# Patient Record
Sex: MALE | Race: WHITE | NOT HISPANIC OR LATINO | URBAN - METROPOLITAN AREA
[De-identification: names, ages, dates, MRNs, and addresses within clinical notes are randomized per-mention and may not be internally consistent; named-entity substitution may affect disease eponyms.]

---

## 2024-04-02 ENCOUNTER — INPATIENT (INPATIENT)
Facility: HOSPITAL | Age: 59
LOS: 13 days | Discharge: HOME HEALTH | DRG: 253 | End: 2024-04-16
Attending: STUDENT IN AN ORGANIZED HEALTH CARE EDUCATION/TRAINING PROGRAM | Admitting: STUDENT IN AN ORGANIZED HEALTH CARE EDUCATION/TRAINING PROGRAM
Payer: COMMERCIAL

## 2024-04-02 VITALS
OXYGEN SATURATION: 99 % | HEART RATE: 105 BPM | TEMPERATURE: 98 F | WEIGHT: 182.98 LBS | HEIGHT: 73 IN | DIASTOLIC BLOOD PRESSURE: 64 MMHG | RESPIRATION RATE: 18 BRPM | SYSTOLIC BLOOD PRESSURE: 120 MMHG

## 2024-04-02 DIAGNOSIS — Z02.9 ENCOUNTER FOR ADMINISTRATIVE EXAMINATIONS, UNSPECIFIED: ICD-10-CM

## 2024-04-02 DIAGNOSIS — E11.621 TYPE 2 DIABETES MELLITUS WITH FOOT ULCER: ICD-10-CM

## 2024-04-02 DIAGNOSIS — Z90.49 ACQUIRED ABSENCE OF OTHER SPECIFIED PARTS OF DIGESTIVE TRACT: Chronic | ICD-10-CM

## 2024-04-02 DIAGNOSIS — R94.31 ABNORMAL ELECTROCARDIOGRAM [ECG] [EKG]: ICD-10-CM

## 2024-04-02 DIAGNOSIS — F17.200 NICOTINE DEPENDENCE, UNSPECIFIED, UNCOMPLICATED: ICD-10-CM

## 2024-04-02 DIAGNOSIS — C18.9 MALIGNANT NEOPLASM OF COLON, UNSPECIFIED: ICD-10-CM

## 2024-04-02 DIAGNOSIS — Z29.9 ENCOUNTER FOR PROPHYLACTIC MEASURES, UNSPECIFIED: ICD-10-CM

## 2024-04-02 DIAGNOSIS — L08.9 LOCAL INFECTION OF THE SKIN AND SUBCUTANEOUS TISSUE, UNSPECIFIED: ICD-10-CM

## 2024-04-02 DIAGNOSIS — E11.9 TYPE 2 DIABETES MELLITUS WITHOUT COMPLICATIONS: ICD-10-CM

## 2024-04-02 DIAGNOSIS — Z93.9 ARTIFICIAL OPENING STATUS, UNSPECIFIED: Chronic | ICD-10-CM

## 2024-04-02 LAB
ALBUMIN SERPL ELPH-MCNC: 3.6 G/DL — SIGNIFICANT CHANGE UP (ref 3.3–5)
ALP SERPL-CCNC: 90 U/L — SIGNIFICANT CHANGE UP (ref 40–120)
ALT FLD-CCNC: 9 U/L — LOW (ref 10–45)
ANION GAP SERPL CALC-SCNC: 17 MMOL/L — SIGNIFICANT CHANGE UP (ref 5–17)
AST SERPL-CCNC: 9 U/L — LOW (ref 10–40)
BASE EXCESS BLDV CALC-SCNC: 4.5 MMOL/L — HIGH (ref -2–3)
BASOPHILS # BLD AUTO: 0.05 K/UL — SIGNIFICANT CHANGE UP (ref 0–0.2)
BASOPHILS NFR BLD AUTO: 0.4 % — SIGNIFICANT CHANGE UP (ref 0–2)
BILIRUB SERPL-MCNC: 0.7 MG/DL — SIGNIFICANT CHANGE UP (ref 0.2–1.2)
BUN SERPL-MCNC: 32 MG/DL — HIGH (ref 7–23)
CA-I SERPL-SCNC: 1.23 MMOL/L — SIGNIFICANT CHANGE UP (ref 1.15–1.33)
CALCIUM SERPL-MCNC: 9.7 MG/DL — SIGNIFICANT CHANGE UP (ref 8.4–10.5)
CHLORIDE BLDV-SCNC: 100 MMOL/L — SIGNIFICANT CHANGE UP (ref 96–108)
CHLORIDE SERPL-SCNC: 99 MMOL/L — SIGNIFICANT CHANGE UP (ref 96–108)
CO2 BLDV-SCNC: 32 MMOL/L — HIGH (ref 22–26)
CO2 SERPL-SCNC: 22 MMOL/L — SIGNIFICANT CHANGE UP (ref 22–31)
CREAT SERPL-MCNC: 1.18 MG/DL — SIGNIFICANT CHANGE UP (ref 0.5–1.3)
CRP SERPL-MCNC: 140 MG/L — HIGH (ref 0–4)
EGFR: 72 ML/MIN/1.73M2 — SIGNIFICANT CHANGE UP
EOSINOPHIL # BLD AUTO: 0.05 K/UL — SIGNIFICANT CHANGE UP (ref 0–0.5)
EOSINOPHIL NFR BLD AUTO: 0.4 % — SIGNIFICANT CHANGE UP (ref 0–6)
GAS PNL BLDV: 134 MMOL/L — LOW (ref 136–145)
GAS PNL BLDV: SIGNIFICANT CHANGE UP
GAS PNL BLDV: SIGNIFICANT CHANGE UP
GLUCOSE BLDC GLUCOMTR-MCNC: 161 MG/DL — HIGH (ref 70–99)
GLUCOSE BLDV-MCNC: 208 MG/DL — HIGH (ref 70–99)
GLUCOSE SERPL-MCNC: 199 MG/DL — HIGH (ref 70–99)
HCO3 BLDV-SCNC: 31 MMOL/L — HIGH (ref 22–29)
HCT VFR BLD CALC: 39 % — SIGNIFICANT CHANGE UP (ref 39–50)
HCT VFR BLDA CALC: 39 % — SIGNIFICANT CHANGE UP (ref 39–51)
HGB BLD CALC-MCNC: 12.9 G/DL — SIGNIFICANT CHANGE UP (ref 12.6–17.4)
HGB BLD-MCNC: 13 G/DL — SIGNIFICANT CHANGE UP (ref 13–17)
IMM GRANULOCYTES NFR BLD AUTO: 0.4 % — SIGNIFICANT CHANGE UP (ref 0–0.9)
LACTATE BLDV-MCNC: 1.5 MMOL/L — SIGNIFICANT CHANGE UP (ref 0.5–2)
LYMPHOCYTES # BLD AUTO: 1.47 K/UL — SIGNIFICANT CHANGE UP (ref 1–3.3)
LYMPHOCYTES # BLD AUTO: 10.4 % — LOW (ref 13–44)
MCHC RBC-ENTMCNC: 28.1 PG — SIGNIFICANT CHANGE UP (ref 27–34)
MCHC RBC-ENTMCNC: 33.3 GM/DL — SIGNIFICANT CHANGE UP (ref 32–36)
MCV RBC AUTO: 84.2 FL — SIGNIFICANT CHANGE UP (ref 80–100)
MONOCYTES # BLD AUTO: 0.74 K/UL — SIGNIFICANT CHANGE UP (ref 0–0.9)
MONOCYTES NFR BLD AUTO: 5.2 % — SIGNIFICANT CHANGE UP (ref 2–14)
NEUTROPHILS # BLD AUTO: 11.75 K/UL — HIGH (ref 1.8–7.4)
NEUTROPHILS NFR BLD AUTO: 83.2 % — HIGH (ref 43–77)
NRBC # BLD: 0 /100 WBCS — SIGNIFICANT CHANGE UP (ref 0–0)
PCO2 BLDV: 52 MMHG — SIGNIFICANT CHANGE UP (ref 42–55)
PH BLDV: 7.38 — SIGNIFICANT CHANGE UP (ref 7.32–7.43)
PLATELET # BLD AUTO: 394 K/UL — SIGNIFICANT CHANGE UP (ref 150–400)
PO2 BLDV: 17 MMHG — LOW (ref 25–45)
POTASSIUM BLDV-SCNC: 4.3 MMOL/L — SIGNIFICANT CHANGE UP (ref 3.5–5.1)
POTASSIUM SERPL-MCNC: 4.5 MMOL/L — SIGNIFICANT CHANGE UP (ref 3.5–5.3)
POTASSIUM SERPL-SCNC: 4.5 MMOL/L — SIGNIFICANT CHANGE UP (ref 3.5–5.3)
PROT SERPL-MCNC: 7.5 G/DL — SIGNIFICANT CHANGE UP (ref 6–8.3)
RBC # BLD: 4.63 M/UL — SIGNIFICANT CHANGE UP (ref 4.2–5.8)
RBC # FLD: 12.5 % — SIGNIFICANT CHANGE UP (ref 10.3–14.5)
SAO2 % BLDV: 23.1 % — LOW (ref 67–88)
SODIUM SERPL-SCNC: 138 MMOL/L — SIGNIFICANT CHANGE UP (ref 135–145)
WBC # BLD: 14.12 K/UL — HIGH (ref 3.8–10.5)
WBC # FLD AUTO: 14.12 K/UL — HIGH (ref 3.8–10.5)

## 2024-04-02 PROCEDURE — 71045 X-RAY EXAM CHEST 1 VIEW: CPT | Mod: 26

## 2024-04-02 PROCEDURE — 73720 MRI LWR EXTREMITY W/O&W/DYE: CPT | Mod: 26,LT

## 2024-04-02 PROCEDURE — 99285 EMERGENCY DEPT VISIT HI MDM: CPT

## 2024-04-02 PROCEDURE — 73630 X-RAY EXAM OF FOOT: CPT | Mod: 26,LT

## 2024-04-02 PROCEDURE — 99223 1ST HOSP IP/OBS HIGH 75: CPT

## 2024-04-02 RX ORDER — GLUCAGON INJECTION, SOLUTION 0.5 MG/.1ML
1 INJECTION, SOLUTION SUBCUTANEOUS ONCE
Refills: 0 | Status: DISCONTINUED | OUTPATIENT
Start: 2024-04-02 | End: 2024-04-10

## 2024-04-02 RX ORDER — ACETAMINOPHEN 500 MG
975 TABLET ORAL ONCE
Refills: 0 | Status: COMPLETED | OUTPATIENT
Start: 2024-04-02 | End: 2024-04-02

## 2024-04-02 RX ORDER — ALBUTEROL 90 UG/1
2 AEROSOL, METERED ORAL EVERY 6 HOURS
Refills: 0 | Status: DISCONTINUED | OUTPATIENT
Start: 2024-04-02 | End: 2024-04-10

## 2024-04-02 RX ORDER — DEXTROSE 50 % IN WATER 50 %
25 SYRINGE (ML) INTRAVENOUS ONCE
Refills: 0 | Status: DISCONTINUED | OUTPATIENT
Start: 2024-04-02 | End: 2024-04-10

## 2024-04-02 RX ORDER — VANCOMYCIN HCL 1 G
1000 VIAL (EA) INTRAVENOUS ONCE
Refills: 0 | Status: COMPLETED | OUTPATIENT
Start: 2024-04-02 | End: 2024-04-02

## 2024-04-02 RX ORDER — ACETAMINOPHEN 500 MG
650 TABLET ORAL EVERY 6 HOURS
Refills: 0 | Status: DISCONTINUED | OUTPATIENT
Start: 2024-04-02 | End: 2024-04-10

## 2024-04-02 RX ORDER — CEFEPIME 1 G/1
2000 INJECTION, POWDER, FOR SOLUTION INTRAMUSCULAR; INTRAVENOUS ONCE
Refills: 0 | Status: COMPLETED | OUTPATIENT
Start: 2024-04-02 | End: 2024-04-02

## 2024-04-02 RX ORDER — CEFEPIME 1 G/1
2000 INJECTION, POWDER, FOR SOLUTION INTRAMUSCULAR; INTRAVENOUS EVERY 8 HOURS
Refills: 0 | Status: DISCONTINUED | OUTPATIENT
Start: 2024-04-02 | End: 2024-04-05

## 2024-04-02 RX ORDER — VANCOMYCIN HCL 1 G
1000 VIAL (EA) INTRAVENOUS EVERY 12 HOURS
Refills: 0 | Status: DISCONTINUED | OUTPATIENT
Start: 2024-04-03 | End: 2024-04-04

## 2024-04-02 RX ORDER — INSULIN LISPRO 100/ML
VIAL (ML) SUBCUTANEOUS AT BEDTIME
Refills: 0 | Status: DISCONTINUED | OUTPATIENT
Start: 2024-04-02 | End: 2024-04-10

## 2024-04-02 RX ORDER — SODIUM CHLORIDE 9 MG/ML
1000 INJECTION, SOLUTION INTRAVENOUS
Refills: 0 | Status: DISCONTINUED | OUTPATIENT
Start: 2024-04-02 | End: 2024-04-10

## 2024-04-02 RX ORDER — DEXTROSE 50 % IN WATER 50 %
15 SYRINGE (ML) INTRAVENOUS ONCE
Refills: 0 | Status: DISCONTINUED | OUTPATIENT
Start: 2024-04-02 | End: 2024-04-10

## 2024-04-02 RX ORDER — ENOXAPARIN SODIUM 100 MG/ML
40 INJECTION SUBCUTANEOUS EVERY 24 HOURS
Refills: 0 | Status: DISCONTINUED | OUTPATIENT
Start: 2024-04-02 | End: 2024-04-10

## 2024-04-02 RX ORDER — DEXTROSE 50 % IN WATER 50 %
12.5 SYRINGE (ML) INTRAVENOUS ONCE
Refills: 0 | Status: DISCONTINUED | OUTPATIENT
Start: 2024-04-02 | End: 2024-04-10

## 2024-04-02 RX ORDER — INSULIN LISPRO 100/ML
VIAL (ML) SUBCUTANEOUS
Refills: 0 | Status: DISCONTINUED | OUTPATIENT
Start: 2024-04-02 | End: 2024-04-10

## 2024-04-02 RX ADMIN — Medication 250 MILLIGRAM(S): at 18:18

## 2024-04-02 RX ADMIN — CEFEPIME 100 MILLIGRAM(S): 1 INJECTION, POWDER, FOR SOLUTION INTRAMUSCULAR; INTRAVENOUS at 17:24

## 2024-04-02 RX ADMIN — Medication 975 MILLIGRAM(S): at 17:34

## 2024-04-02 NOTE — CONSULT NOTE ADULT - SUBJECTIVE AND OBJECTIVE BOX
Patient is a 59y old  Male who presents with a chief complaint of left foot submet 5 wound     HPI: 59-year-old male history of stage IV colon cancer peripheral neuropathy sent in from his podiatrist office with concern for osteomyelitis of the left foot.  Patient was unaware of any infection but noticed some drainage between his second and third toes this morning prompting him to go to the podiatrist.  There he was found to have what is described as a "bad" foot infection with concern for osteo and was sent in for admission and IV antibiotics.  Patient was rapidly assessed via a telemedicine and/or role of Quick Triage Doctor; a limited history, physical exam and assessment was performed. The patient will be seen and further evaluated in the main emergency department. The remainder of care and evaluation will be conducted by the primary emergency medicine team. Receiving team will follow up on labs, imaging and serially reassess patient as indicated. All further decisions regarding patient care, evaluation and disposition are at the discretion of the receiving primary emergency department team.      PAST MEDICAL & SURGICAL HISTORY:      MEDICATIONS  (STANDING):  acetaminophen     Tablet .. 975 milliGRAM(s) Oral once  vancomycin  IVPB. 1000 milliGRAM(s) IV Intermittent once    MEDICATIONS  (PRN):      Allergies    No Known Allergies    Intolerances        VITALS:    Vital Signs Last 24 Hrs  T(C): 36.9 (02 Apr 2024 14:09), Max: 36.9 (02 Apr 2024 14:09)  T(F): 98.4 (02 Apr 2024 14:09), Max: 98.4 (02 Apr 2024 14:09)  HR: 105 (02 Apr 2024 14:09) (105 - 105)  BP: 120/64 (02 Apr 2024 14:09) (120/64 - 120/64)  BP(mean): --  RR: 18 (02 Apr 2024 14:09) (18 - 18)  SpO2: 99% (02 Apr 2024 14:09) (99% - 99%)    Parameters below as of 02 Apr 2024 14:09  Patient On (Oxygen Delivery Method): room air        LABS:                CAPILLARY BLOOD GLUCOSE              LOWER EXTREMITY PHYSICAL EXAM:    Vascular: DP/PT 2/4, B/L, CFT <3  seconds B/L, Temperature gradient warm to cool B/L.   Neuro: Epicritic sensation diminished to the level of digits, B/L.  Musculoskeletal/Ortho: unremarkable   Skin: Left foot submet 5 wound to bone, fibronecrotic wound bed, tracking 1cm circumferentially malodorous, serous drainage. erythema to midfoot. Right foot with no open wounds or signs of infection.     RADIOLOGY & ADDITIONAL STUDIES:

## 2024-04-02 NOTE — H&P ADULT - NSHPSOCIALHISTORY_GEN_ALL_CORE
NO ethanol or recreational substance use.    Still smokes pack/day cigarettes since age 11 refuses to quit.   .   [employer].

## 2024-04-02 NOTE — H&P ADULT - NSHPSOURCEINFOTX_GEN_ALL_CORE
Patient reports no regular medications.    Family aware of admission and the patient did not wish for me to contact family at this hour.

## 2024-04-02 NOTE — ED PROVIDER NOTE - RAPID ASSESSMENT
59-year-old male history of stage IV colon cancer peripheral neuropathy sent in from his podiatrist office with concern for osteomyelitis of the left foot.  Patient was unaware of any infection but noticed some drainage between his second and third toes this morning prompting him to go to the podiatrist.  There he was found to have what is described as a "bad" foot infection with concern for osteo and was sent in for admission and IV antibiotics.  Patient was rapidly assessed via a telemedicine and/or role of Quick Triage Doctor; a limited history, physical exam and assessment was performed. The patient will be seen and further evaluated in the main emergency department. The remainder of care and evaluation will be conducted by the primary emergency medicine team. Receiving team will follow up on labs, imaging and serially reassess patient as indicated. All further decisions regarding patient care, evaluation and disposition are at the discretion of the receiving primary emergency department team.

## 2024-04-02 NOTE — H&P ADULT - PROBLEM SELECTOR PLAN 2
Patient S/P colectomy with ostomy.   Ostomy care per RN protocol.    Would consider formal Oncology evaluation in the AM.

## 2024-04-02 NOTE — H&P ADULT - EXTREMITIES COMMENTS
LEFT foot dressing c/d/i--changed by Podiatry and patient declined to have replaced by me.   RIGHT foot with shallow, healed plantar ulcer 5th MTP.

## 2024-04-02 NOTE — CONSULT NOTE ADULT - ASSESSMENT
59 y.o M w/ left foot submet 5 wound to bone   - Pt was seen and evaluated.   - Afebrile, labs pending   - Left foot submet 5 wound to bone, fibronecrotic wound bed, tracking 1cm circumferentially malodorous, serous drainage. erythema to midfoot. Right foot with no open wounds or signs of infection.   - Left foot xray: no gas, no OM  - Attention was drawn to the left foot, Using Sterile suture removal kit and #15 blade, the wound was explored and a 1cm incision was proximal to the wound down to the level of subQ and not beyond, mild serous draiange expressed  - The wound was flushed and packed, and dressed with dry sterile dressing   - Pt tolerated the procedure well  - Recommend admission through medicine.  - Recommend IV antibiotics IV vanco/cefepime  - Ordered ABIs/PVR - consult vascular if abnormal findings   - Left  foot wound cultured  - Ordered MRI of the left foot   - Pod Plan: Local wound care versus possible left foot 5th met head resection pending MR  - Please document medical clearance for podiatric surgery   - Discussed with attending.

## 2024-04-02 NOTE — ED ADULT NURSE NOTE - NSFALLRISKINTERV_ED_ALL_ED

## 2024-04-02 NOTE — H&P ADULT - PROBLEM SELECTOR PLAN 1
Patient agrees with IV antibiotics as above.   F/U MRI LEFT foot.   Would consider formal ID evaluation in the AM.

## 2024-04-02 NOTE — H&P ADULT - ASSESSMENT
NIGHT HOSPITALIST:     Referral to the ER following LEFT foot ulcer in the setting of apparent type 2 DM not currently on Rx with undifferentiated neuropathy (presumably from DM2 but unclear if from reported prior chemo, assuming myeloma previously excluded), with active tobacco use (refuses to quit or consider nicotine patches) with mild wheezing.   Patient agrees with IV antibiotics as above.   F/U MRI LEFT foot.    Patient S/P colectomy with ostomy.   Ostomy care per RN protocol.    Would consider formal Oncology evaluation in the AM.    Would consider formal ID evaluation in the AM.    Will follow FS S/S, UA, urine protein/Cr.    Would consider formal Endocrinology evaluation  in the AM.    Patient with septal Q waves on EKG.   Will obtain echo to assess LV function.    Patient agrees to pharmacologic DVT prophylaxis. NIGHT HOSPITALIST:     Referral to the ER following LEFT foot ulcer in the setting of apparent type 2 DM not currently on Rx with undifferentiated neuropathy (presumably from DM2 but unclear if from reported prior chemo, assuming myeloma previously excluded), with active tobacco use (refuses to quit or consider nicotine patches) with mild wheezing.   Patient agrees with IV antibiotics as above.   F/U MRI LEFT foot.   Would consider formal ID evaluation in the AM.    Patient S/P colectomy with ostomy.   Ostomy care per RN protocol.    Would consider formal Oncology evaluation in the AM.    Will follow FS S/S, UA, urine protein/Cr.    Would consider formal Endocrinology evaluation  in the AM.    Patient with septal Q waves on EKG.   Will obtain echo to assess LV function.    Patient agrees to pharmacologic DVT prophylaxis.

## 2024-04-02 NOTE — H&P ADULT - PROBLEM SELECTOR PROBLEM 2
General: elderly woman reclining in bed with flat affect, staring straight ahead, interacts with interviewer but takes significant amount of time to answer questions  -no bruising or broken bones noted  HEENT: skin tightening in circular pattern around mouth, speaks with minimal movement of mouth, does not open eyes completely wide  Cardiac: S1S2, RRR, no carotid bruits appreciated  Lungs: CTAB  Abd: NTND, old surgical scar on abd, +soft, +BS  LE: no edema  Neuro: AAOx2, no focal deficits, CN 2-12 grossly intact
Colon cancer

## 2024-04-02 NOTE — ED ADULT NURSE NOTE - OBJECTIVE STATEMENT
patient is a 60 y/o M with hx of stage IV colon CA s/p chemo c/b peripheral neuropathy who presents to the ED via triage for wound check. patient states that after his chemo round he developed severe neuropathy and hasn't been able to feel his feet. patient states that this morning he looked down at his left foot and noticed the wound on the bottom of the foot. patient sent in by podiatrist to r/o osteomyelitis. patient endorsing difficulty with ambulation. patient resting in NAD. respirations even and unlabored. abdomen soft, nondistended. denies fevers/chills, weakness, headache, dizziness, vision changes, cp, sob, cough, abd pain, n/v/d, dysuria, hematuria, bloody stools, back pain. 20 G IV placed in LAC. podiatry at bedside patient is a 58 y/o M with hx of stage IV colon CA s/p chemo c/b peripheral neuropathy who presents to the ED via triage for wound check. patient states that after his chemo round he developed severe neuropathy and hasn't been able to feel his feet. patient states that this morning he looked down at his left foot and noticed the wound on the bottom of the foot. patient sent in by podiatrist to r/o osteomyelitis. patient endorsing difficulty with ambulation. patient resting in NAD. respirations even and unlabored. abdomen soft, nondistended. denies fevers/chills, weakness, headache, dizziness, vision changes, cp, sob, cough, abd pain, n/v/d, dysuria, hematuria, bloody stools, back pain. 20 G IV placed in LAC. podiatry at bedside. patient with unaccessed right anterior chest wall port and ostomy bag

## 2024-04-02 NOTE — H&P ADULT - HISTORY OF PRESENT ILLNESS
NIGHT HOSPITALIST:     Patient UNKNOWN to me previously, assigned to me at this point via the ER and by Dr. 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   Patient admits that he does not normally inspect his feet (patient with a healed RIGHT plantar 5th MTP ulcer) until today.  Seen by Podiatry in the ER with LEFT foot submet 5 wound with dressing changed by Podiatry.  NO fever, no chills, no rigors.  NO N/V.    NO chest pain/pressure.  NO palpitations.  NO cough, no dyspnoea.  NO abdominal pain, no ostomy pain.  NO red blood per rectum or ostomy.  No back pain, no tearing back pain.  NO dysuria.

## 2024-04-02 NOTE — H&P ADULT - PROBLEM SELECTOR PLAN 3
Will follow FS S/S, UA, urine protein/Cr.    Would consider formal Endocrinology evaluation  in the AM.

## 2024-04-02 NOTE — ED PROVIDER NOTE - PHYSICAL EXAMINATION
CONSTITUTIONAL: Well appearing and in no apparent distress.  ENT: Airway patent, moist mucous membranes.   EYES: Pupils equal, round and reactive to light. EOMI. Conjunctiva normal appearing.   CARDIAC: Normal rate, regular rhythm.  Heart sounds S1, S2.    RESPIRATORY: Breath sounds clear and equal bilaterally.   GASTROINTESTINAL: Abdomen soft, non-tender, not distended.  MUSCULOSKELETAL: Spine appears normal.  NEUROLOGICAL: Alert and oriented x3, grossly normal. CONSTITUTIONAL: Well appearing and in no apparent distress.  ENT: Airway patent, moist mucous membranes.   EYES: Pupils equal, round and reactive to light. EOMI. Conjunctiva normal appearing.   CARDIAC: Normal rate, regular rhythm.  Heart sounds S1, S2.    RESPIRATORY: Breath sounds clear and equal bilaterally.   GASTROINTESTINAL: Abdomen soft, non-tender, not distended.  MUSCULOSKELETAL: Spine appears normal. Left foot wound to plantar aspect of foot on lateral aspect. +Malodorous w/ serous drainage.   NEUROLOGICAL: Alert and oriented x3, grossly normal.

## 2024-04-02 NOTE — H&P ADULT - PROBLEM SELECTOR PLAN 6
Transitions of Care Status:  1.  Name of PCP:    Dewayne Purvis MD (Oncology) [phone]  2.  PCP Contacted on Admission: [ ] Y    [x ] N    3.  PCP contacted at Discharge: [ ] Y    [ ] N    [ ] N/A  4.  Post-Discharge Appointment Date and Location:  5.  Summary of Handoff given to PCP:

## 2024-04-02 NOTE — H&P ADULT - PROBLEM SELECTOR PLAN 7
Active tobacco use (refuses to quit or consider nicotine patches) with mild wheezing.   Proventil MDI PRN.

## 2024-04-02 NOTE — H&P ADULT - NSHPLABSRESULTS_GEN_ALL_CORE
WBC 14.1    Hgb 13.0    Platelets of 294K    Random glucose of 199  HCO3 22    K+ 4.5    Cr 1.1    Alb 3.6    Chest radiograph interpreted by me with RIGHT sided Mediport, no infiltrate or effusion.    EKG tracing interpreted by me with NSR at 98 with incomplete RBBB with Q V1V2.    LEFT foot radiograph with no gas, no FB, or gross radiographic evidence of osteo, no fx or dislocations.  No lytic or blastic lesions.

## 2024-04-02 NOTE — ED PROVIDER NOTE - CLINICAL SUMMARY MEDICAL DECISION MAKING FREE TEXT BOX
60 yo M with a PMH of stage IV colon cancer, peripheral neuropathy presents as referral from Pods with concern for osteomyelitis of the left foot. Patient noticed some drainage between his second and third toes this morning prompting him to go to the podiatrist and was then referred to ED. 58 yo M with a PMH of stage IV colon cancer, peripheral neuropathy presents as referral from Pods with concern for osteomyelitis of the left foot. Initially went to see his Oncologist, Dr. Rock today for follow up, showed him his right foot and noticed wound and drainage. Reports he noticed some drainage between his second and third toes this morning. Oncologist prompted him to go to the Podiatrist who then referred to ED. Pt has been able to ambulate and denies any foot pain. Denies nausea, vomiting, fever, chills, extremity weakness/paresthesias. Plan for labs- cbc/cmp/blood cs, xray left foot, pods cs and admit for IV abx. Angela Latif PA-C 60 yo M with a PMH of stage IV colon cancer, peripheral neuropathy presents as referral from Pods with concern for osteomyelitis of the left foot. Initially went to see his Oncologist, Dr. Rock today for follow up, showed him his right foot and noticed wound and drainage. Reports he noticed some drainage between his second and third toes this morning. Oncologist prompted him to go to the Podiatrist who then referred to ED. Pt has been able to ambulate and denies any foot pain. Denies nausea, vomiting, fever, chills, extremity weakness/paresthesias. Plan for labs- cbc/cmp/blood cs, xray left foot, pods cs and admit for IV abx. GIANNI Lerner: 59 yea rold male with stage IV colon cancer referred to ER for eval for osteo of left foot.  PE: alert, nad, nonlabored presisrionts, + s1s2: msk: left foot wound to plantar aspect of foot on lateral aspect, + malodrous with serous drainage, warm, + + 2 dp b/l le. alert and oriented x 3, no focal deficits. will get imging, labs, podiatry consult, start kerri bx. admit for iv abx and eval for osteo.

## 2024-04-02 NOTE — ED ADULT TRIAGE NOTE - CHIEF COMPLAINT QUOTE
L side foot wound, sent by podiatrist to be admitted for IV antibiotics for necrotic L foot infection.

## 2024-04-02 NOTE — H&P ADULT - NSHPADDITIONALINFOADULT_GEN_ALL_CORE
NIGHT HOSPITALIST:     Patient aware of course and agrees with plan/care as above.   Given patient's comorbidities, patient's long term prognosis is guarded.   Emotional support provided to patient.    The patient is not yet ready to discuss advance directives.  Discussed with Dr. Berry of Podiatry.   Care reviewed with covering NP/PA for endorsement to the [facility].    Paxton Obando MD  Available On [employer] Teams.

## 2024-04-02 NOTE — H&P ADULT - RESPIRATORY COMMENTS
Good air exchange, occasional wheeze. Good air exchange, occasional wheeze.   RIGHT [facility] c/d/i.

## 2024-04-02 NOTE — H&P ADULT - NSHPREVIEWOFSYSTEMS_GEN_ALL_CORE
NO HA< no focal weakness.  No chest pain/pressure.  NO palpitations.  NO cough, no dyspnoea.  No fever, no chills, no rigors.  NO back pain, no tearing back pain.    NO ostomy pain, no red blood per rectum or melena per ostomy.  NO rash.  NO SI/HI>  NO thyroid symptoms.  NO dysuria, no hematuria.    Patient has refused the COVID-19 vaccine to date.

## 2024-04-02 NOTE — H&P ADULT - PROBLEM SELECTOR PLAN 4
Patient with septal Q waves on EKG.   Will obtain echo to assess LV function. Patient with septal Q waves on EKG.   No present symptoms of ACS.  Will obtain echo to assess LV function.

## 2024-04-03 LAB
A1C WITH ESTIMATED AVERAGE GLUCOSE RESULT: 6.8 % — HIGH (ref 4–5.6)
ANION GAP SERPL CALC-SCNC: 15 MMOL/L — SIGNIFICANT CHANGE UP (ref 5–17)
APPEARANCE UR: CLEAR — SIGNIFICANT CHANGE UP
BACTERIA # UR AUTO: NEGATIVE /HPF — SIGNIFICANT CHANGE UP
BASOPHILS # BLD AUTO: 0.05 K/UL — SIGNIFICANT CHANGE UP (ref 0–0.2)
BASOPHILS NFR BLD AUTO: 0.4 % — SIGNIFICANT CHANGE UP (ref 0–2)
BILIRUB UR-MCNC: NEGATIVE — SIGNIFICANT CHANGE UP
BUN SERPL-MCNC: 29 MG/DL — HIGH (ref 7–23)
CALCIUM SERPL-MCNC: 9 MG/DL — SIGNIFICANT CHANGE UP (ref 8.4–10.5)
CAST: 8 /LPF — HIGH (ref 0–4)
CHLORIDE SERPL-SCNC: 99 MMOL/L — SIGNIFICANT CHANGE UP (ref 96–108)
CO2 SERPL-SCNC: 21 MMOL/L — LOW (ref 22–31)
COLOR SPEC: SIGNIFICANT CHANGE UP
CREAT ?TM UR-MCNC: 200 MG/DL — SIGNIFICANT CHANGE UP
CREAT SERPL-MCNC: 1.12 MG/DL — SIGNIFICANT CHANGE UP (ref 0.5–1.3)
DIFF PNL FLD: ABNORMAL
EGFR: 76 ML/MIN/1.73M2 — SIGNIFICANT CHANGE UP
EOSINOPHIL # BLD AUTO: 0.15 K/UL — SIGNIFICANT CHANGE UP (ref 0–0.5)
EOSINOPHIL NFR BLD AUTO: 1.2 % — SIGNIFICANT CHANGE UP (ref 0–6)
ERYTHROCYTE [SEDIMENTATION RATE] IN BLOOD: 99 MM/HR — HIGH (ref 0–20)
ESTIMATED AVERAGE GLUCOSE: 148 MG/DL — HIGH (ref 68–114)
GLUCOSE BLDC GLUCOMTR-MCNC: 120 MG/DL — HIGH (ref 70–99)
GLUCOSE BLDC GLUCOMTR-MCNC: 190 MG/DL — HIGH (ref 70–99)
GLUCOSE BLDC GLUCOMTR-MCNC: 199 MG/DL — HIGH (ref 70–99)
GLUCOSE BLDC GLUCOMTR-MCNC: 243 MG/DL — HIGH (ref 70–99)
GLUCOSE SERPL-MCNC: 242 MG/DL — HIGH (ref 70–99)
GLUCOSE UR QL: NEGATIVE MG/DL — SIGNIFICANT CHANGE UP
GRAM STN FLD: ABNORMAL
HCT VFR BLD CALC: 34.7 % — LOW (ref 39–50)
HGB BLD-MCNC: 11.6 G/DL — LOW (ref 13–17)
IMM GRANULOCYTES NFR BLD AUTO: 0.3 % — SIGNIFICANT CHANGE UP (ref 0–0.9)
KETONES UR-MCNC: NEGATIVE MG/DL — SIGNIFICANT CHANGE UP
LEUKOCYTE ESTERASE UR-ACNC: NEGATIVE — SIGNIFICANT CHANGE UP
LYMPHOCYTES # BLD AUTO: 1.6 K/UL — SIGNIFICANT CHANGE UP (ref 1–3.3)
LYMPHOCYTES # BLD AUTO: 13.2 % — SIGNIFICANT CHANGE UP (ref 13–44)
MCHC RBC-ENTMCNC: 28 PG — SIGNIFICANT CHANGE UP (ref 27–34)
MCHC RBC-ENTMCNC: 33.4 GM/DL — SIGNIFICANT CHANGE UP (ref 32–36)
MCV RBC AUTO: 83.6 FL — SIGNIFICANT CHANGE UP (ref 80–100)
MONOCYTES # BLD AUTO: 0.88 K/UL — SIGNIFICANT CHANGE UP (ref 0–0.9)
MONOCYTES NFR BLD AUTO: 7.2 % — SIGNIFICANT CHANGE UP (ref 2–14)
MRSA PCR RESULT.: SIGNIFICANT CHANGE UP
NEUTROPHILS # BLD AUTO: 9.42 K/UL — HIGH (ref 1.8–7.4)
NEUTROPHILS NFR BLD AUTO: 77.7 % — HIGH (ref 43–77)
NITRITE UR-MCNC: NEGATIVE — SIGNIFICANT CHANGE UP
NRBC # BLD: 0 /100 WBCS — SIGNIFICANT CHANGE UP (ref 0–0)
PH UR: 5.5 — SIGNIFICANT CHANGE UP (ref 5–8)
PLATELET # BLD AUTO: 339 K/UL — SIGNIFICANT CHANGE UP (ref 150–400)
POTASSIUM SERPL-MCNC: 4.1 MMOL/L — SIGNIFICANT CHANGE UP (ref 3.5–5.3)
POTASSIUM SERPL-SCNC: 4.1 MMOL/L — SIGNIFICANT CHANGE UP (ref 3.5–5.3)
PROT ?TM UR-MCNC: 299 MG/DL — HIGH (ref 0–12)
PROT UR-MCNC: 300 MG/DL
PROT/CREAT UR-RTO: 1.5 RATIO — HIGH (ref 0–0.2)
RBC # BLD: 4.15 M/UL — LOW (ref 4.2–5.8)
RBC # FLD: 12.3 % — SIGNIFICANT CHANGE UP (ref 10.3–14.5)
RBC CASTS # UR COMP ASSIST: 3 /HPF — SIGNIFICANT CHANGE UP (ref 0–4)
REVIEW: SIGNIFICANT CHANGE UP
S AUREUS DNA NOSE QL NAA+PROBE: SIGNIFICANT CHANGE UP
SODIUM SERPL-SCNC: 135 MMOL/L — SIGNIFICANT CHANGE UP (ref 135–145)
SP GR SPEC: 1.03 — SIGNIFICANT CHANGE UP (ref 1–1.03)
SPECIMEN SOURCE: SIGNIFICANT CHANGE UP
SQUAMOUS # UR AUTO: 2 /HPF — SIGNIFICANT CHANGE UP (ref 0–5)
UROBILINOGEN FLD QL: 0.2 MG/DL — SIGNIFICANT CHANGE UP (ref 0.2–1)
WBC # BLD: 12.14 K/UL — HIGH (ref 3.8–10.5)
WBC # FLD AUTO: 12.14 K/UL — HIGH (ref 3.8–10.5)
WBC UR QL: 1 /HPF — SIGNIFICANT CHANGE UP (ref 0–5)

## 2024-04-03 PROCEDURE — 71045 X-RAY EXAM CHEST 1 VIEW: CPT | Mod: 26

## 2024-04-03 PROCEDURE — 93010 ELECTROCARDIOGRAM REPORT: CPT | Mod: 76

## 2024-04-03 PROCEDURE — 93356 MYOCRD STRAIN IMG SPCKL TRCK: CPT

## 2024-04-03 PROCEDURE — 93306 TTE W/DOPPLER COMPLETE: CPT | Mod: 26

## 2024-04-03 PROCEDURE — 93923 UPR/LXTR ART STDY 3+ LVLS: CPT | Mod: 26

## 2024-04-03 RX ORDER — INFLUENZA VIRUS VACCINE 15; 15; 15; 15 UG/.5ML; UG/.5ML; UG/.5ML; UG/.5ML
0.5 SUSPENSION INTRAMUSCULAR ONCE
Refills: 0 | Status: DISCONTINUED | OUTPATIENT
Start: 2024-04-03 | End: 2024-04-16

## 2024-04-03 RX ORDER — CHLORHEXIDINE GLUCONATE 213 G/1000ML
1 SOLUTION TOPICAL
Refills: 0 | Status: DISCONTINUED | OUTPATIENT
Start: 2024-04-03 | End: 2024-04-10

## 2024-04-03 RX ADMIN — CEFEPIME 100 MILLIGRAM(S): 1 INJECTION, POWDER, FOR SOLUTION INTRAMUSCULAR; INTRAVENOUS at 05:11

## 2024-04-03 RX ADMIN — CHLORHEXIDINE GLUCONATE 1 APPLICATION(S): 213 SOLUTION TOPICAL at 13:52

## 2024-04-03 RX ADMIN — CEFEPIME 100 MILLIGRAM(S): 1 INJECTION, POWDER, FOR SOLUTION INTRAMUSCULAR; INTRAVENOUS at 13:52

## 2024-04-03 RX ADMIN — ENOXAPARIN SODIUM 40 MILLIGRAM(S): 100 INJECTION SUBCUTANEOUS at 22:08

## 2024-04-03 RX ADMIN — Medication 1: at 18:09

## 2024-04-03 RX ADMIN — CEFEPIME 100 MILLIGRAM(S): 1 INJECTION, POWDER, FOR SOLUTION INTRAMUSCULAR; INTRAVENOUS at 22:09

## 2024-04-03 RX ADMIN — Medication 2: at 08:16

## 2024-04-03 RX ADMIN — Medication 250 MILLIGRAM(S): at 18:09

## 2024-04-03 RX ADMIN — Medication 1: at 13:52

## 2024-04-03 RX ADMIN — Medication 250 MILLIGRAM(S): at 07:12

## 2024-04-03 NOTE — PROGRESS NOTE ADULT - SUBJECTIVE AND OBJECTIVE BOX
SUBJECTIVE / OVERNIGHT EVENTS:    Patient seen and examined at bedside. Upset about his foot      --------------------------------------------------------------------------------------------  LABS:                        11.6   12.14 )-----------( 339      ( 2024 07:17 )             34.7     04    135  |  99  |  29<H>  ----------------------------<  242<H>  4.1   |  21<L>  |  1.12    Ca    9.0      2024 07:17    TPro  7.5  /  Alb  3.6  /  TBili  0.7  /  DBili  x   /  AST  9<L>  /  ALT  9<L>  /  AlkPhos  90  04-02      CAPILLARY BLOOD GLUCOSE      POCT Blood Glucose.: 243 mg/dL (2024 07:28)  POCT Blood Glucose.: 161 mg/dL (2024 23:24)        Urinalysis Basic - ( 2024 07:27 )    Color: Dark Yellow / Appearance: Clear / S.027 / pH: x  Gluc: x / Ketone: Negative mg/dL  / Bili: Negative / Urobili: 0.2 mg/dL   Blood: x / Protein: 300 mg/dL / Nitrite: Negative   Leuk Esterase: Negative / RBC: 3 /HPF / WBC 1 /HPF   Sq Epi: x / Non Sq Epi: 2 /HPF / Bacteria: Negative /HPF        RADIOLOGY & ADDITIONAL TESTS: < from: MR Foot w/wo IV Cont, Left (24 @ 23:00) >    IMPRESSION:  Findings suggestive of early osteomyelitis at the fifth metatarsal head   and base of the proximal phalanx, deep to the soft tissue wound. Focal   osteitis is also a differential consideration for the osseous findings..    --- End of Report ---    < end of copied text >  < from: Xray Chest 1 View AP/PA (24 @ 14:52) >      IMPRESSION:    No radiographic evidence of acute cardiopulmonary disease. Right-sided   chest wall port with tip in proper position.    --- End of Report ---      < end of copied text >  < from: Xray Foot AP + Lateral + Oblique, Left (24 @ 14:51) >  IMPRESSION:  No tracking soft tissue gas collections, radiopaque foreign bodies, or   gross radiographic evidence for osteomyelitis.    No fractures or dislocations.    Tarsometatarsal alignment maintained without evidence for a Lisfranc   injury.    Congenitally fused 5th DIP joint. Preserved remaining visualized joint   spaces and no joint margin erosions.    No lytic or blastic lesions.    --- End of Report ---    < end of copied text >      Imaging Personally Reviewed:  [x] YES  [ ] NO    Consultant(s) Notes Reviewed:  [x] YES  [ ] NO    MEDICATIONS  (STANDING):  cefepime   IVPB 2000 milliGRAM(s) IV Intermittent every 8 hours  dextrose 5%. 1000 milliLiter(s) (50 mL/Hr) IV Continuous <Continuous>  dextrose 5%. 1000 milliLiter(s) (100 mL/Hr) IV Continuous <Continuous>  dextrose 50% Injectable 25 Gram(s) IV Push once  dextrose 50% Injectable 12.5 Gram(s) IV Push once  dextrose 50% Injectable 25 Gram(s) IV Push once  enoxaparin Injectable 40 milliGRAM(s) SubCutaneous every 24 hours  glucagon  Injectable 1 milliGRAM(s) IntraMuscular once  insulin lispro (ADMELOG) corrective regimen sliding scale   SubCutaneous three times a day before meals  insulin lispro (ADMELOG) corrective regimen sliding scale   SubCutaneous at bedtime  vancomycin  IVPB 1000 milliGRAM(s) IV Intermittent every 12 hours    MEDICATIONS  (PRN):  acetaminophen     Tablet .. 650 milliGRAM(s) Oral every 6 hours PRN Temp greater or equal to 38C (100.4F), Mild Pain (1 - 3)  albuterol    90 MICROgram(s) HFA Inhaler 2 Puff(s) Inhalation every 6 hours PRN Bronchospasm  dextrose Oral Gel 15 Gram(s) Oral once PRN Blood Glucose LESS THAN 70 milliGRAM(s)/deciliter      Care Discussed with Consultants/Other Providers [x] YES  [ ] NO    Vital Signs Last 24 Hrs  T(C): 37.2 (2024 09:07), Max: 37.6 (2024 18:23)  T(F): 99 (2024 09:07), Max: 99.6 (2024 18:23)  HR: 92 (2024 09:07) (77 - 105)  BP: 147/75 (2024 09:07) (113/72 - 147/75)  BP(mean): --  RR: 18 (2024 09:07) (15 - 18)  SpO2: 98% (2024 09:07) (96% - 99%)    Parameters below as of 2024 09:07  Patient On (Oxygen Delivery Method): room air      I&O's Summary        PHYSICAL EXAM:  GENERAL: NAD, well-developed, comfortable  HEAD:  Atraumatic, Normocephalic  EYES: EOMI, PERRLA, conjunctiva and sclera clear  NECK: Supple, No JVD  CHEST/LUNG: Clear to auscultation bilaterally; No wheeze  HEART: Regular rate and rhythm; No murmurs, rubs, or gallops  ABDOMEN: Soft, Nontender, Nondistended; Bowel sounds present  NEURO: AAOx3, no focal weakness, 5/5 b/l extremity strength, b/l knee no arthritis, no effusion   EXTREMITIES:  2+ Peripheral Pulses, No clubbing, cyanosis, or edema  SKIN: Left foot dressing c/d/i

## 2024-04-03 NOTE — CONSULT NOTE ADULT - ASSESSMENT
Mr. Menon is a 59 year old male with PMHx of  peripheral neuropathy, Type II DM,  anemia,  status post colectomy with colostomy 01/2024  due to rectal cancer under the care of Dr. Renny Purvis now admitted with osteomyelitis of the left foot.  MRI foot shows Findings suggestive of early osteomyelitis at the fifth metatarsal head and base of the proximal phalanx, deep to the soft tissue wound. Focal osteitis is also a differential consideration for the osseous findings. Podiatry pending resection with graft. Infectious disease consulted.    Labs show hemoglobin 11.6, hematocrit  34.7, MCV 83.6 platelet count 339, WBC 12.14 with neutrophilic predominance. Creatinine 1.12 with normal liver function.  HbA1c 6.8.  TTE with EF 69%.     Patient last seen in Hematology Oncology Clinic 04/02/2024  and oxaliplatin was discontinued due to peripheral neuropathy.      # Stage IV Rectal Adenocarcinoma   - Diagnosed 08/2023  - Invasive, moderately differentiated colonic adenocarcinoma  - Low probability of MSI-high  - HER-2-Mary Negative  - KRAS is Wild Type  - Metastatic to liver  - FOLFOX and weekly Cetuximab started 9/18/23  - PET-CT from 03/20/2024 shows that the patient is status post surgery with no abnormal FDG uptake in the pelvis postsurgical area.  More prominent uptake in the gallbladder fossa (early recurrence versus normal fluctuation of uptake ) and no other FDG avid lesion in the field-of-view.  Previously noted liver Mets are not presently seen.  - Was scheduled to receive 5FU, Leucovorin and Cetuximab 04/02/2024, therapy held due to concern for L foot infection and pt sent to Podiatry     # Left Foot Osteomyelitis  - Pt pending section with Podiatry  - Antibiotics per ID and primary team     # Neutrophilia   - Due to infection  - Outpatient labs 04/02/2024 WBC 16.19 ANC 14,170  - Continue to monitor     # Normocytic anemia  - Likely multifactorial, active infection, cancer, chemotherapy, dilutional   - Outpatient labs 04/02/2024 Hb 13.7, MCV 83.5   - Continue to monitor  - Transfuse to maintain Hb > 7     Thank you for allowing me to participate in the care of Mr. Menon, please do not hesitate to call or text me if you have further questions or concerns.     Gato Horvath MD  [facility]   Division of Hematology/Oncology  [address], [address]  [place] [address]  P: [phone]  F: [phone]    Attestation:   Total time spent on the encounter: >** minutes   ----Including ** min face-to-face interaction in addition to chart review, reviewing treatment plan, and managing the patient’s chronic diagnoses as listed in the assessment----     1.	I have reviewed, analyzed and interpreted the following labs: CBC, CMP, imaging:  MRI Foot and TTE impressions as above.   2.	I have reviewed notes stating pts current admission, consultants and follow ups.   3.	I have reviewed the past medical, family, and surgical history and confirmed with outpatient records    Mr. Menon is a 59 year old male active smoker with PMHx of  peripheral neuropathy, Type II DM,  anemia,  status post colectomy with colostomy 01/2024  due to rectal cancer under the care of Dr. Renny Purvis now admitted with osteomyelitis of the left foot.  MRI foot shows Findings suggestive of early osteomyelitis at the fifth metatarsal head and base of the proximal phalanx, deep to the soft tissue wound. Focal osteitis is also a differential consideration for the osseous findings. Podiatry pending resection with graft. Infectious disease consulted.    Labs show hemoglobin 11.6, hematocrit  34.7, MCV 83.6 platelet count 339, WBC 12.14 with neutrophilic predominance. Creatinine 1.12 with normal liver function.  HbA1c 6.8.  TTE with EF 69%.     Patient last seen in Hematology Oncology Clinic 04/02/2024  and oxaliplatin was discontinued due to peripheral neuropathy.      # Stage IV Rectal Adenocarcinoma   - Diagnosed 08/2023  - Invasive, moderately differentiated colonic adenocarcinoma  - Low probability of MSI-high  - HER-2-Mary Negative  - KRAS is Wild Type  - Metastatic to liver  - FOLFOX and weekly Cetuximab started 9/18/23  - PET-CT from 03/20/2024 shows that the patient is status post surgery with no abnormal FDG uptake in the pelvis postsurgical area.  More prominent uptake in the gallbladder fossa (early recurrence versus normal fluctuation of uptake ) and no other FDG avid lesion in the field-of-view.  Previously noted liver Mets are not presently seen.  - Was scheduled to receive 5FU, Leucovorin and Cetuximab 04/02/2024, therapy held due to concern for L foot infection and pt sent to Podiatry     # Left Foot Osteomyelitis  - Pt pending section with Podiatry  - Antibiotics per ID and primary team     # Neutrophilia   - Due to infection  - Outpatient labs 04/02/2024 WBC 16.19 ANC 14,170  - Continue to monitor     # Normocytic anemia  - Likely multifactorial, active infection, cancer, chemotherapy, dilutional   - Outpatient labs 04/02/2024 Hb 13.7, MCV 83.5   - Continue to monitor  - Transfuse to maintain Hb > 7     # Nicotine dependence, active cigarette smoker  - Counseling provided, pt aware of possible compromised wound healing       Thank you for allowing me to participate in the care of Mr. Menon, please do not hesitate to call or text me if you have further questions or concerns.     Gato Horvath MD  [facility]   Division of Hematology/Oncology  [address], [address]  [place] [address]  P: [phone]  F: [phone]    Attestation:   Total time spent on the encounter: >60 minutes   ----Including face-to-face interaction in addition to chart review, reviewing treatment plan, and managing the patient’s chronic diagnoses as listed in the assessment----     1.	I have reviewed, analyzed and interpreted the following labs: CBC, CMP, imaging:  MRI Foot and TTE impressions as above.   2.	I have reviewed notes stating pts current admission, consultants and follow ups.   3.	I have reviewed the past medical, family, and surgical history and confirmed with outpatient records

## 2024-04-03 NOTE — CONSULT NOTE ADULT - SUBJECTIVE AND OBJECTIVE BOX
Mr. Menon is a 59 year old male with PMHx of  peripheral neuropathy, Type II DM,  anemia,  status post colectomy with colostomy 01/2024  due to rectal cancer under the care of Dr. Renny Purvis now admitted with osteomyelitis of the left foot.  MRI foot shows Findings suggestive of early osteomyelitis at the fifth metatarsal head and base of the proximal phalanx, deep to the soft tissue wound. Focal osteitis is also a differential consideration for the osseous findings. Podiatry pending resection with graft. Infectious disease consulted.    Labs show hemoglobin 11.6, hematocrit  34.7, MCV 83.6 platelet count 339, WBC 12.14 with neutrophilic predominance. Creatinine 1.12 with normal liver function.  HbA1c 6.8.  TTE with EF 69%.     Patient last seen in Hematology Oncology Clinic 04/02/2024  and oxaliplatin was discontinued due to peripheral neuropathy.      # Stage IV Rectal Adenocarcinoma   - Diagnosed 08/2023  - Invasive, moderately differentiated colonic adenocarcinoma  - Low probability of MSI-high  - HER-2-Mary Negative  - KRAS is Wild Type  - Metastatic to liver  - FOLFOX and weekly Cetuximab started 9/18/23  - PET-CT from 03/20/2024 shows that the patient is status post surgery with no abnormal FDG uptake in the pelvis postsurgical area.  More prominent uptake in the gallbladder fossa (early recurrence versus normal fluctuation of uptake ) and no other FDG avid lesion in the field-of-view.  Previously noted liver Mets are not presently seen.  - Was scheduled to receive 5FU, Leucovorin and Cetuximab 04/02/2024, therapy held due to concern for L foot infection and pt sent to Podiatry     # Left Foot Osteomyelitis  - Pt pending section with Podiatry  - Antibiotics per ID and primary team     # Neutrophilia   - Due to infection  - Outpatient labs 04/02/2024 WBC 16.19 ANC 14,170  - Continue to monitor     # Normocytic anemia  - Likely multifactorial, active infection, cancer, chemotherapy, dilutional   - Outpatient labs 04/02/2024 Hb 13.7, MCV 83.5   - Continue to monitor  - Transfuse to maintain Hb > 7     Thank you for allowing me to participate in the care of Mr. Menon, please do not hesitate to call or text me if you have further questions or concerns.     Gato Horvath MD  [facility]   Division of Hematology/Oncology  [address], [address]  [place] [address]  P: [phone]  F: [phone]    Attestation:   Total time spent on the encounter: >** minutes   ----Including ** min face-to-face interaction in addition to chart review, reviewing treatment plan, and managing the patient’s chronic diagnoses as listed in the assessment----     1.	I have reviewed, analyzed and interpreted the following labs: CBC, CMP, imaging:  MRI Foot and TTE impressions as above.   2.	I have reviewed notes stating pts current admission, consultants and follow ups.   3.	I have reviewed the past medical, family, and surgical history and confirmed with outpatient records  [facility] H[facility]ATOLOG[facility] CONSULT NOTE     HPI:  Mr. Menon is a 59y Male with PMHx of     ROS: pertinent positives and negatives as per HPI    Allergies: No Known Allergies    PMHx:  Colon cancer  COVID-19 vaccination refused  Tobacco dependence    SurgHx:   S/P colectomy  History of creation of ostomy    FHx:   No pertinent family history in first degree relatives    SocHx:     Meds:   MEDICATIONS  (STANDING):  cefepime   IVPB 2000 milliGRAM(s) IV Intermittent every 8 hours  chlorhexidine 2% Cloths 1 Application(s) Topical <User Schedule>  dextrose 5%. 1000 milliLiter(s) (50 mL/Hr) IV Continuous <Continuous>  dextrose 5%. 1000 milliLiter(s) (100 mL/Hr) IV Continuous <Continuous>  dextrose 50% Injectable 25 Gram(s) IV Push once  dextrose 50% Injectable 12.5 Gram(s) IV Push once  dextrose 50% Injectable 25 Gram(s) IV Push once  enoxaparin Injectable 40 milliGRAM(s) SubCutaneous every 24 hours  glucagon  Injectable 1 milliGRAM(s) IntraMuscular once  influenza   Vaccine 0.5 milliLiter(s) IntraMuscular once  insulin lispro (ADMELOG) corrective regimen sliding scale   SubCutaneous three times a day before meals  insulin lispro (ADMELOG) corrective regimen sliding scale   SubCutaneous at bedtime  vancomycin  IVPB 1000 milliGRAM(s) IV Intermittent every 12 hours    MEDICATIONS  (PRN):  acetaminophen     Tablet .. 650 milliGRAM(s) Oral every 6 hours PRN Temp greater or equal to 38C (100.4F), Mild Pain (1 - 3)  albuterol    90 MICROgram(s) HFA Inhaler 2 Puff(s) Inhalation every 6 hours PRN Bronchospasm  dextrose Oral Gel 15 Gram(s) Oral once PRN Blood Glucose LESS THAN 70 milliGRAM(s)/deciliter    Vital Signs  T(C): 36.8 (04-03-24 @ 14:17), Max: 37.6 (04-02-24 @ 18:23)  T(F): 98.2 (04-03-24 @ 14:17), Max: 99.6 (04-02-24 @ 18:23)  HR: 82 (04-03-24 @ 14:17) (77 - 92)  BP: 133/72 (04-03-24 @ 14:17) (113/72 - 147/75)  RR: 18 (04-03-24 @ 14:17) (15 - 18)  SpO2: 100% (04-03-24 @ 14:17) (96% - 100%)    Physical Exam:  Gen:   HEENT:   Chest:   Cardiac:  Abd:   Ext:   Neuro:   Integument:     Labs:  CBC Full  -  ( 03 Apr 2024 07:17 )  WBC Count : 12.14 K/uL  RBC Count : 4.15 M/uL  Hemoglobin : 11.6 g/dL  Hematocrit : 34.7 %  Platelet Count - Automated : 339 K/uL  Mean Cell Volume : 83.6 fl  Mean Cell Hemoglobin : 28.0 pg  Mean Cell Hemoglobin Concentration : 33.4 gm/dL  Auto Neutrophil # : 9.42 K/uL  Auto Lymphocyte # : 1.60 K/uL  Auto Monocyte # : 0.88 K/uL  Auto Eosinophil # : 0.15 K/uL  Auto Basophil # : 0.05 K/uL  Auto Neutrophil % : 77.7 %  Auto Lymphocyte % : 13.2 %  Auto Monocyte % : 7.2 %  Auto Eosinophil % : 1.2 %  Auto Basophil % : 0.4 %    04-03    135  |  99  |  29<H>  ----------------------------<  242<H>  4.1   |  21<L>  |  1.12    Ca    9.0      03 Apr 2024 07:17    TPro  7.5  /  Alb  3.6  /  TBili  0.7  /  DBili  x   /  AST  9<L>  /  ALT  9<L>  /  AlkPhos  90  04-02      Bilirubin Total: 0.7 mg/dL (04-02-24 @ 17:43)   [facility] HEMATOLOGY/ON[facility]OGY CONSULT NOTE     HPI:  Mr. Menon is a 59 year old male active smoker with PMHx of  peripheral neuropathy, Type II DM,  anemia,  status post colectomy with colostomy 01/2024  due to rectal cancer under the care of Dr. Renny Purvis now admitted with osteomyelitis of the left foot.  MRI foot shows Findings suggestive of early osteomyelitis at the fifth metatarsal head and base of the proximal phalanx, deep to the soft tissue wound. Focal osteitis is also a differential consideration for the osseous findings. Podiatry pending resection with graft. Infectious disease consulted.    Pt stated he continued to smoke 1PPD, denied ETOH or illicit drug use. Lives at home with his son. Denied family hx of malignancy. He is currently on STD but normally works as a . Denied previous work place exposures.     ROS: pertinent positives and negatives as per HPI    Allergies: No Known Allergies    PMHx:  Colon cancer  COVID-19 vaccination refused  Tobacco dependence    SurgHx:   S/P colectomy  History of creation of ostomy    FHx:   Denied, Heart dx in parents     SocHx:   Active smoker 1PPD  Denied ETOH or illicit drug use  Works as   Lives at home with his son     Meds:   MEDICATIONS  (STANDING):  cefepime   IVPB 2000 milliGRAM(s) IV Intermittent every 8 hours  chlorhexidine 2% Cloths 1 Application(s) Topical <User Schedule>  dextrose 5%. 1000 milliLiter(s) (50 mL/Hr) IV Continuous <Continuous>  dextrose 5%. 1000 milliLiter(s) (100 mL/Hr) IV Continuous <Continuous>  dextrose 50% Injectable 25 Gram(s) IV Push once  dextrose 50% Injectable 12.5 Gram(s) IV Push once  dextrose 50% Injectable 25 Gram(s) IV Push once  enoxaparin Injectable 40 milliGRAM(s) SubCutaneous every 24 hours  glucagon  Injectable 1 milliGRAM(s) IntraMuscular once  influenza   Vaccine 0.5 milliLiter(s) IntraMuscular once  insulin lispro (ADMELOG) corrective regimen sliding scale   SubCutaneous three times a day before meals  insulin lispro (ADMELOG) corrective regimen sliding scale   SubCutaneous at bedtime  vancomycin  IVPB 1000 milliGRAM(s) IV Intermittent every 12 hours    MEDICATIONS  (PRN):  acetaminophen     Tablet .. 650 milliGRAM(s) Oral every 6 hours PRN Temp greater or equal to 38C (100.4F), Mild Pain (1 - 3)  albuterol    90 MICROgram(s) HFA Inhaler 2 Puff(s) Inhalation every 6 hours PRN Bronchospasm  dextrose Oral Gel 15 Gram(s) Oral once PRN Blood Glucose LESS THAN 70 milliGRAM(s)/deciliter    Vital Signs  T(C): 36.8 (04-03-24 @ 14:17), Max: 37.6 (04-02-24 @ 18:23)  T(F): 98.2 (04-03-24 @ 14:17), Max: 99.6 (04-02-24 @ 18:23)  HR: 82 (04-03-24 @ 14:17) (77 - 92)  BP: 133/72 (04-03-24 @ 14:17) (113/72 - 147/75)  RR: 18 (04-03-24 @ 14:17) (15 - 18)  SpO2: 100% (04-03-24 @ 14:17) (96% - 100%)    Physical Exam:  Gen: NAD  HEENT: EOMI, MMM  Chest: equal chest rise, speaking full sentences   Cardiac: RR  Abd: Nondistended  Ext: LLE cellulitis  Neuro: AAOx3, normal mood and affect   Labs:  CBC Full  -  ( 03 Apr 2024 07:17 )  WBC Count : 12.14 K/uL  RBC Count : 4.15 M/uL  Hemoglobin : 11.6 g/dL  Hematocrit : 34.7 %  Platelet Count - Automated : 339 K/uL  Mean Cell Volume : 83.6 fl  Mean Cell Hemoglobin : 28.0 pg  Mean Cell Hemoglobin Concentration : 33.4 gm/dL  Auto Neutrophil # : 9.42 K/uL  Auto Lymphocyte # : 1.60 K/uL  Auto Monocyte # : 0.88 K/uL  Auto Eosinophil # : 0.15 K/uL  Auto Basophil # : 0.05 K/uL  Auto Neutrophil % : 77.7 %  Auto Lymphocyte % : 13.2 %  Auto Monocyte % : 7.2 %  Auto Eosinophil % : 1.2 %  Auto Basophil % : 0.4 %    04-03    135  |  99  |  29<H>  ----------------------------<  242<H>  4.1   |  21<L>  |  1.12    Ca    9.0      03 Apr 2024 07:17    TPro  7.5  /  Alb  3.6  /  TBili  0.7  /  DBili  x   /  AST  9<L>  /  ALT  9<L>  /  AlkPhos  90  04-02      Bilirubin Total: 0.7 mg/dL (04-02-24 @ 17:43)

## 2024-04-03 NOTE — CONSULT NOTE ADULT - SUBJECTIVE AND OBJECTIVE BOX
[facility] DIVISION OF INFECTIOUS DISEASES  REGIS Khan S. Shah, Y. Patel, G. Melvin  [phone]  ([phone] - weekdays after 5pm and weekends)    EDA CHAWLA  59y, Male  [id number]    HPI:  59-year-old male history of stage IV colon cancer peripheral neuropathy sent in from his podiatrist office with concern for osteomyelitis of the left foot.  Patient was unaware of any infection but noticed some drainage between his second and third toes this morning prompting him to go to the podiatrist.  There he was found to have what is described as a "bad" foot infection with concern for osteo and was sent in for admission and IV antibiotics.  ROS: 14 point review of systems completed, pertinent positives and negatives as per HPI.    Allergies: No Known Allergies    PMH -- Colon cancer  COVID-19 vaccination refused  Tobacco dependence    PSH -- S/P colectomy  History of creation of ostomy    FH -- No pertinent family history in first degree relatives  Social History -- smoker, denies alcohol or illicit drug use    Physical Exam--  Vital Signs Last 24 Hrs  T(F): 99 (2024 09:07), Max: 99.6 (2024 18:23)  HR: 92 (2024 09:07) (77 - 105)  BP: 147/75 (2024 09:07) (113/72 - 147/75)  RR: 18 (2024 09:07) (15 - 18)  SpO2: 98% (2024 09:07) (96% - 99%)  General: no acute distress  HEENT: NC/AT, EOMI, anicteric, neck supple  Lungs: Clear bilaterally without rales, wheezing or rhonchi  Heart: S1, S2 present, RRR. No murmur, rub or gallop.  Abdomen: Soft. Nondistended. Nontender. BS present.   Neuro: AAOx3, no obvious focal deficits   Extremities: No cyanosis. No edema.   Skin: Warm. Dry.  No visible rash.   Psychiatric: Appropriate affect and mood for situation.   Lines: PIV    Laboratory & Imaging Data--  CBC:                       11.6   12.14 )-----------( 339      ( 2024 07:17 )             34.7     WBC Count: 12.14 K/uL (24 @ 07:17)  WBC Count: 14.12 K/uL (24 @ 17:43)    CMP:     135  |  99  |  29<H>  ----------------------------<  242<H>  4.1   |  21<L>  |  1.12    Ca    9.0      2024 07:17    TPro  7.5  /  Alb  3.6  /  TBili  0.7  /  DBili  x   /  AST  9<L>  /  ALT  9<L>  /  AlkPhos  90      LIVER FUNCTIONS - ( 2024 17:43 )  Alb: 3.6 g/dL / Pro: 7.5 g/dL / ALK PHOS: 90 U/L / ALT: 9 U/L / AST: 9 U/L / GGT: x           Urinalysis Basic - ( 2024 07:27 )    Color: Dark Yellow / Appearance: Clear / S.027 / pH: x  Gluc: x / Ketone: Negative mg/dL  / Bili: Negative / Urobili: 0.2 mg/dL   Blood: x / Protein: 300 mg/dL / Nitrite: Negative   Leuk Esterase: Negative / RBC: 3 /HPF / WBC 1 /HPF   Sq Epi: x / Non Sq Epi: 2 /HPF / Bacteria: Negative /HPF      Microbiology: reviewed  Culture - Abscess with Gram Stain (collected 24 @ 17:40)  Source: .Abscess left foot  Gram Stain (24 @ 05:56):    No polymorphonuclear leukocytes seen per low power field    Numerous Gram positive cocci in pairs seen per oil power field    Radiology--reviewed  < from: MR Foot w/wo IV Cont, Left (24 @ 23:00) >  IMPRESSION:  Findings suggestive of early osteomyelitis at the fifth metatarsal head   and base of the proximal phalanx, deep to the soft tissue wound. Focal   osteitis is also a differential consideration for the osseous findings..    < end of copied text >  < from: Xray Chest 1 View AP/PA (24 @ 14:52) >  IMPRESSION:    No radiographic evidence of acute cardiopulmonary disease. Right-sided   chest wall port with tip in proper position.    < end of copied text >  < from: Xray Foot AP + Lateral + Oblique, Left (24 @ 14:51) >  IMPRESSION:  No tracking soft tissue gas collections, radiopaque foreign bodies, or   gross radiographic evidence for osteomyelitis.    No fractures or dislocations.    Tarsometatarsal alignment maintained without evidence for a Lisfranc   injury.    Congenitally fused 5th DIP joint. Preserved remaining visualized joint   spaces and no joint margin erosions.    No lytic or blastic lesions.    < end of copied text >    Active Medications--  acetaminophen     Tablet .. 650 milliGRAM(s) Oral every 6 hours PRN  albuterol    90 MICROgram(s) HFA Inhaler 2 Puff(s) Inhalation every 6 hours PRN  cefepime   IVPB 2000 milliGRAM(s) IV Intermittent every 8 hours  dextrose 5%. 1000 milliLiter(s) IV Continuous <Continuous>  dextrose 5%. 1000 milliLiter(s) IV Continuous <Continuous>  dextrose 50% Injectable 12.5 Gram(s) IV Push once  dextrose 50% Injectable 25 Gram(s) IV Push once  dextrose 50% Injectable 25 Gram(s) IV Push once  dextrose Oral Gel 15 Gram(s) Oral once PRN  enoxaparin Injectable 40 milliGRAM(s) SubCutaneous every 24 hours  glucagon  Injectable 1 milliGRAM(s) IntraMuscular once  insulin lispro (ADMELOG) corrective regimen sliding scale   SubCutaneous three times a day before meals  insulin lispro (ADMELOG) corrective regimen sliding scale   SubCutaneous at bedtime  vancomycin  IVPB 1000 milliGRAM(s) IV Intermittent every 12 hours    Current Antimicrobials:   cefepime   IVPB 2000 milliGRAM(s) IV Intermittent every 8 hours  vancomycin  IVPB 1000 milliGRAM(s) IV Intermittent every 12 hours    Prior/Completed Antimicrobials:  cefepime   IVPB  vancomycin  IVPB.    Immunologic:      [facility] INFECTIOUS DISEASES  REGIS Khan, RG Downey G. [facility]  [phone]  ([phone] - weekdays after 5pm and weekends)    EDA CHAWLA  59y, Male  [id number]    HPI:  Patient is a 59 year old male with PMH of stage IV colon cancer, s/p colectomy with ostomy 2024, peripheral neuropathy sent in from his podiatrist office with concern for osteomyelitis of the left foot.  Patient was unaware of any infection but noticed some drainage between his second and third toes this morning prompting him to go to the podiatrist.  There he was found to have what is described as a "bad" foot infection with concern for osteo and was sent in for admission and IV antibiotics. Patient seen and examined at bedside this morning in the ER. Patient confirms history, denies any fever, chills, nausea, vomiting, or foot pain.   ROS: 14 point review of systems completed, pertinent positives and negatives as per HPI.    Allergies: No Known Allergies    PMH -- Colon cancer  COVID-19 vaccination refused  Tobacco dependence    PSH -- S/P colectomy  History of creation of ostomy    FH -- No pertinent family history in first degree relatives  Social History -- smoker, denies alcohol or illicit drug use    Physical Exam--  Vital Signs Last 24 Hrs  T(F): 99 (2024 09:07), Max: 99.6 (2024 18:23)  HR: 92 (2024 09:07) (77 - 105)  BP: 147/75 (2024 09:07) (113/72 - 147/75)  RR: 18 (2024 09:07) (15 - 18)  SpO2: 98% (2024 09:07) (96% - 99%)  General: no acute distress  HEENT: NC/AT, EOMI, anicteric, neck supple  Lungs: Clear bilaterally without rales, wheezing or rhonchi  Heart: S1, S2 present, RRR. No murmur, rub or gallop.  Abdomen: Soft. ND, NT, L sided ostomy   Neuro: AAOx3, no obvious focal deficits   Extremities: No cyanosis. No edema. L foot dressing   Skin: Warm. Dry.  No visible rash.   Psychiatric: Appropriate affect and mood for situation.   Lines: PIV    Laboratory & Imaging Data--  CBC:                       11.6   12.14 )-----------( 339      ( 2024 07:17 )             34.7     WBC Count: 12.14 K/uL (24 @ 07:17)  WBC Count: 14.12 K/uL (24 @ 17:43)    CMP:     135  |  99  |  29<H>  ----------------------------<  242<H>  4.1   |  21<L>  |  1.12    Ca    9.0      2024 07:17    TPro  7.5  /  Alb  3.6  /  TBili  0.7  /  DBili  x   /  AST  9<L>  /  ALT  9<L>  /  AlkPhos  90  04-02    LIVER FUNCTIONS - ( 2024 17:43 )  Alb: 3.6 g/dL / Pro: 7.5 g/dL / ALK PHOS: 90 U/L / ALT: 9 U/L / AST: 9 U/L / GGT: x           Urinalysis Basic - ( 2024 07:27 )  Color: Dark Yellow / Appearance: Clear / S.027 / pH: x  Gluc: x / Ketone: Negative mg/dL  / Bili: Negative / Urobili: 0.2 mg/dL   Blood: x / Protein: 300 mg/dL / Nitrite: Negative   Leuk Esterase: Negative / RBC: 3 /HPF / WBC 1 /HPF   Sq Epi: x / Non Sq Epi: 2 /HPF / Bacteria: Negative /HPF      Microbiology: reviewed  Culture - Abscess with Gram Stain (collected 24 @ 17:40)  Source: .Abscess left foot  Gram Stain (24 @ 05:56):    No polymorphonuclear leukocytes seen per low power field    Numerous Gram positive cocci in pairs seen per oil power field    Radiology--reviewed  < from: MR Foot w/wo IV Cont, Left (24 @ 23:00) >  IMPRESSION:  Findings suggestive of early osteomyelitis at the fifth metatarsal head   and base of the proximal phalanx, deep to the soft tissue wound. Focal   osteitis is also a differential consideration for the osseous findings..    < end of copied text >  < from: Xray Chest 1 View AP/PA (24 @ 14:52) >  IMPRESSION:    No radiographic evidence of acute cardiopulmonary disease. Right-sided   chest wall port with tip in proper position.    < end of copied text >  < from: Xray Foot AP + Lateral + Oblique, Left (24 @ 14:51) >  IMPRESSION:  No tracking soft tissue gas collections, radiopaque foreign bodies, or   gross radiographic evidence for osteomyelitis.    No fractures or dislocations.    Tarsometatarsal alignment maintained without evidence for a Lisfranc   injury.    Congenitally fused 5th DIP joint. Preserved remaining visualized joint   spaces and no joint margin erosions.    No lytic or blastic lesions.    < end of copied text >    Active Medications--  acetaminophen     Tablet .. 650 milliGRAM(s) Oral every 6 hours PRN  albuterol    90 MICROgram(s) HFA Inhaler 2 Puff(s) Inhalation every 6 hours PRN  cefepime   IVPB 2000 milliGRAM(s) IV Intermittent every 8 hours  dextrose 5%. 1000 milliLiter(s) IV Continuous <Continuous>  dextrose 5%. 1000 milliLiter(s) IV Continuous <Continuous>  dextrose 50% Injectable 12.5 Gram(s) IV Push once  dextrose 50% Injectable 25 Gram(s) IV Push once  dextrose 50% Injectable 25 Gram(s) IV Push once  dextrose Oral Gel 15 Gram(s) Oral once PRN  enoxaparin Injectable 40 milliGRAM(s) SubCutaneous every 24 hours  glucagon  Injectable 1 milliGRAM(s) IntraMuscular once  insulin lispro (ADMELOG) corrective regimen sliding scale   SubCutaneous three times a day before meals  insulin lispro (ADMELOG) corrective regimen sliding scale   SubCutaneous at bedtime  vancomycin  IVPB 1000 milliGRAM(s) IV Intermittent every 12 hours    Current Antimicrobials:   cefepime   IVPB 2000 milliGRAM(s) IV Intermittent every 8 hours  vancomycin  IVPB 1000 milliGRAM(s) IV Intermittent every 12 hours    Prior/Completed Antimicrobials:  cefepime   IVPB  vancomycin  IVPB.

## 2024-04-03 NOTE — CONSULT NOTE ADULT - ASSESSMENT
Patient is a 59 year old male with PMH of stage IV colon cancer, s/p colectomy with ostomy 1/2024, peripheral neuropathy sent in from his podiatrist office with concern for osteomyelitis of the left foot.     L foot submet 5th wound with suspected early OM  Leukocytosis likely due to above   - noted L foot submet 5 wound to bone, fibronecrotic wound bed, tracking 1cm circumferentially malodorous, serous drainage. erythema to midfoot. S/p wound explored and 1cm incision proximal to wound down to level of subq, mild serous drainage expressed   - L foot xray with no evidence of OM  - MRI L foot with findings suggestive of early OM at 5th metatarsal head and base of proximal phalanx, deep to soft tissue wound   - started on empiric vancomycin and cefepime   - afebrile, WBC noted, elevated ESR/CRP (99/140)    Recommendations:   Podiatry following   Follow L foot wound culture -- gram stain noted   Follow blood cultures - in process x2   Continue vancomycin and cefepime for now   - monitor vancomycin trough, goal -600  Continue local wound care    Monitor temps/WBC      Cherelle Horvath M.D.  [facility], Division of Infectious Diseases  [phone]  After 5pm on weekdays and all day on weekends - please call [phone]

## 2024-04-03 NOTE — PROGRESS NOTE ADULT - ASSESSMENT
59 y.o M w/ left foot submet 5 wound to bone   - Pt was seen and evaluated.   - Afebrile, WBC 12, ESR 99, CRP 14  - Left foot submet 5 wound to bone, fibronecrotic wound bed, tracking 1cm circumferentially malodorous, serous drainage. erythema to midfoot. Right foot with no open wounds or signs of infection.   - Left foot xray: no gas, no OM  - Ordered ABIs/PVR - consult vascular if abnormal findings   - Left  foot wound culture pending   - Left foot MRI: 5th met head OM, base of proximal phalanx OM  - Pod Plan: Left foot 5th met head resection w/ graft application  - Please document medical and cardiac clearance for podiatric surgery   - Discussed with attending.   59 y.o M w/ left foot submet 5 wound to bone   - Pt was seen and evaluated.   - Afebrile, WBC 12, ESR 99, CRP 14  - Left foot submet 5 wound to bone, fibronecrotic wound bed, tracking 1cm circumferentially malodorous, serous drainage. erythema to midfoot. Right foot with no open wounds or signs of infection.   - Left foot xray: no gas, no OM  - Ordered ABIs/PVR - consult vascular if abnormal findings   - Left  foot wound culture pending   - Left foot MRI: 5th met head OM, base of proximal phalanx OM  - Pod Plan: Left foot 5th met head resection w/ graft application  - Please document medical and cardiac clearance for podiatric surgery   - Seen with attending.

## 2024-04-03 NOTE — PROGRESS NOTE ADULT - SUBJECTIVE AND OBJECTIVE BOX
Patient is a 59y old  Male who presents with a chief complaint of Sent to the ER by PCP following concern for LEFT foot infection (2024 09:45)       INTERVAL HPI/OVERNIGHT EVENTS:  Patient seen and evaluated at bedside.  Pt is resting comfortable in NAD. Denies N/V/F/C.    Allergies    No Known Allergies    Intolerances        Vital Signs Last 24 Hrs  T(C): 36.8 (2024 10:30), Max: 37.6 (2024 18:23)  T(F): 98.2 (2024 10:30), Max: 99.6 (2024 18:23)  HR: 82 (2024 10:30) (77 - 105)  BP: 144/71 (2024 10:30) (113/72 - 147/75)  BP(mean): --  RR: 18 (2024 10:30) (15 - 18)  SpO2: 99% (2024 10:30) (96% - 99%)    Parameters below as of 2024 10:30  Patient On (Oxygen Delivery Method): room air        LABS:                        11.6   12.14 )-----------( 339      ( 2024 07:17 )             34.7     04-03    135  |  99  |  29<H>  ----------------------------<  242<H>  4.1   |  21<L>  |  1.12    Ca    9.0      2024 07:17    TPro  7.5  /  Alb  3.6  /  TBili  0.7  /  DBili  x   /  AST  9<L>  /  ALT  9<L>  /  AlkPhos  90  04-02      Urinalysis Basic - ( 2024 07:27 )    Color: Dark Yellow / Appearance: Clear / S.027 / pH: x  Gluc: x / Ketone: Negative mg/dL  / Bili: Negative / Urobili: 0.2 mg/dL   Blood: x / Protein: 300 mg/dL / Nitrite: Negative   Leuk Esterase: Negative / RBC: 3 /HPF / WBC 1 /HPF   Sq Epi: x / Non Sq Epi: 2 /HPF / Bacteria: Negative /HPF      CAPILLARY BLOOD GLUCOSE      POCT Blood Glucose.: 243 mg/dL (2024 07:28)  POCT Blood Glucose.: 161 mg/dL (2024 23:24)      Lower Extremity Physical Exam:  Vascular: DP/PT 2/4, B/L, CFT <3  seconds B/L, Temperature gradient warm to cool B/L.   Neuro: Epicritic sensation diminished to the level of digits, B/L.  Musculoskeletal/Ortho: unremarkable   Skin: Left foot submet 5 wound to bone, fibronecrotic wound bed, tracking 1cm circumferentially malodorous, serous drainage. erythema to midfoot. Right foot with no open wounds or signs of infection.       RADIOLOGY & ADDITIONAL TESTS:

## 2024-04-03 NOTE — CONSULT NOTE ADULT - SUBJECTIVE AND OBJECTIVE BOX
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   Patient admits that he does not normally inspect his feet (patient with a healed RIGHT plantar 5th MTP ulcer) until today.  Seen by Podiatry in the ER with LEFT foot submet 5 wound with dressing changed by Podiatry.  NO fever, no chills, no rigors.  NO N/V.    NO chest pain/pressure.  NO palpitations.  NO cough, no dyspnoea.  NO abdominal pain, no ostomy pain.  NO red blood per rectum or ostomy.  No back pain, no tearing back pain.  NO dysuria. (02 Apr 2024 22:00)      PAST MEDICAL & SURGICAL HISTORY:  Colon cancer      COVID-19 vaccination refused      Tobacco dependence      S/P colectomy      History of creation of ostomy              MEDICATIONS:  enoxaparin Injectable 40 milliGRAM(s) SubCutaneous every 24 hours    cefepime   IVPB 2000 milliGRAM(s) IV Intermittent every 8 hours  vancomycin  IVPB 1000 milliGRAM(s) IV Intermittent every 12 hours    albuterol    90 MICROgram(s) HFA Inhaler 2 Puff(s) Inhalation every 6 hours PRN    acetaminophen     Tablet .. 650 milliGRAM(s) Oral every 6 hours PRN      dextrose 50% Injectable 25 Gram(s) IV Push once  dextrose 50% Injectable 12.5 Gram(s) IV Push once  dextrose 50% Injectable 25 Gram(s) IV Push once  dextrose Oral Gel 15 Gram(s) Oral once PRN  glucagon  Injectable 1 milliGRAM(s) IntraMuscular once  insulin lispro (ADMELOG) corrective regimen sliding scale   SubCutaneous three times a day before meals  insulin lispro (ADMELOG) corrective regimen sliding scale   SubCutaneous at bedtime    chlorhexidine 2% Cloths 1 Application(s) Topical <User Schedule>  dextrose 5%. 1000 milliLiter(s) IV Continuous <Continuous>  dextrose 5%. 1000 milliLiter(s) IV Continuous <Continuous>  influenza   Vaccine 0.5 milliLiter(s) IntraMuscular once      FAMILY HISTORY:  No pertinent family history in first degree relatives        Non-contributory    SOCIAL HISTORY:    [ ] not a smoker    Allergies    No Known Allergies    Intolerances    	    REVIEW OF SYSTEMS:  CONSTITUTIONAL: No fever  EYES: No eye pain, visual disturbances, or discharge  ENMT:  No difficulty hearing, tinnitus  NECK: No pain or stiffness  RESPIRATORY: No cough, wheezing,  CARDIOVASCULAR: No chest pain, palpitations, passing out, dizziness, or leg swelling  GASTROINTESTINAL:  No nausea, vomiting, diarrhea or constipation. No melena.  GENITOURINARY: No dysuria, hematuria  NEUROLOGICAL: No stroke like symptoms  SKIN: No burning or lesions   ENDOCRINE: No heat or cold intolerance  MUSCULOSKELETAL: No joint pain or swelling  PSYCHIATRIC: No  anxiety, mood swings  HEME/LYMPH: No bleeding gums  ALLERGY AND IMMUNOLOGIC: No hives or eczema	    All other ROS negative    PHYSICAL EXAM:  T(C): 36.7 (04-03-24 @ 20:18), Max: 37.3 (04-03-24 @ 04:30)  HR: 87 (04-03-24 @ 20:18) (82 - 92)  BP: 149/69 (04-03-24 @ 20:18) (123/72 - 149/69)  RR: 17 (04-03-24 @ 20:18) (17 - 18)  SpO2: 95% (04-03-24 @ 20:18) (95% - 100%)  Wt(kg): --  I&O's Summary    03 Apr 2024 07:01  -  03 Apr 2024 23:22  --------------------------------------------------------  IN: 240 mL / OUT: 800 mL / NET: -560 mL        Appearance: Normal	  HEENT:   Normal oral mucosa, EOMI	  Cardiovascular:  S1 S2, No JVD,    Respiratory: Lungs clear to auscultation	  Psychiatry: Alert  Gastrointestinal:  Soft, Non-tender, + BS	  Skin: No rashes   Neurologic: Non-focal  Extremities:  No edema  Vascular: Peripheral pulses palpable    	    	  	  CARDIAC MARKERS:  Labs personally reviewed by me                                  11.6   12.14 )-----------( 339      ( 03 Apr 2024 07:17 )             34.7     04-03    135  |  99  |  29<H>  ----------------------------<  242<H>  4.1   |  21<L>  |  1.12    Ca    9.0      03 Apr 2024 07:17    TPro  7.5  /  Alb  3.6  /  TBili  0.7  /  DBili  x   /  AST  9<L>  /  ALT  9<L>  /  AlkPhos  90  04-02          EKG: Personally reviewed by me - NSR inferior wall q wavea  Radiology: Personally reviewed by me - CXR No radiographic evidence of acute cardiopulmonary disease. Right-sided   chest wall port with tip in proper position.        Assessment and Plan: 	  This is a 59 year old male PMH: Colon ca, Tobacco dependance.  Admitted for left foot pain. + Ulcer      1. Preop Risk Stratification - TTE unremarkable with preserved EF  - EKG non ischemic   - Reports good functional capacity prior to diabetic foot ulcer  - Elevated risk for mod risk pods surgery, no contraindication to proceed    2.   Diabetic foot ulcer  - MRI foot  suggestive of early osteomyelitis   - C/w local wound care per Pod  - Abx as per primary team  - POD following-> Local wound care versus possible left foot 5th met head resection    3.  Dm2:  - check HgbA1c    4 DVT ppx:  - Lovenox sq            Differential diagnosis and plan of care discussed with patient after the evaluation. Counseling on diet, nutritional counseling, weight management, exercise and medication compliance was done.   Advanced care planning/advanced directives discussed with patient/family. DNR status including forceful chest compressions to attempt to restart the heart, ventilator support/artificial breathing, electric shock, artificial nutrition, health care proxy, Molst form all discussed with pt. Pt wishes to consider. Sixteen minutes spent on discussing advanced directives.           Nuno Yeager DO [facility]  Cardiovascular Medicine  [address], [address]  Office [phone]  Available via call/text on [employer] Teams

## 2024-04-03 NOTE — PROGRESS NOTE ADULT - ASSESSMENT
This is a 59 year old male PMH: Colon ca, Tobacco dependance.  Admitted for left foot pain. + Ulcer    Plan:    # Left foot pain r/o Diabetic foot ulcer/ Osteomyelitis:  -  CXR w/ clear lungs  - MRI foot  suggestive of early osteomyelitis at the fifth metatarsal head   and base of the proximal phalanx, deep to the soft tissue wound. Focal   osteitis is also a differential consideration for the osseous findings.  - Pain control  - C/w local wound care per Pod  - C/w IV abx  - VA duplex pending  - Left foot Cx w/ + GPC's  - ID eval pending (called)  - POD following-> Local wound care versus possible left foot 5th met head resection    # Colon ca:  - Hem/ onc eval pending (called)    # Abnormal EKG:  - Echo pending  - Cards eval pending (called)    # Dm2:  - HgbA1c  - Continue to monitor blood glucose levels  - Sliding Scale    # GI ppx:  - Bowel regimen prn    # DVT ppx:  - IPC's  - Lovenox sq    [employer]

## 2024-04-04 DIAGNOSIS — C20 MALIGNANT NEOPLASM OF RECTUM: ICD-10-CM

## 2024-04-04 DIAGNOSIS — M86.9 OSTEOMYELITIS, UNSPECIFIED: ICD-10-CM

## 2024-04-04 DIAGNOSIS — Z01.818 ENCOUNTER FOR OTHER PREPROCEDURAL EXAMINATION: ICD-10-CM

## 2024-04-04 LAB
-  AMOXICILLIN/CLAVULANIC ACID: SIGNIFICANT CHANGE UP
-  AMPICILLIN/SULBACTAM: SIGNIFICANT CHANGE UP
-  AMPICILLIN: SIGNIFICANT CHANGE UP
-  AZTREONAM: SIGNIFICANT CHANGE UP
-  CEFAZOLIN: SIGNIFICANT CHANGE UP
-  CEFEPIME: SIGNIFICANT CHANGE UP
-  CEFOXITIN: SIGNIFICANT CHANGE UP
-  CEFTRIAXONE: SIGNIFICANT CHANGE UP
-  CIPROFLOXACIN: SIGNIFICANT CHANGE UP
-  ERTAPENEM: SIGNIFICANT CHANGE UP
-  GENTAMICIN: SIGNIFICANT CHANGE UP
-  IMIPENEM: SIGNIFICANT CHANGE UP
-  LEVOFLOXACIN: SIGNIFICANT CHANGE UP
-  MEROPENEM: SIGNIFICANT CHANGE UP
-  PIPERACILLIN/TAZOBACTAM: SIGNIFICANT CHANGE UP
-  TOBRAMYCIN: SIGNIFICANT CHANGE UP
-  TRIMETHOPRIM/SULFAMETHOXAZOLE: SIGNIFICANT CHANGE UP
ANION GAP SERPL CALC-SCNC: 14 MMOL/L — SIGNIFICANT CHANGE UP (ref 5–17)
BLD GP AB SCN SERPL QL: NEGATIVE — SIGNIFICANT CHANGE UP
BUN SERPL-MCNC: 21 MG/DL — SIGNIFICANT CHANGE UP (ref 7–23)
CALCIUM SERPL-MCNC: 9.1 MG/DL — SIGNIFICANT CHANGE UP (ref 8.4–10.5)
CHLORIDE SERPL-SCNC: 101 MMOL/L — SIGNIFICANT CHANGE UP (ref 96–108)
CO2 SERPL-SCNC: 21 MMOL/L — LOW (ref 22–31)
CREAT SERPL-MCNC: 0.95 MG/DL — SIGNIFICANT CHANGE UP (ref 0.5–1.3)
CULTURE RESULTS: ABNORMAL
EGFR: 93 ML/MIN/1.73M2 — SIGNIFICANT CHANGE UP
GLUCOSE BLDC GLUCOMTR-MCNC: 111 MG/DL — HIGH (ref 70–99)
GLUCOSE BLDC GLUCOMTR-MCNC: 124 MG/DL — HIGH (ref 70–99)
GLUCOSE BLDC GLUCOMTR-MCNC: 132 MG/DL — HIGH (ref 70–99)
GLUCOSE BLDC GLUCOMTR-MCNC: 142 MG/DL — HIGH (ref 70–99)
GLUCOSE SERPL-MCNC: 126 MG/DL — HIGH (ref 70–99)
HCT VFR BLD CALC: 33.1 % — LOW (ref 39–50)
HGB BLD-MCNC: 11.4 G/DL — LOW (ref 13–17)
MCHC RBC-ENTMCNC: 28.6 PG — SIGNIFICANT CHANGE UP (ref 27–34)
MCHC RBC-ENTMCNC: 34.4 GM/DL — SIGNIFICANT CHANGE UP (ref 32–36)
MCV RBC AUTO: 83.2 FL — SIGNIFICANT CHANGE UP (ref 80–100)
METHOD TYPE: SIGNIFICANT CHANGE UP
NRBC # BLD: 0 /100 WBCS — SIGNIFICANT CHANGE UP (ref 0–0)
ORGANISM # SPEC MICROSCOPIC CNT: ABNORMAL
ORGANISM # SPEC MICROSCOPIC CNT: ABNORMAL
PLATELET # BLD AUTO: 337 K/UL — SIGNIFICANT CHANGE UP (ref 150–400)
POTASSIUM SERPL-MCNC: 4.2 MMOL/L — SIGNIFICANT CHANGE UP (ref 3.5–5.3)
POTASSIUM SERPL-SCNC: 4.2 MMOL/L — SIGNIFICANT CHANGE UP (ref 3.5–5.3)
RBC # BLD: 3.98 M/UL — LOW (ref 4.2–5.8)
RBC # FLD: 12 % — SIGNIFICANT CHANGE UP (ref 10.3–14.5)
RH IG SCN BLD-IMP: POSITIVE — SIGNIFICANT CHANGE UP
SODIUM SERPL-SCNC: 136 MMOL/L — SIGNIFICANT CHANGE UP (ref 135–145)
VANCOMYCIN TROUGH SERPL-MCNC: 8.5 UG/ML — LOW (ref 10–20)
WBC # BLD: 11.47 K/UL — HIGH (ref 3.8–10.5)
WBC # FLD AUTO: 11.47 K/UL — HIGH (ref 3.8–10.5)

## 2024-04-04 RX ADMIN — CEFEPIME 100 MILLIGRAM(S): 1 INJECTION, POWDER, FOR SOLUTION INTRAMUSCULAR; INTRAVENOUS at 06:16

## 2024-04-04 RX ADMIN — Medication 250 MILLIGRAM(S): at 06:08

## 2024-04-04 RX ADMIN — CEFEPIME 100 MILLIGRAM(S): 1 INJECTION, POWDER, FOR SOLUTION INTRAMUSCULAR; INTRAVENOUS at 15:09

## 2024-04-04 RX ADMIN — CEFEPIME 100 MILLIGRAM(S): 1 INJECTION, POWDER, FOR SOLUTION INTRAMUSCULAR; INTRAVENOUS at 22:23

## 2024-04-04 NOTE — PROGRESS NOTE ADULT - SUBJECTIVE AND OBJECTIVE BOX
EDA CHAWLA is a 59y Male active smoker with a hx of Stage IV rectal adenocarcinoma s/p colectomy with colostomy (1/2024) and T2DM c/b peripheral neuropathy who presented from PCP concerning for L foot infection, found to have L fifth metatarsal osteomyelitis.     INTERVAL HX:  - OR for L foot fifth metatarsal head resection with wound debridement and graft application today    Review of Systems: Focused ROS negative other that those stated above.    Allergies:  No Known Allergies    Medications:  acetaminophen     Tablet .. 650 milliGRAM(s) Oral every 6 hours PRN  albuterol    90 MICROgram(s) HFA Inhaler 2 Puff(s) Inhalation every 6 hours PRN  cefepime   IVPB 2000 milliGRAM(s) IV Intermittent every 8 hours  chlorhexidine 2% Cloths 1 Application(s) Topical <User Schedule>  dextrose 5%. 1000 milliLiter(s) IV Continuous <Continuous>  dextrose 5%. 1000 milliLiter(s) IV Continuous <Continuous>  dextrose 50% Injectable 25 Gram(s) IV Push once  dextrose 50% Injectable 25 Gram(s) IV Push once  dextrose 50% Injectable 12.5 Gram(s) IV Push once  dextrose Oral Gel 15 Gram(s) Oral once PRN  enoxaparin Injectable 40 milliGRAM(s) SubCutaneous every 24 hours  glucagon  Injectable 1 milliGRAM(s) IntraMuscular once  influenza   Vaccine 0.5 milliLiter(s) IntraMuscular once  insulin lispro (ADMELOG) corrective regimen sliding scale   SubCutaneous three times a day before meals  insulin lispro (ADMELOG) corrective regimen sliding scale   SubCutaneous at bedtime  vancomycin  IVPB 1000 milliGRAM(s) IV Intermittent every 12 hours    Vitals:  T(C): 37.1 (04-04-24 @ 04:00), Max: 37.6 (04-03-24 @ 23:59)  HR: 88 (04-04-24 @ 04:00) (82 - 92)  BP: 120/72 (04-04-24 @ 04:00) (120/72 - 149/69)  RR: 18 (04-04-24 @ 04:00) (17 - 18)  SpO2: 99% (04-04-24 @ 04:00) (95% - 100%)  I/O's:    04-03-24 @ 07:01  -  04-04-24 @ 07:00  --------------------------------------------------------  IN: 540 mL / OUT: 1525 mL / NET: -985 mL      Physical Exam:  CONSTITUTIONAL: no apparent distress.  SKIN: No rashes or ecchymosis.  EYES: PERRLA. EOMI. No scleral icterus.  ENT: No JVD. Supple, no thyromegaly. No discharge or glossitis. Oral mucosa with moist membranes. Hearing intact bilaterally.  LYMPH: No lymphadenopathy.  CV: RRR. Normal S1 and S2. No murmurs, rubs, or gallops. No edema. Pulses equal bilaterally.  PULM: Clear to auscultation throughout. Respirations unlabored. No wheezing, rales, or rhonchi.  GI: Soft, non-tender, non-distended. No palpable masses. No hematosplenomegaly. Normal bowel sounds.  MSK: Normal gait. No cyanosis or clubbing. Normal ROM. No spinal tenderness.  NEURO: CN II-XII intact. Strength 5/5 throughout. Sensation normal throughout. Reflexes +2 throughout.  PSYCH: A+O x3. Appropriate insight/judgment. Mood and affect appropriate. Recent/remote memory intact.    Labs:                        11.4   11.47 )-----------( 337      ( 04 Apr 2024 07:17 )             33.1     04-03    135  |  99  |  29<H>  ----------------------------<  242<H>  4.1   |  21<L>  |  1.12    Ca    9.0      03 Apr 2024 07:17    TPro  7.5  /  Alb  3.6  /  TBili  0.7  /  DBili  x   /  AST  9<L>  /  ALT  9<L>  /  AlkPhos  90  04-02    Radiology/Procedures: Reviewed.   EDA CHAWLA is a 59y Male active smoker with a hx of Stage IV rectal adenocarcinoma s/p colectomy with colostomy (1/2024) and T2DM c/b peripheral neuropathy who presented from PCP concerning for L foot infection, found to have L fifth metatarsal osteomyelitis.     INTERVAL HX:  - no acute events overnight    Review of Systems: Focused ROS negative other that those stated above.    Allergies:  No Known Allergies    Medications:  acetaminophen     Tablet .. 650 milliGRAM(s) Oral every 6 hours PRN  albuterol    90 MICROgram(s) HFA Inhaler 2 Puff(s) Inhalation every 6 hours PRN  cefepime   IVPB 2000 milliGRAM(s) IV Intermittent every 8 hours  chlorhexidine 2% Cloths 1 Application(s) Topical <User Schedule>  dextrose 5%. 1000 milliLiter(s) IV Continuous <Continuous>  dextrose 5%. 1000 milliLiter(s) IV Continuous <Continuous>  dextrose 50% Injectable 25 Gram(s) IV Push once  dextrose 50% Injectable 25 Gram(s) IV Push once  dextrose 50% Injectable 12.5 Gram(s) IV Push once  dextrose Oral Gel 15 Gram(s) Oral once PRN  enoxaparin Injectable 40 milliGRAM(s) SubCutaneous every 24 hours  glucagon  Injectable 1 milliGRAM(s) IntraMuscular once  influenza   Vaccine 0.5 milliLiter(s) IntraMuscular once  insulin lispro (ADMELOG) corrective regimen sliding scale   SubCutaneous three times a day before meals  insulin lispro (ADMELOG) corrective regimen sliding scale   SubCutaneous at bedtime  vancomycin  IVPB 1000 milliGRAM(s) IV Intermittent every 12 hours    Vitals:  T(C): 37.1 (04-04-24 @ 04:00), Max: 37.6 (04-03-24 @ 23:59)  HR: 88 (04-04-24 @ 04:00) (82 - 92)  BP: 120/72 (04-04-24 @ 04:00) (120/72 - 149/69)  RR: 18 (04-04-24 @ 04:00) (17 - 18)  SpO2: 99% (04-04-24 @ 04:00) (95% - 100%)  I/O's:    04-03-24 @ 07:01  -  04-04-24 @ 07:00  --------------------------------------------------------  IN: 540 mL / OUT: 1525 mL / NET: -985 mL      Physical Exam:  CONSTITUTIONAL: no apparent distress.  SKIN: No rashes or ecchymosis.  EYES: PERRLA. EOMI. No scleral icterus.  ENT: Supple. No discharge or glossitis. Oral mucosa with moist membranes. Hearing intact bilaterally.  CV: RRR. Normal S1 and S2. No murmurs, rubs, or gallops. No edema. Pulses equal bilaterally.  PULM: Clear to auscultation throughout. Respirations unlabored. No wheezing, rales, or rhonchi.  GI: Soft, non-tender, non-distended. No palpable masses.  MSK: (+) L foot wrapped. No cyanosis or clubbing.  NEURO: CN grossly normal  PSYCH: A+O, appropriately communicating and responding to questions    Labs:                        11.4   11.47 )-----------( 337      ( 04 Apr 2024 07:17 )             33.1     04-03    135  |  99  |  29<H>  ----------------------------<  242<H>  4.1   |  21<L>  |  1.12    Ca    9.0      03 Apr 2024 07:17    TPro  7.5  /  Alb  3.6  /  TBili  0.7  /  DBili  x   /  AST  9<L>  /  ALT  9<L>  /  AlkPhos  90  04-02    Radiology/Procedures: Reviewed.   EDA CHAWLA is a 59y Male active smoker with a hx of Stage IV rectal adenocarcinoma s/p colectomy with colostomy (1/2024) and T2DM c/b peripheral neuropathy who presented from PCP concerning for L foot infection, found to have L fifth metatarsal osteomyelitis.     INTERVAL HX:  - no acute events overnight    Review of Systems: Focused ROS negative other that those stated above.    Allergies:  No Known Allergies    Medications:  acetaminophen     Tablet .. 650 milliGRAM(s) Oral every 6 hours PRN  albuterol    90 MICROgram(s) HFA Inhaler 2 Puff(s) Inhalation every 6 hours PRN  cefepime   IVPB 2000 milliGRAM(s) IV Intermittent every 8 hours  chlorhexidine 2% Cloths 1 Application(s) Topical <User Schedule>  dextrose 5%. 1000 milliLiter(s) IV Continuous <Continuous>  dextrose 5%. 1000 milliLiter(s) IV Continuous <Continuous>  dextrose 50% Injectable 25 Gram(s) IV Push once  dextrose 50% Injectable 25 Gram(s) IV Push once  dextrose 50% Injectable 12.5 Gram(s) IV Push once  dextrose Oral Gel 15 Gram(s) Oral once PRN  enoxaparin Injectable 40 milliGRAM(s) SubCutaneous every 24 hours  glucagon  Injectable 1 milliGRAM(s) IntraMuscular once  influenza   Vaccine 0.5 milliLiter(s) IntraMuscular once  insulin lispro (ADMELOG) corrective regimen sliding scale   SubCutaneous three times a day before meals  insulin lispro (ADMELOG) corrective regimen sliding scale   SubCutaneous at bedtime  vancomycin  IVPB 1000 milliGRAM(s) IV Intermittent every 12 hours    Vitals:  T(C): 37.1 (04-04-24 @ 04:00), Max: 37.6 (04-03-24 @ 23:59)  HR: 88 (04-04-24 @ 04:00) (82 - 92)  BP: 120/72 (04-04-24 @ 04:00) (120/72 - 149/69)  RR: 18 (04-04-24 @ 04:00) (17 - 18)  SpO2: 99% (04-04-24 @ 04:00) (95% - 100%)  I/O's:    04-03-24 @ 07:01  -  04-04-24 @ 07:00  --------------------------------------------------------  IN: 540 mL / OUT: 1525 mL / NET: -985 mL      Physical Exam:  CONSTITUTIONAL: no apparent distress.  SKIN: No rashes or ecchymosis.  EYES: PERRLA. EOMI. No scleral icterus.  ENT: Supple. No discharge or glossitis. Oral mucosa with moist membranes. Hearing intact bilaterally.  CV: RRR. Normal S1 and S2. No murmurs, rubs, or gallops. No edema. Pulses equal bilaterally.  PULM: Clear to auscultation throughout. Respirations unlabored. No wheezing, rales, or rhonchi.  GI: Soft, non-tender, non-distended. No palpable masses.  MSK: (+) L foot wrapped. No cyanosis or clubbing.  NEURO: CN grossly normal  PSYCH: A+O, appropriately communicating and responding to questions    Labs:                        11.4   11.47 )-----------( 337      ( 04 Apr 2024 07:17 )             33.1     04-03    135  |  99  |  29<H>  ----------------------------<  242<H>  4.1   |  21<L>  |  1.12    Ca    9.0      03 Apr 2024 07:17    TPro  7.5  /  Alb  3.6  /  TBili  0.7  /  DBili  x   /  AST  9<L>  /  ALT  9<L>  /  AlkPhos  90  04-02    Radiology/Procedures: Reviewed.  VA Physiol Extremity Lower 3+ Level, BI 04.03.24  IMPRESSION: Moderate arterial flow limitation in the left lower extremity localizing to the iliofemoral distribution. Consider lower extremity arterial duplex study for further evaluation.    MR Foot w/wo IV Cont, Left 04.02.24  IMPRESSION:  Findings suggestive of early osteomyelitis at the fifth metatarsal head and base of the proximal phalanx, deep to the soft tissue wound. Focal osteitis is also a differential consideration for the osseous findings..    Xray Chest 1 View AP/PA 04.02.24  IMPRESSION:  No radiographic evidence of acute cardiopulmonary disease. Right-sided chest wall port with tip in proper position.    Xray Foot AP + Lateral + Oblique, Left 04.02.24  IMPRESSION:  No tracking soft tissue gas collections, radiopaque foreign bodies, or gross radiographic evidence for osteomyelitis.  No fractures or dislocations.  Tarsometatarsal alignment maintained without evidence for a Lisfranc injury.  Congenitally fused 5th DIP joint. Preserved remaining visualized joint spaces and no joint margin erosions.  No lytic or blastic lesions.    TTE W or WO Ultrasound Enhancing Agent 04.03.24  CONCLUSIONS:   1. Left ventricular cavity is normal in size. Left ventricular wall thickness is normal. Left ventricular systolic function is normal with an ejection fraction of 69 % by Barraza's method of disks. There are no regional wall motion abnormalities seen.   2. Normal left ventricular diastolic function, with normal filling pressure.   3. Normal right ventricular cavity size, with normal wall thickness, and normal systolic function. Tricuspid annular plane systolic excursion (TAPSE) is 1.8 cm (normal >=1.7 cm).   4. The right atrium is normal in size.   5. Normal left and right atrial size.   6. No significant valvular disease.   7. No pericardial effusion seen.   8. Left ventricular global longitudinal strain is -21.3 % which is normal (< -18%). Images were acquired on a [employer] ultrasound system and processed using [employer] strain analysis software with a heart rate of 80 bpm and a blood pressure of 123/72 mmHg.   9. No prior echocardiogram is available for comparison.  10. There is normal LV mass andconcentric remodeling.

## 2024-04-04 NOTE — PHYSICAL THERAPY INITIAL EVALUATION ADULT - PERTINENT HX OF CURRENT PROBLEM, REHAB EVAL
59y Male active smoker with a hx of Stage IV rectal adenocarcinoma s/p colectomy with colostomy (1/2024) and T2DM c/b peripheral neuropathy who presented from PCP concerning for L foot infection, found to have L fifth metatarsal osteomyelitis.   Dx: Osteomyelitis, Rectal adenocarcinoma, Type 2 diabetes mellitus. 59y Male active smoker with a hx of Stage IV rectal adenocarcinoma s/p colectomy with colostomy (1/2024) and T2DM c/b peripheral neuropathy who presented from PCP concerning for L foot infection, found to have L fifth metatarsal osteomyelitis.  Dx: Osteomyelitis, Rectal adenocarcinoma, Type 2 diabetes mellitus.

## 2024-04-04 NOTE — PROGRESS NOTE ADULT - ASSESSMENT
Mr. Menon is a 59 year old male active smoker with PMHx of  peripheral neuropathy, Type II DM,  anemia,  status post colectomy with colostomy 01/2024  due to rectal cancer under the care of Dr. Renny Purvis now admitted with osteomyelitis of the left foot.  MRI foot shows Findings suggestive of early osteomyelitis at the fifth metatarsal head and base of the proximal phalanx, deep to the soft tissue wound. Focal osteitis is also a differential consideration for the osseous findings. Podiatry pending resection with graft. Infectious disease consulted.    Labs show hemoglobin 11.6, hematocrit  34.7, MCV 83.6 platelet count 339, WBC 12.14 with neutrophilic predominance. Creatinine 1.12 with normal liver function.  HbA1c 6.8.  TTE with EF 69%.     Patient last seen in Hematology Oncology Clinic 04/02/2024  and oxaliplatin was discontinued due to peripheral neuropathy.      # Stage IV Rectal Adenocarcinoma   - Diagnosed 08/2023  - Invasive, moderately differentiated colonic adenocarcinoma  - Low probability of MSI-high  - HER-2-Mary Negative  - KRAS is Wild Type  - Metastatic to liver  - FOLFOX and weekly Cetuximab started 9/18/23  - PET-CT from 03/20/2024 shows that the patient is status post surgery with no abnormal FDG uptake in the pelvis postsurgical area.  More prominent uptake in the gallbladder fossa (early recurrence versus normal fluctuation of uptake ) and no other FDG avid lesion in the field-of-view.  Previously noted liver Mets are not presently seen.  - Was scheduled to receive 5FU, Leucovorin and Cetuximab 04/02/2024, therapy held due to concern for L foot infection and pt sent to Podiatry     # Left Foot Osteomyelitis  - Pt pending section with Podiatry  - Antibiotics per ID and primary team     # Neutrophilia   - Due to infection  - Outpatient labs 04/02/2024 WBC 16.19 ANC 14,170  - Continue to monitor     # Normocytic anemia  - Likely multifactorial, active infection, cancer, chemotherapy, dilutional   - Outpatient labs 04/02/2024 Hb 13.7, MCV 83.5   - Continue to monitor  - Transfuse to maintain Hb > 7     # Nicotine dependence, active cigarette smoker  - Counseling provided, pt aware of possible compromised wound healing       Thank you for allowing me to participate in the care of Mr. Menon, please do not hesitate to call or text me if you have further questions or concerns.     Gato Horvath MD  [facility] [place]   Division of Hematology/Oncology  [address], [address]  [place] [address]  P: [phone]  F: [phone]    Attestation:   Total time spent on the encounter: >60 minutes   ----Including face-to-face interaction in addition to chart review, reviewing treatment plan, and managing the patient’s chronic diagnoses as listed in the assessment----     1.	I have reviewed, analyzed and interpreted the following labs: CBC, CMP, imaging:  MRI Foot and TTE impressions as above.   2.	I have reviewed notes stating pts current admission, consultants and follow ups.   3.	I have reviewed the past medical, family, and surgical history and confirmed with outpatient records    Mr. Menon is a 59 year old male active smoker with PMHx of  peripheral neuropathy, Type II DM,  anemia,  status post colectomy with colostomy 01/2024  due to rectal cancer under the care of Dr. Renny Purvis now admitted with osteomyelitis of the left foot.  MRI foot shows Findings suggestive of early osteomyelitis at the fifth metatarsal head and base of the proximal phalanx, deep to the soft tissue wound. Focal osteitis is also a differential consideration for the osseous findings. Podiatry pending resection with graft. Infectious disease consulted.    Labs show hemoglobin 11.6, hematocrit  34.7, MCV 83.6 platelet count 339, WBC 12.14 with neutrophilic predominance. Creatinine 1.12 with normal liver function.  HbA1c 6.8.  TTE with EF 69%.     Patient last seen in Hematology Oncology Clinic 04/02/2024  and oxaliplatin was discontinued due to peripheral neuropathy. Podiatry scheduled pt for left foot fifth metatarsal head resection with wound debridement and graft application 04/04/2024       # Stage IV Rectal Adenocarcinoma   - Diagnosed 08/2023  - Invasive, moderately differentiated colonic adenocarcinoma  - Low probability of MSI-high  - HER-2-Mary Negative  - KRAS is Wild Type  - Metastatic to liver  - FOLFOX and weekly Cetuximab started 9/18/23  - PET-CT from 03/20/2024 shows that the patient is status post surgery with no abnormal FDG uptake in the pelvis postsurgical area.  More prominent uptake in the gallbladder fossa (early recurrence versus normal fluctuation of uptake ) and no other FDG avid lesion in the field-of-view.  Previously noted liver Mets are not presently seen.  - Was scheduled to receive 5FU, Leucovorin and Cetuximab 04/02/2024, therapy held due to concern for L foot infection and pt sent to Podiatry   - Plan to continue therapy once able to from infection standpoint, pt is aware     # Left Foot Osteomyelitis  - Podiatry scheduled pt for left foot fifth metatarsal head resection with wound debridement and graft application 04/04/2024   - Antibiotics per ID and primary team     # Neutrophilia   - Due to infection  - Outpatient labs 04/02/2024 WBC 16.19 ANC 14,170  - Continue to monitor     # Normocytic anemia  - Likely multifactorial, active infection, cancer, chemotherapy, dilutional   - Outpatient labs 04/02/2024 Hb 13.7, MCV 83.5   - Continue to monitor  - Transfuse to maintain Hb > 7     # Nicotine dependence, active cigarette smoker  - Counseling provided, pt aware of possible compromised wound healing       Thank you for allowing me to participate in the care of Mr. Menon, please do not hesitate to call or text me if you have further questions or concerns.     Gato Horvath MD  [facility] [place]   Division of Hematology/Oncology  [facility], [address]  [place] [address]  P: [phone]  F: [phone]    Attestation:    ----Pt evaluated including face-to-face interaction in addition to chart review, reviewing treatment plan, and managing the patient’s chronic diagnoses as listed in the assessment----

## 2024-04-04 NOTE — PROGRESS NOTE ADULT - SUBJECTIVE AND OBJECTIVE BOX
Patient is a 59y old  Male who presents with a chief complaint of Sent to the ER by PCP following concern for LEFT foot infection (04 Apr 2024 07:58)       INTERVAL HPI/OVERNIGHT EVENTS:  Patient seen and evaluated at bedside.  Pt is resting comfortable in NAD. Denies N/V/F/C.    Allergies    No Known Allergies    Intolerances        Vital Signs Last 24 Hrs  T(C): 37.1 (04 Apr 2024 04:00), Max: 37.6 (03 Apr 2024 23:59)  T(F): 98.7 (04 Apr 2024 04:00), Max: 99.6 (03 Apr 2024 23:59)  HR: 88 (04 Apr 2024 04:00) (82 - 90)  BP: 120/72 (04 Apr 2024 04:00) (120/72 - 149/69)  BP(mean): --  RR: 18 (04 Apr 2024 04:00) (17 - 18)  SpO2: 99% (04 Apr 2024 04:00) (95% - 100%)    Parameters below as of 04 Apr 2024 04:00  Patient On (Oxygen Delivery Method): room air        LABS:                        11.4   11.47 )-----------( 337      ( 04 Apr 2024 07:17 )             33.1     04-04    136  |  101  |  21  ----------------------------<  126<H>  4.2   |  21<L>  |  0.95    Ca    9.1      04 Apr 2024 07:16    TPro  7.5  /  Alb  3.6  /  TBili  0.7  /  DBili  x   /  AST  9<L>  /  ALT  9<L>  /  AlkPhos  90  04-02      Urinalysis Basic - ( 04 Apr 2024 07:16 )    Color: x / Appearance: x / SG: x / pH: x  Gluc: 126 mg/dL / Ketone: x  / Bili: x / Urobili: x   Blood: x / Protein: x / Nitrite: x   Leuk Esterase: x / RBC: x / WBC x   Sq Epi: x / Non Sq Epi: x / Bacteria: x      CAPILLARY BLOOD GLUCOSE      POCT Blood Glucose.: 132 mg/dL (04 Apr 2024 08:35)  POCT Blood Glucose.: 120 mg/dL (03 Apr 2024 22:09)  POCT Blood Glucose.: 190 mg/dL (03 Apr 2024 17:25)  POCT Blood Glucose.: 199 mg/dL (03 Apr 2024 13:51)      Lower Extremity Physical Exam:  Vascular: DP/PT 2/4, B/L, CFT <3  seconds B/L, Temperature gradient warm to cool B/L.   Neuro: Epicritic sensation diminished to the level of digits, B/L.  Musculoskeletal/Ortho: unremarkable   Skin: Left foot submet 5 wound to bone, fibronecrotic wound bed, tracking 1cm circumferentially, no malodor, no drainage, erythema to midfoot slowly resolving. Right foot no open wounds, no acute signs of infection.     RADIOLOGY & ADDITIONAL TESTS:

## 2024-04-04 NOTE — PROGRESS NOTE ADULT - SUBJECTIVE AND OBJECTIVE BOX
[facility] DIVISION OF INFECTIOUS DISEASES  REGIS Khan Y. Patel, S. Shah, G. Melvin  [phone]  ([phone] - weekdays after 5pm and weekends)    Name: EDA CHAWLA  Age/Gender: 59y Male  MRN: [id number]    Interval History:  Patient seen and examined this morning.   Has mild L foot discomfort.  No fevers or other new complaints.  Notes reviewed  No concerning overnight events  Afebrile   Allergies: No Known Allergies      Objective:  Vitals:   T(F): 98.7 (04-04-24 @ 04:00), Max: 99.6 (04-03-24 @ 23:59)  HR: 88 (04-04-24 @ 04:00) (82 - 90)  BP: 120/72 (04-04-24 @ 04:00) (120/72 - 149/69)  RR: 18 (04-04-24 @ 04:00) (17 - 18)  SpO2: 99% (04-04-24 @ 04:00) (95% - 100%)  Physical Examination:  General: no acute distress  HEENT: NC/AT, anicteric, neck supple  Respiratory: no acc muscle use, breathing comfortably  Cardiovascular: S1 and S2 present  Gastrointestinal: normal appearing, nondistended  Extremities: L foot dressing   Skin: no visible rash    Laboratory Studies:  CBC:                       11.4   11.47 )-----------( 337      ( 04 Apr 2024 07:17 )             33.1     WBC Trend:  11.47 04-04-24 @ 07:17  12.14 04-03-24 @ 07:17  14.12 04-02-24 @ 17:43    CMP: 04-04    136  |  101  |  21  ----------------------------<  126<H>  4.2   |  21<L>  |  0.95    Ca    9.1      04 Apr 2024 07:16    TPro  7.5  /  Alb  3.6  /  TBili  0.7  /  DBili  x   /  AST  9<L>  /  ALT  9<L>  /  AlkPhos  90  04-02    Creatinine: 0.95 mg/dL (04-04-24 @ 07:16)  Creatinine: 1.12 mg/dL (04-03-24 @ 07:17)  Creatinine: 1.18 mg/dL (04-02-24 @ 17:43)    LIVER FUNCTIONS - ( 02 Apr 2024 17:43 )  Alb: 3.6 g/dL / Pro: 7.5 g/dL / ALK PHOS: 90 U/L / ALT: 9 U/L / AST: 9 U/L / GGT: x           Vancomycin Level, Trough: 8.5 ug/mL (04-04-24 @ 04:44)    Microbiology: reviewed   Culture - Abscess with Gram Stain (collected 04-02-24 @ 17:40)  Source: .Abscess left foot  Gram Stain (04-03-24 @ 05:56):    No polymorphonuclear leukocytes seen per low power field    Numerous Gram positive cocci in pairs seen per oil power field    Culture - Blood (collected 04-02-24 @ 17:24)  Source: .Blood Blood-Peripheral  Preliminary Report (04-03-24 @ 23:02):    No growth at 24 hours    Culture - Blood (collected 04-02-24 @ 17:17)  Source: .Blood Blood-Peripheral  Preliminary Report (04-03-24 @ 23:02):    No growth at 24 hours    Radiology: reviewed   < from: [facility] Physiol Extremity Lower 3+ Level, BI (04.03.24 @ 13:11) >  IMPRESSION: Moderate arterial flow limitation in the left lower extremity   localizing to the iliofemoral distribution. Consider lower extremity   arterial duplex study for further evaluation.    < end of copied text >  < from: MR Foot w/wo IV Cont, Left (04.02.24 @ 23:00) >  IMPRESSION:  Findings suggestive of early osteomyelitis at the fifth metatarsal head   and base of the proximal phalanx, deep to the soft tissue wound. Focal   osteitis is also a differential consideration for the osseous findings..    < end of copied text >    Medications:  acetaminophen     Tablet .. 650 milliGRAM(s) Oral every 6 hours PRN  albuterol    90 MICROgram(s) HFA Inhaler 2 Puff(s) Inhalation every 6 hours PRN  cefepime   IVPB 2000 milliGRAM(s) IV Intermittent every 8 hours  chlorhexidine 2% Cloths 1 Application(s) Topical <User Schedule>  dextrose 5%. 1000 milliLiter(s) IV Continuous <Continuous>  dextrose 5%. 1000 milliLiter(s) IV Continuous <Continuous>  dextrose 50% Injectable 25 Gram(s) IV Push once  dextrose 50% Injectable 12.5 Gram(s) IV Push once  dextrose 50% Injectable 25 Gram(s) IV Push once  dextrose Oral Gel 15 Gram(s) Oral once PRN  enoxaparin Injectable 40 milliGRAM(s) SubCutaneous every 24 hours  glucagon  Injectable 1 milliGRAM(s) IntraMuscular once  influenza   Vaccine 0.5 milliLiter(s) IntraMuscular once  insulin lispro (ADMELOG) corrective regimen sliding scale   SubCutaneous three times a day before meals  insulin lispro (ADMELOG) corrective regimen sliding scale   SubCutaneous at bedtime  vancomycin  IVPB 1000 milliGRAM(s) IV Intermittent every 12 hours    Current Antimicrobials:  cefepime   IVPB 2000 milliGRAM(s) IV Intermittent every 8 hours  vancomycin  IVPB 1000 milliGRAM(s) IV Intermittent every 12 hours    Prior/Completed Antimicrobials:  cefepime   IVPB  vancomycin  IVPB.

## 2024-04-04 NOTE — PROGRESS NOTE ADULT - ASSESSMENT
Patient is a 59 year old male with PMH of stage IV colon cancer, s/p colectomy with ostomy 1/2024, peripheral neuropathy sent in from his podiatrist office with concern for osteomyelitis of the left foot.     L foot submet 5th wound with suspected early OM  Leukocytosis likely due to above   - noted L foot submet 5 wound to bone, fibronecrotic wound bed, tracking 1cm circumferentially malodorous, serous drainage, erythema to midfoot. S/p wound explored and 1cm incision proximal to wound down to level of subq, mild serous drainage expressed   - MRI L foot suggestive of early OM at 5th metatarsal head and base of proximal phalanx, deep to soft tissue wound   - afebrile, WBC noted, elevated ESR/CRP (99/140)    Recommendations:   Podiatry following - plan L foot 5th met head resection with graft application pending vascular Recommendations:   Follow L foot wound culture -- gram stain noted   Follow blood cultures - NGTD x2   MRSA PCR screen negative, discontinued vancomycin   Continue cefepime for now   Continue local wound care    Monitor temps/WBC      Cherelle Horvath M.D.  [facility], Division of Infectious Diseases  [phone]  After 5pm on weekdays and all day on weekends - please call [phone]

## 2024-04-04 NOTE — CONSULT NOTE ADULT - ASSESSMENT
To be discussed with vascular surgery fellow on call.    Vascular Surgery  Please page [phone] for all questions.       Recommendations:  -Pending.    To be discussed with vascular surgery fellow on behalf of Dr. Rodriguez.    Vascular Surgery  Please page [phone] for all questions.   59M active smoker hx stage IV colon cancer, s/p colectomy with ostomy 1/2024, peripheral neuropathy 2/2 neoadjuvant chemo, sent in from his podiatrist's office with concern for osteomyelitis of the left foot. Patient was previously unaware of any infection, but noticed a wound on his left foot before visiting his oncologist on Tuesday, when he had been due to start adjuvant chemotherapy. His oncologist sent him to the ED for evaluation. Patient denies any hx of foot wounds and has never seen a vascular surgeon. He ambulates without difficulty and denies claudication or rest pain. States he did not have any foot wounds one-three weeks ago. Patient with xray negative for left toe OM, however MRI sf left foot 5th met head OM, 5th proximal phalanx base OM. Podiatry planning left foot 5th met head resection with graft application vs LWC, pending vascular surgery recommendations.    Recommendations:  -Pending.    To be discussed with vascular surgery fellow on behalf of Dr. Rodriguez.    Vascular Surgery  Please page [phone] for all questions.   59M active smoker hx stage IV colon cancer, s/p colectomy with ostomy 1/2024, peripheral neuropathy 2/2 neoadjuvant chemo, sent in from his podiatrist's office with concern for osteomyelitis of the left foot. Patient was previously unaware of any infection, but noticed a wound on his left foot before visiting his oncologist on Tuesday, when he had been due to start adjuvant chemotherapy. His oncologist sent him to the ED for evaluation. Patient denies any hx of foot wounds and has never seen a vascular surgeon. He ambulates without difficulty and denies claudication or rest pain. States he did not have any foot wounds one-three weeks ago. Patient with xray negative for left toe OM, however MRI sf left foot 5th met head OM, 5th proximal phalanx base OM. S/p incision and drainage of wound in ED by podiatry, now planning for left foot 5th metatarsal head resection with graft application vs LWC, pending vascular surgery recommendations.    Recommendations:  -Pending.    To be discussed with vascular surgery fellow on behalf of Dr. Rodriguez.    Vascular Surgery  Please page [phone] for all questions.   59M active smoker (48-pack-year history) hx stage IV colon cancer, s/p colectomy with ostomy 1/2024, peripheral neuropathy 2/2 neoadjuvant chemo, sent in from his podiatrist's office with concern for osteomyelitis of the left foot. Patient was previously unaware of any infection, but noticed a wound on his left foot before visiting his oncologist on Tuesday, when he had been due to start adjuvant chemotherapy. His oncologist sent him to the ED for evaluation. Patient denies any hx of foot wounds and has never seen a vascular surgeon. He ambulates without difficulty and denies claudication or rest pain. States he did not have any foot wounds one-three weeks ago. Patient with xray negative for left toe OM, however MRI sf left foot 5th met head OM, 5th proximal phalanx base OM. S/p incision and drainage of wound in ED by podiatry, now planning for left foot 5th metatarsal head resection with graft application vs LWC, pending vascular surgery recommendations. DAMIEN PVR 0.65 on left, left toe 0.30, cf moderate arterial flow limitation in the left lower extremity localizing to the iliofemoral distribution.    Recommendations:  -Pending.    To be discussed with vascular surgery fellow on behalf of Dr. Rodriguez.    Vascular Surgery  Please page [phone] for all questions.   59M active smoker (48-pack-year history) hx stage IV colon cancer, s/p colectomy with ostomy 1/2024, peripheral neuropathy 2/2 neoadjuvant chemo, sent in from his podiatrist's office with concern for osteomyelitis of the left foot. Patient was previously unaware of any infection, but noticed a wound on his left foot before visiting his oncologist on Tuesday, when he had been due to start adjuvant chemotherapy. His oncologist sent him to the ED for evaluation. Patient denies any hx of foot wounds and has never seen a vascular surgeon. He ambulates without difficulty and denies claudication or rest pain. States he did not have any foot wounds one-three weeks ago. Patient with xray negative for left toe OM, however MRI sf left foot 5th met head OM, 5th proximal phalanx base OM. S/p incision and drainage of wound in ED by podiatry, now planning for left foot 5th metatarsal head resection with graft application vs LWC, pending vascular surgery recommendations. DAMIEN PVR 0.65 on left, left toe 0.30, cf moderate arterial flow limitation in the left lower extremity localizing to the iliofemoral distribution.    Recommendations:  -Not cleared for podiatric intervention.   -Will likely require LLE angiogram, possible angioplasty, possible stent.  -Vascular Surgery will continue to follow    Case discussed with Vascular Surgery fellow on behalf of Dr. Rodriguez.    Vascular Surgery  Please page [phone] for all questions.   59M active smoker (48-pack-year history) hx stage IV colon cancer, s/p colectomy with ostomy 1/2024, peripheral neuropathy 2/2 neoadjuvant chemo, sent in from his podiatrist's office with concern for osteomyelitis of the left foot. Patient was previously unaware of any infection, but noticed a wound on his left foot before visiting his oncologist on Tuesday, when he had been due to start adjuvant chemotherapy. His oncologist sent him to the ED for evaluation. Patient denies any hx of foot wounds and has never seen a vascular surgeon. He ambulates without difficulty and denies claudication or rest pain. States he did not have any foot wounds one-three weeks ago. Patient with xray negative for left toe OM, however MRI sf left foot 5th met head OM, 5th proximal phalanx base OM. S/p incision and drainage of wound in ED by podiatry, now planning for left foot 5th metatarsal head resection with graft application vs LWC, pending vascular surgery recommendations. DAMIEN PVR 0.65 on left, left toe 0.30, cf moderate arterial flow limitation in the left lower extremity localizing to the iliofemoral distribution.    Recommendations:  -Not cleared for podiatric intervention.   -Will likely require LLE angiogram, possible angioplasty, possible stent.  -Vascular Surgery will continue to follow  -Please document medical and cardiac clearance prior to procedure/ LLE angiogram    Case discussed with Vascular Surgery fellow on behalf of Dr. Rodriguez.    Vascular Surgery  Please page [phone] for all questions.   59M active smoker (48-pack-year history) hx stage IV colon cancer, s/p colectomy with ostomy 1/2024, peripheral neuropathy 2/2 neoadjuvant chemo, sent in from his podiatrist's office with concern for osteomyelitis of the left foot. Patient was previously unaware of any infection, but noticed a wound on his left foot before visiting his oncologist on Tuesday, when he had been due to start adjuvant chemotherapy. His oncologist sent him to the ED for evaluation. Patient denies any hx of foot wounds and has never seen a vascular surgeon. He ambulates without difficulty and denies claudication or rest pain. States he did not have any foot wounds one-three weeks ago. Patient with xray negative for left toe OM, however MRI sf left foot 5th met head OM, 5th proximal phalanx base OM. S/p incision and drainage of wound in ED by podiatry, now planning for left foot 5th metatarsal head resection with graft application vs LWC, pending vascular surgery recommendations. DAMIEN PVR 0.65 on left, left toe 0.30, cf moderate arterial flow limitation in the left lower extremity localizing to the iliofemoral distribution.    Recommendations:  -Not cleared for podiatric intervention.   -Will likely require LLE angiogram, possible angioplasty, possible stent.  -Please document medical and cardiac clearance for potential LLE angiogram.  -Vascular surgery will follow.    Case discussed with vascular surgery fellow on behalf of Dr. Rodriguez.    Vascular Surgery  Please page [phone] for all questions.

## 2024-04-04 NOTE — PHYSICAL THERAPY INITIAL EVALUATION ADULT - DIAGNOSIS, PT EVAL
UPDATE:  Office visit pain clinic in [facility] with all required elements of H&P dated 01/05/2023  Patient seen preop, chart reviewed,   No changes in medical history and health assessment since last evaluation. PE:  AAO x 3, in NAD, VSS,. Resp: breathing on RA, no respiratory distress  Cardiac: rate normal    Risk / Benefits explained to patient, patient agree to proceed with plan.   ASA 3  MP 3 Pt does not require skilled PT services at this time, however, pt is scheduled for surgery 4/4, PT to follow up after surgery

## 2024-04-04 NOTE — PROGRESS NOTE ADULT - ASSESSMENT
59M with left foot submet 5 wound to bone and cellulitis.   - Pt was seen and evaluated.   - Afebrile, WBC 11.47.  - Left foot submet 5 wound to bone, fibronecrotic wound bed, tracking 1cm circumferentially, no malodor, no drainage, erythema to midfoot slowly resolving.  - Left Foot X-Ray: No gas, no OM.  - Left Foot Wound Culture: Pending.  - DAMIEN/PVR: RABI 1.1, LABI 0.65, RTBI 0.97, LTBI 0.3.  - Recommend vascular consult.  - Left Foot MRI: 5th met head OM, 5th proximal phalanx base OM.  - Pod Plan: Local wound care vs left foot 5th met head resection with graft application pending [facility] recs.  - Please document medical clearance for podiatric procedure under anesthesia.  - Documented cardiac clearance for podiatric surgery under anesthesia, appreciated.   - Discussed with attending.

## 2024-04-04 NOTE — PROGRESS NOTE ADULT - SUBJECTIVE AND OBJECTIVE BOX
\[facility]\"" HEMATOLOGY/ONCOLOGY INPATIENT PROGRESS NOTE     Interval Hx:   04-04-24: Mr. Menon was seen at bedside today.    Meds:   MEDICATIONS  (STANDING):  cefepime   IVPB 2000 milliGRAM(s) IV Intermittent every 8 hours  chlorhexidine 2% Cloths 1 Application(s) Topical <User Schedule>  dextrose 5%. 1000 milliLiter(s) (100 mL/Hr) IV Continuous <Continuous>  dextrose 5%. 1000 milliLiter(s) (50 mL/Hr) IV Continuous <Continuous>  dextrose 50% Injectable 25 Gram(s) IV Push once  dextrose 50% Injectable 25 Gram(s) IV Push once  dextrose 50% Injectable 12.5 Gram(s) IV Push once  enoxaparin Injectable 40 milliGRAM(s) SubCutaneous every 24 hours  glucagon  Injectable 1 milliGRAM(s) IntraMuscular once  influenza   Vaccine 0.5 milliLiter(s) IntraMuscular once  insulin lispro (ADMELOG) corrective regimen sliding scale   SubCutaneous three times a day before meals  insulin lispro (ADMELOG) corrective regimen sliding scale   SubCutaneous at bedtime  vancomycin  IVPB 1000 milliGRAM(s) IV Intermittent every 12 hours    MEDICATIONS  (PRN):  acetaminophen     Tablet .. 650 milliGRAM(s) Oral every 6 hours PRN Temp greater or equal to 38C (100.4F), Mild Pain (1 - 3)  albuterol    90 MICROgram(s) HFA Inhaler 2 Puff(s) Inhalation every 6 hours PRN Bronchospasm  dextrose Oral Gel 15 Gram(s) Oral once PRN Blood Glucose LESS THAN 70 milliGRAM(s)/deciliter    Vital Signs Last 24 Hrs  T(C): 37.1 (04 Apr 2024 04:00), Max: 37.6 (03 Apr 2024 23:59)  T(F): 98.7 (04 Apr 2024 04:00), Max: 99.6 (03 Apr 2024 23:59)  HR: 88 (04 Apr 2024 04:00) (82 - 92)  BP: 120/72 (04 Apr 2024 04:00) (120/72 - 149/69)  BP(mean): --  RR: 18 (04 Apr 2024 04:00) (17 - 18)  SpO2: 99% (04 Apr 2024 04:00) (95% - 100%)    Parameters below as of 04 Apr 2024 04:00  Patient On (Oxygen Delivery Method): room air    Physical Exam:  Gen: NAD  HEENT: EOMI, MMM  Chest: equal chest rise, speaking full sentences   Cardiac: RR  Abd: Nondistended  Ext: LLE cellulitis  Neuro: AAOx3, normal mood and affect     Labs:                        11.6   12.14 )-----------( 339      ( 03 Apr 2024 07:17 )             34.7     CBC Full  -  ( 03 Apr 2024 07:17 )  WBC Count : 12.14 K/uL  RBC Count : 4.15 M/uL  Hemoglobin : 11.6 g/dL  Hematocrit : 34.7 %  Platelet Count - Automated : 339 K/uL  Mean Cell Volume : 83.6 fl  Mean Cell Hemoglobin : 28.0 pg  Mean Cell Hemoglobin Concentration : 33.4 gm/dL  Auto Neutrophil # : 9.42 K/uL  Auto Lymphocyte # : 1.60 K/uL  Auto Monocyte # : 0.88 K/uL  Auto Eosinophil # : 0.15 K/uL  Auto Basophil # : 0.05 K/uL  Auto Neutrophil % : 77.7 %  Auto Lymphocyte % : 13.2 %  Auto Monocyte % : 7.2 %  Auto Eosinophil % : 1.2 %  Auto Basophil % : 0.4 %    04-03    135  |  99  |  29<H>  ----------------------------<  242<H>  4.1   |  21<L>  |  1.12    Ca    9.0      03 Apr 2024 07:17    TPro  7.5  /  Alb  3.6  /  TBili  0.7  /  DBili  x   /  AST  9<L>  /  ALT  9<L>  /  AlkPhos  90  04-02       [facility] HEMATOLOGY/ONCOLOGY INPATIENT PROGRESS NOTE     Interval Hx:   04-04-24: Mr. Menon was seen at bedside today, awake and alert, no overnight events noted, Podiatry scheduled pt for left foot fifth metatarsal head resection with wound debridement and graft application later this afternoon, pt aware     Meds:   MEDICATIONS  (STANDING):  cefepime   IVPB 2000 milliGRAM(s) IV Intermittent every 8 hours  chlorhexidine 2% Cloths 1 Application(s) Topical <User Schedule>  dextrose 5%. 1000 milliLiter(s) (100 mL/Hr) IV Continuous <Continuous>  dextrose 5%. 1000 milliLiter(s) (50 mL/Hr) IV Continuous <Continuous>  dextrose 50% Injectable 25 Gram(s) IV Push once  dextrose 50% Injectable 25 Gram(s) IV Push once  dextrose 50% Injectable 12.5 Gram(s) IV Push once  enoxaparin Injectable 40 milliGRAM(s) SubCutaneous every 24 hours  glucagon  Injectable 1 milliGRAM(s) IntraMuscular once  influenza   Vaccine 0.5 milliLiter(s) IntraMuscular once  insulin lispro (ADMELOG) corrective regimen sliding scale   SubCutaneous three times a day before meals  insulin lispro (ADMELOG) corrective regimen sliding scale   SubCutaneous at bedtime  vancomycin  IVPB 1000 milliGRAM(s) IV Intermittent every 12 hours    MEDICATIONS  (PRN):  acetaminophen     Tablet .. 650 milliGRAM(s) Oral every 6 hours PRN Temp greater or equal to 38C (100.4F), Mild Pain (1 - 3)  albuterol    90 MICROgram(s) HFA Inhaler 2 Puff(s) Inhalation every 6 hours PRN Bronchospasm  dextrose Oral Gel 15 Gram(s) Oral once PRN Blood Glucose LESS THAN 70 milliGRAM(s)/deciliter    Vital Signs Last 24 Hrs  T(C): 37.1 (04 Apr 2024 04:00), Max: 37.6 (03 Apr 2024 23:59)  T(F): 98.7 (04 Apr 2024 04:00), Max: 99.6 (03 Apr 2024 23:59)  HR: 88 (04 Apr 2024 04:00) (82 - 92)  BP: 120/72 (04 Apr 2024 04:00) (120/72 - 149/69)  BP(mean): --  RR: 18 (04 Apr 2024 04:00) (17 - 18)  SpO2: 99% (04 Apr 2024 04:00) (95% - 100%)    Parameters below as of 04 Apr 2024 04:00  Patient On (Oxygen Delivery Method): room air    Physical Exam:  Gen: NAD  HEENT: EOMI, MMM  Chest: equal chest rise, speaking full sentences   Cardiac: RR  Abd: Nondistended  Ext: LLE/foot cellulitis, bandaged with betadine type solution   Neuro: AAOx3, normal mood and affect     Labs:                        11.6   12.14 )-----------( 339      ( 03 Apr 2024 07:17 )             34.7     CBC Full  -  ( 03 Apr 2024 07:17 )  WBC Count : 12.14 K/uL  RBC Count : 4.15 M/uL  Hemoglobin : 11.6 g/dL  Hematocrit : 34.7 %  Platelet Count - Automated : 339 K/uL  Mean Cell Volume : 83.6 fl  Mean Cell Hemoglobin : 28.0 pg  Mean Cell Hemoglobin Concentration : 33.4 gm/dL  Auto Neutrophil # : 9.42 K/uL  Auto Lymphocyte # : 1.60 K/uL  Auto Monocyte # : 0.88 K/uL  Auto Eosinophil # : 0.15 K/uL  Auto Basophil # : 0.05 K/uL  Auto Neutrophil % : 77.7 %  Auto Lymphocyte % : 13.2 %  Auto Monocyte % : 7.2 %  Auto Eosinophil % : 1.2 %  Auto Basophil % : 0.4 %    04-03    135  |  99  |  29<H>  ----------------------------<  242<H>  4.1   |  21<L>  |  1.12    Ca    9.0      03 Apr 2024 07:17    TPro  7.5  /  Alb  3.6  /  TBili  0.7  /  DBili  x   /  AST  9<L>  /  ALT  9<L>  /  AlkPhos  90  04-02

## 2024-04-04 NOTE — PROGRESS NOTE ADULT - SUBJECTIVE AND OBJECTIVE BOX
DATE OF SERVICE: 04-04-24 @ 14:25    Patient is a 59y old  Male who presents with a chief complaint of Sent to the ER by PCP following concern for LEFT foot infection (04 Apr 2024 10:49)      INTERVAL HISTORY: Feels ok.     REVIEW OF SYSTEMS:  CONSTITUTIONAL: No weakness  EYES/ENT: No visual changes;  No throat pain   NECK: No pain or stiffness  RESPIRATORY: No cough, wheezing; No shortness of breath  CARDIOVASCULAR: No chest pain or palpitations  GASTROINTESTINAL: No abdominal  pain. No nausea, vomiting, or hematemesis  GENITOURINARY: No dysuria, frequency or hematuria  NEUROLOGICAL: No stroke like symptoms  SKIN: No rashes    	  MEDICATIONS:        PHYSICAL EXAM:  T(C): 36.7 (04-04-24 @ 11:45), Max: 37.6 (04-03-24 @ 23:59)  HR: 68 (04-04-24 @ 11:45) (68 - 90)  BP: 132/69 (04-04-24 @ 11:45) (120/72 - 149/69)  RR: 18 (04-04-24 @ 11:45) (17 - 18)  SpO2: 97% (04-04-24 @ 11:45) (95% - 99%)  Wt(kg): --  I&O's Summary    03 Apr 2024 07:01  -  04 Apr 2024 07:00  --------------------------------------------------------  IN: 540 mL / OUT: 1525 mL / NET: -985 mL          Appearance: In no distress	  HEENT:    PERRL, EOMI	  Cardiovascular:  S1 S2, No JVD  Respiratory: Lungs clear to auscultation	  Gastrointestinal:  Soft, Non-tender, + BS	  Vascularature:  No edema of LE  Psychiatric: Appropriate affect   Neuro: no acute focal deficits                               11.4   11.47 )-----------( 337      ( 04 Apr 2024 07:17 )             33.1     04-04    136  |  101  |  21  ----------------------------<  126<H>  4.2   |  21<L>  |  0.95    Ca    9.1      04 Apr 2024 07:16    TPro  7.5  /  Alb  3.6  /  TBili  0.7  /  DBili  x   /  AST  9<L>  /  ALT  9<L>  /  AlkPhos  90  04-02        Labs personally reviewed      ASSESSMENT/PLAN: 	    This is a 59 year old male PMH: Colon ca, Tobacco dependance.  Admitted for left foot pain. + Ulcer      1. Preop Risk Stratification - TTE unremarkable with preserved EF  - EKG non ischemic   - Reports good functional capacity prior to diabetic foot ulcer  - Elevated risk for mod risk pods surgery, no contraindication to proceed    2.   Diabetic foot ulcer  - MRI foot  suggestive of early osteomyelitis   - C/w local wound care per Pod  - Abx as per primary team  - POD following-> Plan for left foot 5th met head resection    3.  Dm2:  - HgbA1c 6.8    4 DVT ppx:  - Lovenox sq        Isabel Deleon, AG-NP   Nuno Yeager DO [facility]  Cardiovascular Medicine  [address] [place] [address], [address]  Available through call or text on [employer] TE[employer]  Office: [phone]   DATE OF SERVICE: 04-04-24 @ 14:25    Patient is a 59y old  Male who presents with a chief complaint of Sent to the ER by PCP following concern for LEFT foot infection (04 Apr 2024 10:49)      INTERVAL HISTORY: Feels ok.     REVIEW OF SYSTEMS:  CONSTITUTIONAL: No weakness  EYES/ENT: No visual changes;  No throat pain   NECK: No pain or stiffness  RESPIRATORY: No cough, wheezing; No shortness of breath  CARDIOVASCULAR: No chest pain or palpitations  GASTROINTESTINAL: No abdominal  pain. No nausea, vomiting, or hematemesis  GENITOURINARY: No dysuria, frequency or hematuria  NEUROLOGICAL: No stroke like symptoms  SKIN: No rashes    	  MEDICATIONS:        PHYSICAL EXAM:  T(C): 36.7 (04-04-24 @ 11:45), Max: 37.6 (04-03-24 @ 23:59)  HR: 68 (04-04-24 @ 11:45) (68 - 90)  BP: 132/69 (04-04-24 @ 11:45) (120/72 - 149/69)  RR: 18 (04-04-24 @ 11:45) (17 - 18)  SpO2: 97% (04-04-24 @ 11:45) (95% - 99%)  Wt(kg): --  I&O's Summary    03 Apr 2024 07:01  -  04 Apr 2024 07:00  --------------------------------------------------------  IN: 540 mL / OUT: 1525 mL / NET: -985 mL          Appearance: In no distress	  HEENT:    PERRL, EOMI	  Cardiovascular:  S1 S2, No JVD  Respiratory: Lungs clear to auscultation	  Gastrointestinal:  Soft, Non-tender, + BS	  Vascularature:  No edema of LE  Psychiatric: Appropriate affect   Neuro: no acute focal deficits                               11.4   11.47 )-----------( 337      ( 04 Apr 2024 07:17 )             33.1     04-04    136  |  101  |  21  ----------------------------<  126<H>  4.2   |  21<L>  |  0.95    Ca    9.1      04 Apr 2024 07:16    TPro  7.5  /  Alb  3.6  /  TBili  0.7  /  DBili  x   /  AST  9<L>  /  ALT  9<L>  /  AlkPhos  90  04-02        Labs personally reviewed      ASSESSMENT/PLAN: 	    This is a 59 year old male PMH: Colon ca, Tobacco dependance.  Admitted for left foot pain. + Ulcer      1. Preop Risk Stratification - TTE unremarkable with preserved EF  - EKG non ischemic   - Reports good functional capacity prior to diabetic foot ulcer  - Elevated risk for mod risk LE angio and pods surgery, no contraindication to proceed    2.   Diabetic foot ulcer  - MRI foot  suggestive of early osteomyelitis   - C/w local wound care per Pod  - Abx as per primary team  - POD following-> Plan for left foot 5th met head resection    3.  Dm2:  - HgbA1c 6.8    4 DVT ppx:  - Lovenox sq        Isabel Deleon, AG-NP   Nuno Yeager DO [facility]  Cardiovascular Medicine  [address] [place] [address], [address]  Available through call or text on [employer] TEAM[employer]  Office: [phone]

## 2024-04-04 NOTE — PROGRESS NOTE ADULT - PROBLEM SELECTOR PLAN 1
In setting of diabetic foot wound. MRI demonstrating early osteomyelitis of L foot 5th metatarsal head and base of proximal phalanx.  - DAMIEN demonstrating BLE PAD  - BCx: NGTD  - Wound Cx: gram positive cocci  - Podiatry following: OR for L foot 5th metatarsal head resection with graft application pending vascular  - Consult vascular surgery given LLE PAD  - ID following: continue cefepime

## 2024-04-04 NOTE — CONSULT NOTE ADULT - SUBJECTIVE AND OBJECTIVE BOX
General Surgery Consult  Consulting surgical team: vascular  Consulting attending: Dr. Rodriguez    HPI:  59M hx stage IV colon cancer, s/p colectomy with ostomy 1/2024, peripheral neuropathy sent in from his podiatrist's office with concern for osteomyelitis of the left foot. Patient was unaware of any infection but noticed some drainage between his second and third toes the morning of presentation, prompting him to see his podiatrist, who was reportedly concerned for osteo sent him in for admission and IV antibiotics. Patient with xray negative for left toe OM, however MRI sf left foot 5th met head OM, 5th proximal phalanx base OM. Podiatry planning left foot 5th met head resection with graft application vs LWC, pending vascular surgery recommendations.        PAST MEDICAL HISTORY:  Colon cancer  COVID-19 vaccination refused  Tobacco dependence    PAST SURGICAL HISTORY:  S/P colectomy  History of creation of ostomy      MEDICATIONS:  acetaminophen     Tablet .. 650 milliGRAM(s) Oral every 6 hours PRN  albuterol    90 MICROgram(s) HFA Inhaler 2 Puff(s) Inhalation every 6 hours PRN  cefepime   IVPB 2000 milliGRAM(s) IV Intermittent every 8 hours  chlorhexidine 2% Cloths 1 Application(s) Topical <User Schedule>  dextrose 5%. 1000 milliLiter(s) IV Continuous <Continuous>  dextrose 5%. 1000 milliLiter(s) IV Continuous <Continuous>  dextrose 50% Injectable 25 Gram(s) IV Push once  dextrose 50% Injectable 25 Gram(s) IV Push once  dextrose 50% Injectable 12.5 Gram(s) IV Push once  dextrose Oral Gel 15 Gram(s) Oral once PRN  enoxaparin Injectable 40 milliGRAM(s) SubCutaneous every 24 hours  glucagon  Injectable 1 milliGRAM(s) IntraMuscular once  influenza   Vaccine 0.5 milliLiter(s) IntraMuscular once  insulin lispro (ADMELOG) corrective regimen sliding scale   SubCutaneous three times a day before meals  insulin lispro (ADMELOG) corrective regimen sliding scale   SubCutaneous at bedtime      ALLERGIES:  No Known Allergies      VITALS & I/Os:  Vital Signs Last 24 Hrs  T(C): 36.7 (04 Apr 2024 11:45), Max: 37.6 (03 Apr 2024 23:59)  T(F): 98.1 (04 Apr 2024 11:45), Max: 99.6 (03 Apr 2024 23:59)  HR: 68 (04 Apr 2024 11:45) (68 - 90)  BP: 132/69 (04 Apr 2024 11:45) (120/72 - 149/69)  BP(mean): --  RR: 18 (04 Apr 2024 11:45) (17 - 18)  SpO2: 97% (04 Apr 2024 11:45) (95% - 99%)    Parameters below as of 04 Apr 2024 11:45  Patient On (Oxygen Delivery Method): room air        I&O's Summary    03 Apr 2024 07:01  -  04 Apr 2024 07:00  --------------------------------------------------------  IN: 540 mL / OUT: 1525 mL / NET: -985 mL        PHYSICAL EXAM:  pending     LABS:                        11.4   11.47 )-----------( 337      ( 04 Apr 2024 07:17 )             33.1     04-04    136  |  101  |  21  ----------------------------<  126<H>  4.2   |  21<L>  |  0.95    Ca    9.1      04 Apr 2024 07:16    TPro  7.5  /  Alb  3.6  /  TBili  0.7  /  DBili  x   /  AST  9<L>  /  ALT  9<L>  /  AlkPhos  90  04-02    Lactate:              Urinalysis Basic - ( 04 Apr 2024 07:16 )    Color: x / Appearance: x / SG: x / pH: x  Gluc: 126 mg/dL / Ketone: x  / Bili: x / Urobili: x   Blood: x / Protein: x / Nitrite: x   Leuk Esterase: x / RBC: x / WBC x   Sq Epi: x / Non Sq Epi: x / Bacteria: x        IMAGING:                                                                                               General Surgery Consult  Consulting surgical team: vascular  Consulting attending: Dr. Rodriguez    HPI:  59M hx stage IV colon cancer, s/p colectomy with ostomy 1/2024, peripheral neuropathy sent in from his podiatrist's office with concern for osteomyelitis of the left foot. Patient was unaware of any infection but noticed some drainage between his second and third toes the morning of presentation, prompting him to see his podiatrist, who was reportedly concerned for osteo sent him in for admission and IV antibiotics. Patient with xray negative for left toe OM, however MRI sf left foot 5th met head OM, 5th proximal phalanx base OM. Podiatry planning left foot 5th met head resection with graft application vs LWC, pending vascular surgery recommendations.        PAST MEDICAL HISTORY:  Colon cancer  COVID-19 vaccination refused  Tobacco dependence    PAST SURGICAL HISTORY:  S/P colectomy  History of creation of ostomy      MEDICATIONS:  acetaminophen     Tablet .. 650 milliGRAM(s) Oral every 6 hours PRN  albuterol    90 MICROgram(s) HFA Inhaler 2 Puff(s) Inhalation every 6 hours PRN  cefepime   IVPB 2000 milliGRAM(s) IV Intermittent every 8 hours  chlorhexidine 2% Cloths 1 Application(s) Topical <User Schedule>  dextrose 5%. 1000 milliLiter(s) IV Continuous <Continuous>  dextrose 5%. 1000 milliLiter(s) IV Continuous <Continuous>  dextrose 50% Injectable 25 Gram(s) IV Push once  dextrose 50% Injectable 25 Gram(s) IV Push once  dextrose 50% Injectable 12.5 Gram(s) IV Push once  dextrose Oral Gel 15 Gram(s) Oral once PRN  enoxaparin Injectable 40 milliGRAM(s) SubCutaneous every 24 hours  glucagon  Injectable 1 milliGRAM(s) IntraMuscular once  influenza   Vaccine 0.5 milliLiter(s) IntraMuscular once  insulin lispro (ADMELOG) corrective regimen sliding scale   SubCutaneous three times a day before meals  insulin lispro (ADMELOG) corrective regimen sliding scale   SubCutaneous at bedtime      ALLERGIES:  No Known Allergies      VITALS & I/Os:  Vital Signs Last 24 Hrs  T(C): 36.7 (04 Apr 2024 11:45), Max: 37.6 (03 Apr 2024 23:59)  T(F): 98.1 (04 Apr 2024 11:45), Max: 99.6 (03 Apr 2024 23:59)  HR: 68 (04 Apr 2024 11:45) (68 - 90)  BP: 132/69 (04 Apr 2024 11:45) (120/72 - 149/69)  BP(mean): --  RR: 18 (04 Apr 2024 11:45) (17 - 18)  SpO2: 97% (04 Apr 2024 11:45) (95% - 99%)    Parameters below as of 04 Apr 2024 11:45  Patient On (Oxygen Delivery Method): room air        I&O's Summary    03 Apr 2024 07:01  -  04 Apr 2024 07:00  --------------------------------------------------------  IN: 540 mL / OUT: 1525 mL / NET: -985 mL        PHYSICAL EXAM:  General: not acutely distressed  Neurological: AO X4  Respiratory: nonlabored respirations  Cardiac: pulse present  Abdominal: soft, nontender, nondistended, no rebound, no guarding, no palpable mass. Well-healed colectomy scars. Ostomy with stool and gas  Extremities: warm, motor function and sensation intact. Left fifth toe with dry, necrotic wound at base of toe, nontender, without drainage  Vascular: right DP/PT palpable, left DP/PT dopplerable       LABS:                        11.4   11.47 )-----------( 337      ( 04 Apr 2024 07:17 )             33.1     04-04    136  |  101  |  21  ----------------------------<  126<H>  4.2   |  21<L>  |  0.95    Ca    9.1      04 Apr 2024 07:16    TPro  7.5  /  Alb  3.6  /  TBili  0.7  /  DBili  x   /  AST  9<L>  /  ALT  9<L>  /  AlkPhos  90  04-02    Lactate:              Urinalysis Basic - ( 04 Apr 2024 07:16 )    Color: x / Appearance: x / SG: x / pH: x  Gluc: 126 mg/dL / Ketone: x  / Bili: x / Urobili: x   Blood: x / Protein: x / Nitrite: x   Leuk Esterase: x / RBC: x / WBC x   Sq Epi: x / Non Sq Epi: x / Bacteria: x        IMAGING:  < from: [facility] Physiol Extremity Lower 3+ Level, BI (04.03.24 @ 13:11) >    ACC: [id number] EXAM:  PHYSIOL EXTREM LOW 3+ LEV BI   ORDERED BY:  TAISHA WAN     PROCEDURE DATE:  04/03/2024          INTERPRETATION:  Clinical information: History of smoking, nondiabetic,   nonhealing ulcer left lower extremity. Diminished distal pulses.    COMPARISON: None available.    TECHNIQUE: ABIs/PVR.    FINDINGS: Ankle brachial index measures 1.10 on the right and 0.65 on the   left. Segmental pressure gradient is present above the thigh level on the   left. Toe brachial index measures 0.97 on the right and 0.30 on the left.    PVR waveforms are diminished in pulsatility throughout the left leg   compared to the right. Left metatarsal and digital waveforms are   diminished in amplitude as well.    IMPRESSION: Moderate arterial flow limitation in the left lower extremity   localizing to the iliofemoral distribution. Consider lower extremity   arterial duplex study for further evaluation.    --- End of Report ---            JELLY FINCH MD; Attending Radiologist  This document has been electronically signed. Apr  3 2024  5:09PM    < end of copied text >                                                                                               General Surgery Consult  Consulting surgical team: vascular  Consulting attending: Dr. Rodriguez    HPI:  59M active smoker hx stage IV colon cancer, s/p colectomy with ostomy 1/2024, peripheral neuropathy 2/2 neoadjuvant chemo, sent in from his podiatrist's office with concern for osteomyelitis of the left foot. Patient was unaware of any infection, who was reportedly concerned for osteo sent him in for admission and IV antibiotics. Patient with xray negative for left toe OM, however MRI sf left foot 5th met head OM, 5th proximal phalanx base OM. Podiatry planning left foot 5th met head resection with graft application vs LWC, pending vascular surgery recommendations.        PAST MEDICAL HISTORY:  Colon cancer  COVID-19 vaccination refused  Tobacco dependence    PAST SURGICAL HISTORY:  S/P colectomy  History of creation of ostomy      MEDICATIONS:  acetaminophen     Tablet .. 650 milliGRAM(s) Oral every 6 hours PRN  albuterol    90 MICROgram(s) HFA Inhaler 2 Puff(s) Inhalation every 6 hours PRN  cefepime   IVPB 2000 milliGRAM(s) IV Intermittent every 8 hours  chlorhexidine 2% Cloths 1 Application(s) Topical <User Schedule>  dextrose 5%. 1000 milliLiter(s) IV Continuous <Continuous>  dextrose 5%. 1000 milliLiter(s) IV Continuous <Continuous>  dextrose 50% Injectable 25 Gram(s) IV Push once  dextrose 50% Injectable 25 Gram(s) IV Push once  dextrose 50% Injectable 12.5 Gram(s) IV Push once  dextrose Oral Gel 15 Gram(s) Oral once PRN  enoxaparin Injectable 40 milliGRAM(s) SubCutaneous every 24 hours  glucagon  Injectable 1 milliGRAM(s) IntraMuscular once  influenza   Vaccine 0.5 milliLiter(s) IntraMuscular once  insulin lispro (ADMELOG) corrective regimen sliding scale   SubCutaneous three times a day before meals  insulin lispro (ADMELOG) corrective regimen sliding scale   SubCutaneous at bedtime      ALLERGIES:  No Known Allergies      VITALS & I/Os:  Vital Signs Last 24 Hrs  T(C): 36.7 (04 Apr 2024 11:45), Max: 37.6 (03 Apr 2024 23:59)  T(F): 98.1 (04 Apr 2024 11:45), Max: 99.6 (03 Apr 2024 23:59)  HR: 68 (04 Apr 2024 11:45) (68 - 90)  BP: 132/69 (04 Apr 2024 11:45) (120/72 - 149/69)  BP(mean): --  RR: 18 (04 Apr 2024 11:45) (17 - 18)  SpO2: 97% (04 Apr 2024 11:45) (95% - 99%)    Parameters below as of 04 Apr 2024 11:45  Patient On (Oxygen Delivery Method): room air        I&O's Summary    03 Apr 2024 07:01  -  04 Apr 2024 07:00  --------------------------------------------------------  IN: 540 mL / OUT: 1525 mL / NET: -985 mL        PHYSICAL EXAM:  General: not acutely distressed  Neurological: AO X4  Respiratory: nonlabored respirations  Cardiac: pulse present  Abdominal: soft, nontender, nondistended, no rebound, no guarding, no palpable mass. Well-healed colectomy scars. Ostomy with stool and gas  Extremities: warm, motor function and sensation intact. Left fifth toe with dry, necrotic wound at base of toe, nontender, without drainage  Vascular: right DP/PT palpable, left DP/PT dopplerable       LABS:                        11.4   11.47 )-----------( 337      ( 04 Apr 2024 07:17 )             33.1     04-04    136  |  101  |  21  ----------------------------<  126<H>  4.2   |  21<L>  |  0.95    Ca    9.1      04 Apr 2024 07:16    TPro  7.5  /  Alb  3.6  /  TBili  0.7  /  DBili  x   /  AST  9<L>  /  ALT  9<L>  /  AlkPhos  90  04-02    Lactate:              Urinalysis Basic - ( 04 Apr 2024 07:16 )    Color: x / Appearance: x / SG: x / pH: x  Gluc: 126 mg/dL / Ketone: x  / Bili: x / Urobili: x   Blood: x / Protein: x / Nitrite: x   Leuk Esterase: x / RBC: x / WBC x   Sq Epi: x / Non Sq Epi: x / Bacteria: x        IMAGING:  < from: [facility] Physiol Extremity Lower 3+ Level, BI (04.03.24 @ 13:11) >    ACC: [id number] EXAM:  PHYSIOL EXTREM LOW 3+ LEV BI   ORDERED BY:  TAISHA WAN     PROCEDURE DATE:  04/03/2024          INTERPRETATION:  Clinical information: History of smoking, nondiabetic,   nonhealing ulcer left lower extremity. Diminished distal pulses.    COMPARISON: None available.    TECHNIQUE: ABIs/PVR.    FINDINGS: Ankle brachial index measures 1.10 on the right and 0.65 on the   left. Segmental pressure gradient is present above the thigh level on the   left. Toe brachial index measures 0.97 on the right and 0.30 on the left.    PVR waveforms are diminished in pulsatility throughout the left leg   compared to the right. Left metatarsal and digital waveforms are   diminished in amplitude as well.    IMPRESSION: Moderate arterial flow limitation in the left lower extremity   localizing to the iliofemoral distribution. Consider lower extremity   arterial duplex study for further evaluation.    --- End of Report ---            JELLY FINCH MD; Attending Radiologist  This document has been electronically signed. Apr  3 2024  5:09PM    < end of copied text >                                                                                               General Surgery Consult  Consulting surgical team: vascular  Consulting attending: Dr. Rodriguez    HPI:  59M active smoker hx stage IV colon cancer, s/p colectomy with ostomy 1/2024, peripheral neuropathy 2/2 neoadjuvant chemo, sent in from his podiatrist's office with concern for osteomyelitis of the left foot. Patient was previously unaware of any infection, but noticed a wound on his left foot before visiting his oncologist on Tuesday, when he had been due to start adjuvant chemotherapy. His oncologist sent him to the ED for evaluation. Patient denies any hx of foot wounds and has never seen a vascular surgeon. He ambulates without difficulty and denies claudication or rest pain. States he did not have any foot wounds one-three weeks ago. Patient with xray negative for left toe OM, however MRI sf left foot 5th met head OM, 5th proximal phalanx base OM. Podiatry planning left foot 5th met head resection with graft application vs LWC, pending vascular surgery recommendations.    PAST MEDICAL HISTORY:  Colon cancer  COVID-19 vaccination refused  Tobacco dependence    PAST SURGICAL HISTORY:  S/P colectomy  History of creation of ostomy      MEDICATIONS:  acetaminophen     Tablet .. 650 milliGRAM(s) Oral every 6 hours PRN  albuterol    90 MICROgram(s) HFA Inhaler 2 Puff(s) Inhalation every 6 hours PRN  cefepime   IVPB 2000 milliGRAM(s) IV Intermittent every 8 hours  chlorhexidine 2% Cloths 1 Application(s) Topical <User Schedule>  dextrose 5%. 1000 milliLiter(s) IV Continuous <Continuous>  dextrose 5%. 1000 milliLiter(s) IV Continuous <Continuous>  dextrose 50% Injectable 25 Gram(s) IV Push once  dextrose 50% Injectable 25 Gram(s) IV Push once  dextrose 50% Injectable 12.5 Gram(s) IV Push once  dextrose Oral Gel 15 Gram(s) Oral once PRN  enoxaparin Injectable 40 milliGRAM(s) SubCutaneous every 24 hours  glucagon  Injectable 1 milliGRAM(s) IntraMuscular once  influenza   Vaccine 0.5 milliLiter(s) IntraMuscular once  insulin lispro (ADMELOG) corrective regimen sliding scale   SubCutaneous three times a day before meals  insulin lispro (ADMELOG) corrective regimen sliding scale   SubCutaneous at bedtime      ALLERGIES:  No Known Allergies      VITALS & I/Os:  Vital Signs Last 24 Hrs  T(C): 36.7 (04 Apr 2024 11:45), Max: 37.6 (03 Apr 2024 23:59)  T(F): 98.1 (04 Apr 2024 11:45), Max: 99.6 (03 Apr 2024 23:59)  HR: 68 (04 Apr 2024 11:45) (68 - 90)  BP: 132/69 (04 Apr 2024 11:45) (120/72 - 149/69)  BP(mean): --  RR: 18 (04 Apr 2024 11:45) (17 - 18)  SpO2: 97% (04 Apr 2024 11:45) (95% - 99%)    Parameters below as of 04 Apr 2024 11:45  Patient On (Oxygen Delivery Method): room air        I&O's Summary    03 Apr 2024 07:01  -  04 Apr 2024 07:00  --------------------------------------------------------  IN: 540 mL / OUT: 1525 mL / NET: -985 mL        PHYSICAL EXAM:  General: not acutely distressed  Neurological: AO X4  Respiratory: nonlabored respirations  Cardiac: pulse present  Abdominal: soft, nontender, nondistended, no rebound, no guarding, no palpable mass. Well-healed colectomy scars. Ostomy with stool and gas  Extremities: warm, motor function and sensation intact. Left fifth toe with dry, necrotic wound at base of toe, nontender, without drainage  Vascular: right DP/PT palpable, left DP/PT dopplerable       LABS:                        11.4   11.47 )-----------( 337      ( 04 Apr 2024 07:17 )             33.1     04-04    136  |  101  |  21  ----------------------------<  126<H>  4.2   |  21<L>  |  0.95    Ca    9.1      04 Apr 2024 07:16    TPro  7.5  /  Alb  3.6  /  TBili  0.7  /  DBili  x   /  AST  9<L>  /  ALT  9<L>  /  AlkPhos  90  04-02    Lactate:              Urinalysis Basic - ( 04 Apr 2024 07:16 )    Color: x / Appearance: x / SG: x / pH: x  Gluc: 126 mg/dL / Ketone: x  / Bili: x / Urobili: x   Blood: x / Protein: x / Nitrite: x   Leuk Esterase: x / RBC: x / WBC x   Sq Epi: x / Non Sq Epi: x / Bacteria: x        IMAGING:  < from: [facility] Physiol Extremity Lower 3+ Level, BI (04.03.24 @ 13:11) >    ACC: [id number] EXAM:  PHYSIOL EXTREM LOW 3+ LEV BI   ORDERED BY:  TAISHA WAN     PROCEDURE DATE:  04/03/2024          INTERPRETATION:  Clinical information: History of smoking, nondiabetic,   nonhealing ulcer left lower extremity. Diminished distal pulses.    COMPARISON: None available.    TECHNIQUE: ABIs/PVR.    FINDINGS: Ankle brachial index measures 1.10 on the right and 0.65 on the   left. Segmental pressure gradient is present above the thigh level on the   left. Toe brachial index measures 0.97 on the right and 0.30 on the left.    PVR waveforms are diminished in pulsatility throughout the left leg   compared to the right. Left metatarsal and digital waveforms are   diminished in amplitude as well.    IMPRESSION: Moderate arterial flow limitation in the left lower extremity   localizing to the iliofemoral distribution. Consider lower extremity   arterial duplex study for further evaluation.    --- End of Report ---            JELLY FINCH MD; Attending Radiologist  This document has been electronically signed. Apr  3 2024  5:09PM    < end of copied text >                                                                                               General Surgery Consult  Consulting surgical team: vascular  Consulting attending: Dr. Rodriguez    HPI:  59M active smoker hx stage IV colon cancer, s/p colectomy with ostomy 1/2024, peripheral neuropathy 2/2 neoadjuvant chemo, sent in from his podiatrist's office with concern for osteomyelitis of the left foot. Patient was previously unaware of any infection, but noticed a wound on his left foot before visiting his oncologist on Tuesday, when he had been due to start adjuvant chemotherapy. His oncologist sent him to the ED for evaluation. Patient denies any hx of foot wounds and has never seen a vascular surgeon. He ambulates without difficulty and denies claudication or rest pain. States he did not have any foot wounds one-three weeks ago. Patient with xray negative for left toe OM, however MRI sf left foot 5th met head OM, 5th proximal phalanx base OM. Podiatry planning left foot 5th met head resection with graft application vs LWC, pending vascular surgery recommendations.    PAST MEDICAL HISTORY:  Colon cancer  COVID-19 vaccination refused  Tobacco dependence    PAST SURGICAL HISTORY:  S/P colectomy  History of creation of ostomy      MEDICATIONS:  acetaminophen     Tablet .. 650 milliGRAM(s) Oral every 6 hours PRN  albuterol    90 MICROgram(s) HFA Inhaler 2 Puff(s) Inhalation every 6 hours PRN  cefepime   IVPB 2000 milliGRAM(s) IV Intermittent every 8 hours  chlorhexidine 2% Cloths 1 Application(s) Topical <User Schedule>  dextrose 5%. 1000 milliLiter(s) IV Continuous <Continuous>  dextrose 5%. 1000 milliLiter(s) IV Continuous <Continuous>  dextrose 50% Injectable 25 Gram(s) IV Push once  dextrose 50% Injectable 25 Gram(s) IV Push once  dextrose 50% Injectable 12.5 Gram(s) IV Push once  dextrose Oral Gel 15 Gram(s) Oral once PRN  enoxaparin Injectable 40 milliGRAM(s) SubCutaneous every 24 hours  glucagon  Injectable 1 milliGRAM(s) IntraMuscular once  influenza   Vaccine 0.5 milliLiter(s) IntraMuscular once  insulin lispro (ADMELOG) corrective regimen sliding scale   SubCutaneous three times a day before meals  insulin lispro (ADMELOG) corrective regimen sliding scale   SubCutaneous at bedtime      ALLERGIES:  No Known Allergies      VITALS & I/Os:  Vital Signs Last 24 Hrs  T(C): 36.7 (04 Apr 2024 11:45), Max: 37.6 (03 Apr 2024 23:59)  T(F): 98.1 (04 Apr 2024 11:45), Max: 99.6 (03 Apr 2024 23:59)  HR: 68 (04 Apr 2024 11:45) (68 - 90)  BP: 132/69 (04 Apr 2024 11:45) (120/72 - 149/69)  BP(mean): --  RR: 18 (04 Apr 2024 11:45) (17 - 18)  SpO2: 97% (04 Apr 2024 11:45) (95% - 99%)    Parameters below as of 04 Apr 2024 11:45  Patient On (Oxygen Delivery Method): room air        I&O's Summary    03 Apr 2024 07:01  -  04 Apr 2024 07:00  --------------------------------------------------------  IN: 540 mL / OUT: 1525 mL / NET: -985 mL        PHYSICAL EXAM:  General: not acutely distressed  Neurological: AO X4  Respiratory: nonlabored respirations  Cardiac: pulse present  Abdominal: soft, nontender, nondistended, no rebound, no guarding, no palpable mass. Well-healed colectomy scars. Ostomy with stool and gas  Extremities: warm, motor function and sensation intact. Left fifth toe with dry, necrotic wound at base of toe, nontender, without drainage  Vascular: femoral and popliteal pulses palpable bilaterally.right DP/PT palpable, left DP/PT dopplerable       LABS:                        11.4   11.47 )-----------( 337      ( 04 Apr 2024 07:17 )             33.1     04-04    136  |  101  |  21  ----------------------------<  126<H>  4.2   |  21<L>  |  0.95    Ca    9.1      04 Apr 2024 07:16    TPro  7.5  /  Alb  3.6  /  TBili  0.7  /  DBili  x   /  AST  9<L>  /  ALT  9<L>  /  AlkPhos  90  04-02    Lactate:              Urinalysis Basic - ( 04 Apr 2024 07:16 )    Color: x / Appearance: x / SG: x / pH: x  Gluc: 126 mg/dL / Ketone: x  / Bili: x / Urobili: x   Blood: x / Protein: x / Nitrite: x   Leuk Esterase: x / RBC: x / WBC x   Sq Epi: x / Non Sq Epi: x / Bacteria: x        IMAGING:  < from: [facility] Physiol Extremity Lower 3+ Level, BI (04.03.24 @ 13:11) >    ACC: [id number] EXAM:  PHYSIOL EXTREM LOW 3+ LEV BI   ORDERED BY:  TAISHA WAN     PROCEDURE DATE:  04/03/2024          INTERPRETATION:  Clinical information: History of smoking, nondiabetic,   nonhealing ulcer left lower extremity. Diminished distal pulses.    COMPARISON: None available.    TECHNIQUE: ABIs/PVR.    FINDINGS: Ankle brachial index measures 1.10 on the right and 0.65 on the   left. Segmental pressure gradient is present above the thigh level on the   left. Toe brachial index measures 0.97 on the right and 0.30 on the left.    PVR waveforms are diminished in pulsatility throughout the left leg   compared to the right. Left metatarsal and digital waveforms are   diminished in amplitude as well.    IMPRESSION: Moderate arterial flow limitation in the left lower extremity   localizing to the iliofemoral distribution. Consider lower extremity   arterial duplex study for further evaluation.    --- End of Report ---            JELLY FINCH MD; Attending Radiologist  This document has been electronically signed. Apr  3 2024  5:09PM    < end of copied text >                                                                                               General Surgery Consult  Consulting surgical team: vascular  Consulting attending: Dr. Rodriguez    HPI:  59M active smoker hx stage IV colon cancer, s/p colectomy with ostomy 1/2024, peripheral neuropathy 2/2 neoadjuvant chemo, sent in from his podiatrist's office with concern for osteomyelitis of the left foot. Patient was previously unaware of any infection, but noticed a wound on his left foot before visiting his oncologist on Tuesday, when he had been due to start adjuvant chemotherapy. His oncologist sent him to the ED for evaluation. Patient denies any hx of foot wounds and has never seen a vascular surgeon. He ambulates without difficulty and denies claudication or rest pain. States he did not have any foot wounds one-three weeks ago. Patient with xray negative for left toe OM, however MRI sf left foot 5th met head OM, 5th proximal phalanx base OM. S/p incision and drainage of wound in ED by podiatry, now planning for left foot 5th metatarsal head resection with graft application vs LWC, pending vascular surgery recommendations.    PAST MEDICAL HISTORY:  Colon cancer  COVID-19 vaccination refused  Tobacco dependence    PAST SURGICAL HISTORY:  S/P colectomy  History of creation of ostomy      MEDICATIONS:  acetaminophen     Tablet .. 650 milliGRAM(s) Oral every 6 hours PRN  albuterol    90 MICROgram(s) HFA Inhaler 2 Puff(s) Inhalation every 6 hours PRN  cefepime   IVPB 2000 milliGRAM(s) IV Intermittent every 8 hours  chlorhexidine 2% Cloths 1 Application(s) Topical <User Schedule>  dextrose 5%. 1000 milliLiter(s) IV Continuous <Continuous>  dextrose 5%. 1000 milliLiter(s) IV Continuous <Continuous>  dextrose 50% Injectable 25 Gram(s) IV Push once  dextrose 50% Injectable 25 Gram(s) IV Push once  dextrose 50% Injectable 12.5 Gram(s) IV Push once  dextrose Oral Gel 15 Gram(s) Oral once PRN  enoxaparin Injectable 40 milliGRAM(s) SubCutaneous every 24 hours  glucagon  Injectable 1 milliGRAM(s) IntraMuscular once  influenza   Vaccine 0.5 milliLiter(s) IntraMuscular once  insulin lispro (ADMELOG) corrective regimen sliding scale   SubCutaneous three times a day before meals  insulin lispro (ADMELOG) corrective regimen sliding scale   SubCutaneous at bedtime      ALLERGIES:  No Known Allergies      VITALS & I/Os:  Vital Signs Last 24 Hrs  T(C): 36.7 (04 Apr 2024 11:45), Max: 37.6 (03 Apr 2024 23:59)  T(F): 98.1 (04 Apr 2024 11:45), Max: 99.6 (03 Apr 2024 23:59)  HR: 68 (04 Apr 2024 11:45) (68 - 90)  BP: 132/69 (04 Apr 2024 11:45) (120/72 - 149/69)  BP(mean): --  RR: 18 (04 Apr 2024 11:45) (17 - 18)  SpO2: 97% (04 Apr 2024 11:45) (95% - 99%)    Parameters below as of 04 Apr 2024 11:45  Patient On (Oxygen Delivery Method): room air        I&O's Summary    03 Apr 2024 07:01  -  04 Apr 2024 07:00  --------------------------------------------------------  IN: 540 mL / OUT: 1525 mL / NET: -985 mL        PHYSICAL EXAM:  General: not acutely distressed  Neurological: AO X4  Respiratory: nonlabored respirations  Cardiac: pulse present  Abdominal: soft, nontender, nondistended, no rebound, no guarding, no palpable mass. Well-healed colectomy scars. Ostomy with stool and gas  Extremities: warm, motor function and sensation intact. Left fifth toe with dry, necrotic wound at base of toe, nontender, without drainage  Vascular: femoral and popliteal pulses palpable bilaterally.right DP/PT palpable, left DP/PT dopplerable       LABS:                        11.4   11.47 )-----------( 337      ( 04 Apr 2024 07:17 )             33.1     04-04    136  |  101  |  21  ----------------------------<  126<H>  4.2   |  21<L>  |  0.95    Ca    9.1      04 Apr 2024 07:16    TPro  7.5  /  Alb  3.6  /  TBili  0.7  /  DBili  x   /  AST  9<L>  /  ALT  9<L>  /  AlkPhos  90  04-02    Lactate:              Urinalysis Basic - ( 04 Apr 2024 07:16 )    Color: x / Appearance: x / SG: x / pH: x  Gluc: 126 mg/dL / Ketone: x  / Bili: x / Urobili: x   Blood: x / Protein: x / Nitrite: x   Leuk Esterase: x / RBC: x / WBC x   Sq Epi: x / Non Sq Epi: x / Bacteria: x        IMAGING:  < from: [facility] Physiol Extremity Lower 3+ Level, BI (04.03.24 @ 13:11) >    ACC: [id number] EXAM:  PHYSIOL EXTREM LOW 3+ LEV BI   ORDERED BY:  TAISHA WAN     PROCEDURE DATE:  04/03/2024          INTERPRETATION:  Clinical information: History of smoking, nondiabetic,   nonhealing ulcer left lower extremity. Diminished distal pulses.    COMPARISON: None available.    TECHNIQUE: ABIs/PVR.    FINDINGS: Ankle brachial index measures 1.10 on the right and 0.65 on the   left. Segmental pressure gradient is present above the thigh level on the   left. Toe brachial index measures 0.97 on the right and 0.30 on the left.    PVR waveforms are diminished in pulsatility throughout the left leg   compared to the right. Left metatarsal and digital waveforms are   diminished in amplitude as well.    IMPRESSION: Moderate arterial flow limitation in the left lower extremity   localizing to the iliofemoral distribution. Consider lower extremity   arterial duplex study for further evaluation.    --- End of Report ---            JELLY FINCH MD; Attending Radiologist  This document has been electronically signed. Apr  3 2024  5:09PM    < end of copied text >                                                                                               General Surgery Consult  Consulting surgical team: vascular  Consulting attending: Dr. Rodriguez    HPI:  59M active smoker (48-pack-year history) hx stage IV colon cancer, s/p colectomy with ostomy 1/2024, peripheral neuropathy 2/2 neoadjuvant chemo, sent in from his podiatrist's office with concern for osteomyelitis of the left foot. Patient was previously unaware of any infection, but noticed a wound on his left foot before visiting his oncologist on Tuesday, when he had been due to start adjuvant chemotherapy. His oncologist sent him to the ED for evaluation. Patient denies any hx of foot wounds and has never seen a vascular surgeon. He ambulates without difficulty and denies claudication or rest pain. States he did not have any foot wounds one-three weeks ago. Patient with xray negative for left toe OM, however MRI sf left foot 5th met head OM, 5th proximal phalanx base OM. S/p incision and drainage of wound in ED by podiatry, now planning for left foot 5th metatarsal head resection with graft application vs LWC, pending vascular surgery recommendations. DAMIEN PVR 0.65 on left, left toe 0.30, cf moderate arterial flow limitation in the left lower extremity localizing to the iliofemoral distribution.    PAST MEDICAL HISTORY:  Colon cancer  COVID-19 vaccination refused  Tobacco dependence    PAST SURGICAL HISTORY:  S/P colectomy with creation of ostomy  Removal of vocal cord polyps    MEDICATIONS:  acetaminophen     Tablet .. 650 milliGRAM(s) Oral every 6 hours PRN  albuterol    90 MICROgram(s) HFA Inhaler 2 Puff(s) Inhalation every 6 hours PRN  cefepime   IVPB 2000 milliGRAM(s) IV Intermittent every 8 hours  chlorhexidine 2% Cloths 1 Application(s) Topical <User Schedule>  dextrose 5%. 1000 milliLiter(s) IV Continuous <Continuous>  dextrose 5%. 1000 milliLiter(s) IV Continuous <Continuous>  dextrose 50% Injectable 25 Gram(s) IV Push once  dextrose 50% Injectable 25 Gram(s) IV Push once  dextrose 50% Injectable 12.5 Gram(s) IV Push once  dextrose Oral Gel 15 Gram(s) Oral once PRN  enoxaparin Injectable 40 milliGRAM(s) SubCutaneous every 24 hours  glucagon  Injectable 1 milliGRAM(s) IntraMuscular once  influenza   Vaccine 0.5 milliLiter(s) IntraMuscular once  insulin lispro (ADMELOG) corrective regimen sliding scale   SubCutaneous three times a day before meals  insulin lispro (ADMELOG) corrective regimen sliding scale   SubCutaneous at bedtime      ALLERGIES:  No Known Allergies      VITALS & I/Os:  Vital Signs Last 24 Hrs  T(C): 36.7 (04 Apr 2024 11:45), Max: 37.6 (03 Apr 2024 23:59)  T(F): 98.1 (04 Apr 2024 11:45), Max: 99.6 (03 Apr 2024 23:59)  HR: 68 (04 Apr 2024 11:45) (68 - 90)  BP: 132/69 (04 Apr 2024 11:45) (120/72 - 149/69)  BP(mean): --  RR: 18 (04 Apr 2024 11:45) (17 - 18)  SpO2: 97% (04 Apr 2024 11:45) (95% - 99%)    Parameters below as of 04 Apr 2024 11:45  Patient On (Oxygen Delivery Method): room air        I&O's Summary    03 Apr 2024 07:01  -  04 Apr 2024 07:00  --------------------------------------------------------  IN: 540 mL / OUT: 1525 mL / NET: -985 mL        PHYSICAL EXAM:  General: not acutely distressed  Neurological: AO X4  Respiratory: nonlabored respirations  Cardiac: pulse present  Abdominal: soft, nontender, nondistended, no rebound, no guarding, no palpable mass. Well-healed colectomy scars. Ostomy with stool and gas  Extremities: warm, motor function and sensation intact. Left fifth toe with dry, necrotic wound at base of toe, nontender, without drainage  Vascular: femoral and popliteal pulses palpable bilaterally.right DP/PT palpable, left DP/PT dopplerable       LABS:                        11.4   11.47 )-----------( 337      ( 04 Apr 2024 07:17 )             33.1     04-04    136  |  101  |  21  ----------------------------<  126<H>  4.2   |  21<L>  |  0.95    Ca    9.1      04 Apr 2024 07:16    TPro  7.5  /  Alb  3.6  /  TBili  0.7  /  DBili  x   /  AST  9<L>  /  ALT  9<L>  /  AlkPhos  90  04-02    Lactate:              Urinalysis Basic - ( 04 Apr 2024 07:16 )    Color: x / Appearance: x / SG: x / pH: x  Gluc: 126 mg/dL / Ketone: x  / Bili: x / Urobili: x   Blood: x / Protein: x / Nitrite: x   Leuk Esterase: x / RBC: x / WBC x   Sq Epi: x / Non Sq Epi: x / Bacteria: x        IMAGING:  < from: [facility] Physiol Extremity Lower 3+ Level, BI (04.03.24 @ 13:11) >    ACC: [id number] EXAM:  PHYSIOL EXTREM LOW 3+ LEV BI   ORDERED BY:  TAIHSA WAN     PROCEDURE DATE:  04/03/2024          INTERPRETATION:  Clinical information: History of smoking, nondiabetic,   nonhealing ulcer left lower extremity. Diminished distal pulses.    COMPARISON: None available.    TECHNIQUE: ABIs/PVR.    FINDINGS: Ankle brachial index measures 1.10 on the right and 0.65 on the   left. Segmental pressure gradient is present above the thigh level on the   left. Toe brachial index measures 0.97 on the right and 0.30 on the left.    PVR waveforms are diminished in pulsatility throughout the left leg   compared to the right. Left metatarsal and digital waveforms are   diminished in amplitude as well.    IMPRESSION: Moderate arterial flow limitation in the left lower extremity   localizing to the iliofemoral distribution. Consider lower extremity   arterial duplex study for further evaluation.    --- End of Report ---            JELLY FINCH MD; Attending Radiologist  This document has been electronically signed. Apr  3 2024  5:09PM    < end of copied text >

## 2024-04-04 NOTE — PROGRESS NOTE ADULT - ASSESSMENT
EDA CHAWLA is a 59y Male active smoker with a hx of Stage IV rectal adenocarcinoma s/p colectomy with colostomy (1/2024) and T2DM c/b peripheral neuropathy who presented from PCP concerning for L foot infection, found to have L fifth metatarsal osteomyelitis.

## 2024-04-04 NOTE — CONSULT NOTE ADULT - ATTENDING COMMENTS
Left foot wound and osteomyelitis.   Nonpalpable left pedal pulses.  DAMIEN/PVRs consistent with left leg arterial insufficiency.   Left foot lateral plantar wound w/ necrotic edges.    PAD w/ left foot wound  - Would benefit from left leg angiogram with possible intervention prior to podiatric procedure. Likely early next week pending optimization.   - Continue local wound care and antibiotics. Left foot wound and osteomyelitis.   Nonpalpable left pedal pulses.  DAMIEN/PVRs consistent with left leg arterial insufficiency.   Left foot lateral plantar wound w/ necrotic edges.    PAD w/ left foot wound  - Would benefit from left leg angiogram with possible intervention prior to podiatric procedure. Likely early next week pending optimization.   - Continue local wound care and antibiotics.  - Please start aspirin 81 mg and statin if contraindication for medical management of pad.

## 2024-04-05 LAB
ANION GAP SERPL CALC-SCNC: 12 MMOL/L — SIGNIFICANT CHANGE UP (ref 5–17)
BUN SERPL-MCNC: 24 MG/DL — HIGH (ref 7–23)
CALCIUM SERPL-MCNC: 9.2 MG/DL — SIGNIFICANT CHANGE UP (ref 8.4–10.5)
CHLORIDE SERPL-SCNC: 100 MMOL/L — SIGNIFICANT CHANGE UP (ref 96–108)
CO2 SERPL-SCNC: 24 MMOL/L — SIGNIFICANT CHANGE UP (ref 22–31)
CREAT SERPL-MCNC: 0.98 MG/DL — SIGNIFICANT CHANGE UP (ref 0.5–1.3)
EGFR: 89 ML/MIN/1.73M2 — SIGNIFICANT CHANGE UP
GLUCOSE BLDC GLUCOMTR-MCNC: 137 MG/DL — HIGH (ref 70–99)
GLUCOSE BLDC GLUCOMTR-MCNC: 144 MG/DL — HIGH (ref 70–99)
GLUCOSE BLDC GLUCOMTR-MCNC: 239 MG/DL — HIGH (ref 70–99)
GLUCOSE BLDC GLUCOMTR-MCNC: 248 MG/DL — HIGH (ref 70–99)
GLUCOSE BLDC GLUCOMTR-MCNC: 249 MG/DL — HIGH (ref 70–99)
GLUCOSE BLDC GLUCOMTR-MCNC: 366 MG/DL — HIGH (ref 70–99)
GLUCOSE SERPL-MCNC: 247 MG/DL — HIGH (ref 70–99)
HCT VFR BLD CALC: 32 % — LOW (ref 39–50)
HGB BLD-MCNC: 11.1 G/DL — LOW (ref 13–17)
MAGNESIUM SERPL-MCNC: 1.8 MG/DL — SIGNIFICANT CHANGE UP (ref 1.6–2.6)
MCHC RBC-ENTMCNC: 28.5 PG — SIGNIFICANT CHANGE UP (ref 27–34)
MCHC RBC-ENTMCNC: 34.7 GM/DL — SIGNIFICANT CHANGE UP (ref 32–36)
MCV RBC AUTO: 82.1 FL — SIGNIFICANT CHANGE UP (ref 80–100)
NRBC # BLD: 0 /100 WBCS — SIGNIFICANT CHANGE UP (ref 0–0)
PHOSPHATE SERPL-MCNC: 3.1 MG/DL — SIGNIFICANT CHANGE UP (ref 2.5–4.5)
PLATELET # BLD AUTO: 330 K/UL — SIGNIFICANT CHANGE UP (ref 150–400)
POTASSIUM SERPL-MCNC: 4.2 MMOL/L — SIGNIFICANT CHANGE UP (ref 3.5–5.3)
POTASSIUM SERPL-SCNC: 4.2 MMOL/L — SIGNIFICANT CHANGE UP (ref 3.5–5.3)
RBC # BLD: 3.9 M/UL — LOW (ref 4.2–5.8)
RBC # FLD: 11.8 % — SIGNIFICANT CHANGE UP (ref 10.3–14.5)
SODIUM SERPL-SCNC: 136 MMOL/L — SIGNIFICANT CHANGE UP (ref 135–145)
WBC # BLD: 9.88 K/UL — SIGNIFICANT CHANGE UP (ref 3.8–10.5)
WBC # FLD AUTO: 9.88 K/UL — SIGNIFICANT CHANGE UP (ref 3.8–10.5)

## 2024-04-05 PROCEDURE — 99223 1ST HOSP IP/OBS HIGH 75: CPT

## 2024-04-05 PROCEDURE — 93926 LOWER EXTREMITY STUDY: CPT | Mod: 26,LT

## 2024-04-05 RX ORDER — PIPERACILLIN AND TAZOBACTAM 4; .5 G/20ML; G/20ML
3.38 INJECTION, POWDER, LYOPHILIZED, FOR SOLUTION INTRAVENOUS EVERY 8 HOURS
Refills: 0 | Status: DISCONTINUED | OUTPATIENT
Start: 2024-04-05 | End: 2024-04-10

## 2024-04-05 RX ORDER — PIPERACILLIN AND TAZOBACTAM 4; .5 G/20ML; G/20ML
3.38 INJECTION, POWDER, LYOPHILIZED, FOR SOLUTION INTRAVENOUS ONCE
Refills: 0 | Status: COMPLETED | OUTPATIENT
Start: 2024-04-05 | End: 2024-04-05

## 2024-04-05 RX ADMIN — PIPERACILLIN AND TAZOBACTAM 25 GRAM(S): 4; .5 INJECTION, POWDER, LYOPHILIZED, FOR SOLUTION INTRAVENOUS at 15:52

## 2024-04-05 RX ADMIN — Medication 2: at 13:37

## 2024-04-05 RX ADMIN — ENOXAPARIN SODIUM 40 MILLIGRAM(S): 100 INJECTION SUBCUTANEOUS at 22:29

## 2024-04-05 RX ADMIN — PIPERACILLIN AND TAZOBACTAM 25 GRAM(S): 4; .5 INJECTION, POWDER, LYOPHILIZED, FOR SOLUTION INTRAVENOUS at 22:29

## 2024-04-05 RX ADMIN — CHLORHEXIDINE GLUCONATE 1 APPLICATION(S): 213 SOLUTION TOPICAL at 06:26

## 2024-04-05 RX ADMIN — PIPERACILLIN AND TAZOBACTAM 200 GRAM(S): 4; .5 INJECTION, POWDER, LYOPHILIZED, FOR SOLUTION INTRAVENOUS at 10:37

## 2024-04-05 RX ADMIN — CEFEPIME 100 MILLIGRAM(S): 1 INJECTION, POWDER, FOR SOLUTION INTRAMUSCULAR; INTRAVENOUS at 06:26

## 2024-04-05 NOTE — PROGRESS NOTE ADULT - PROBLEM SELECTOR PLAN 1
In setting of diabetic foot wound. MRI demonstrating early osteomyelitis of L foot 5th metatarsal head and base of proximal phalanx.  - DAMIEN demonstrating LLE PAD  - BCx: NGTD  - Wound Cx: Klebsiella   - Podiatry following: OR for L foot 5th metatarsal head resection with graft application pending vascular  - Vascular surgery following: LLE angiogram with possible stent  - ID following: switch to Zosyn

## 2024-04-05 NOTE — PROGRESS NOTE ADULT - NSPROGADDITIONALINFOA_GEN_ALL_CORE
#diabetic foot wound with early osteomyelitis of L foot 5th metatarsal head and base of proximal phalanx.  - DAMIEN demonstrating LLE PAD  - BCx: NGTD  - Wound Cx: Klebsiella   - Podiatry following: OR for L foot 5th metatarsal head resection with graft application after LLE angio  - Vascular surgery following: LLE angiogram with possible stent; > mets 4 functional capacity; pt with intermediate risk for moderate risk procedure.

## 2024-04-05 NOTE — PROGRESS NOTE ADULT - ASSESSMENT
Mr. Menon is a 59 year old male active smoker with PMHx of  peripheral neuropathy, Type II DM,  anemia,  status post colectomy with colostomy 01/2024  due to rectal cancer under the care of Dr. Renny Purvis now admitted with osteomyelitis of the left foot.  MRI foot shows Findings suggestive of early osteomyelitis at the fifth metatarsal head and base of the proximal phalanx, deep to the soft tissue wound. Focal osteitis is also a differential consideration for the osseous findings. Podiatry pending resection with graft. Infectious disease consulted.    Labs show hemoglobin 11.6, hematocrit  34.7, MCV 83.6 platelet count 339, WBC 12.14 with neutrophilic predominance. Creatinine 1.12 with normal liver function.  HbA1c 6.8.  TTE with EF 69%.     Patient last seen in Hematology Oncology Clinic 04/02/2024  and oxaliplatin was discontinued due to peripheral neuropathy. Podiatry scheduled pt for left foot fifth metatarsal head resection with wound debridement and graft application 04/04/2024       # Stage IV Rectal Adenocarcinoma   - Diagnosed 08/2023  - Invasive, moderately differentiated colonic adenocarcinoma  - Low probability of MSI-high  - HER-2-Mary Negative  - KRAS is Wild Type  - Metastatic to liver  - FOLFOX and weekly Cetuximab started 9/18/23  - PET-CT from 03/20/2024 shows that the patient is status post surgery with no abnormal FDG uptake in the pelvis postsurgical area.  More prominent uptake in the gallbladder fossa (early recurrence versus normal fluctuation of uptake ) and no other FDG avid lesion in the field-of-view.  Previously noted liver Mets are not presently seen.  - Was scheduled to receive 5FU, Leucovorin and Cetuximab 04/02/2024, therapy held due to concern for L foot infection and pt sent to Podiatry   - Plan to continue therapy once able to from infection standpoint, pt is aware     # Left Foot Osteomyelitis  - Podiatry scheduled pt for left foot fifth metatarsal head resection with wound debridement and graft application 04/04/2024   - Antibiotics per ID and primary team     # Neutrophilia   - Due to infection  - Outpatient labs 04/02/2024 WBC 16.19 ANC 14,170  - Continue to monitor     # Normocytic anemia  - Likely multifactorial, active infection, cancer, chemotherapy, dilutional   - Outpatient labs 04/02/2024 Hb 13.7, MCV 83.5   - Continue to monitor  - Transfuse to maintain Hb > 7     # Nicotine dependence, active cigarette smoker  - Counseling provided, pt aware of possible compromised wound healing       Thank you for allowing me to participate in the care of Mr. Menon, please do not hesitate to call or text me if you have further questions or concerns.     Gato Horvath MD  [facility] [place]   Division of Hematology/Oncology  [facility] [place]note 2][address], [address]  [place] [address]  P: [phone]  F: [phone]    Attestation:    ----Pt evaluated including face-to-face interaction in addition to chart review, reviewing treatment plan, and managing the patient’s chronic diagnoses as listed in the assessment---- Mr. Menon is a 59 year old male active smoker with PMHx of  peripheral neuropathy, Type II DM,  anemia,  status post colectomy with colostomy 01/2024  due to rectal cancer under the care of Dr. Renny Purvis now admitted with osteomyelitis of the left foot.  MRI foot shows Findings suggestive of early osteomyelitis at the fifth metatarsal head and base of the proximal phalanx, deep to the soft tissue wound. Focal osteitis is also a differential consideration for the osseous findings. Podiatry pending resection with graft. Infectious disease consulted.    Labs show hemoglobin 11.6, hematocrit  34.7, MCV 83.6 platelet count 339, WBC 12.14 with neutrophilic predominance. Creatinine 1.12 with normal liver function.  HbA1c 6.8.  TTE with EF 69%.     Patient last seen in Hematology Oncology Clinic 04/02/2024  and oxaliplatin was discontinued due to peripheral neuropathy. Podiatry scheduled pt for left foot fifth metatarsal head resection with wound debridement and graft application 04/04/2024       # Stage IV Rectal Adenocarcinoma   - Diagnosed 08/2023  - Invasive, moderately differentiated colonic adenocarcinoma  - Low probability of MSI-high  - HER-2-Mary Negative  - KRAS is Wild Type  - Metastatic to liver  - FOLFOX and weekly Cetuximab started 9/18/23  - PET-CT from 03/20/2024 shows that the patient is status post surgery with no abnormal FDG uptake in the pelvis postsurgical area.  More prominent uptake in the gallbladder fossa (early recurrence versus normal fluctuation of uptake ) and no other FDG avid lesion in the field-of-view.  Previously noted liver Mets are not presently seen.  - Was scheduled to receive 5FU, Leucovorin and Cetuximab 04/02/2024, therapy held due to concern for L foot infection and pt sent to Podiatry   - Plan to continue therapy once able to from infection standpoint, pt is aware     # Left Foot Osteomyelitis  # PVD  - Podiatry scheduled pt for left foot fifth metatarsal head resection with wound debridement and graft application, delayed  - Vascular surgery DAMIEN suggestive of moderate arterial flow limitation in the left lower extremity localizing to the iliofemoral distribution, pending angiogram possible angioplasty and stent as required.  - Antibiotics per ID and primary team     # Neutrophilia   - Due to infection  - Outpatient labs 04/02/2024 WBC 16.19 ANC 14,170  - Continue to monitor, improving     # Normocytic anemia  - Likely multifactorial, active infection, cancer, chemotherapy, dilutional   - Outpatient labs 04/02/2024 Hb 13.7, MCV 83.5   - Continue to monitor  - Transfuse to maintain Hb > 7     # Nicotine dependence, active cigarette smoker  - Counseling provided, pt aware of possible compromised wound healing       Thank you for allowing me to participate in the care of Mr. Menon, please do not hesitate to call or text me if you have further questions or concerns.     Gato Horvath MD  [facility] [place]   Division of Hematology/Oncology  [address] [place] [address], [address]  [place] [address]  P: [phone]  F: [phone]    Attestation:    ----Pt evaluated including face-to-face interaction in addition to chart review, reviewing treatment plan, and managing the patient’s chronic diagnoses as listed in the assessment----

## 2024-04-05 NOTE — PROGRESS NOTE ADULT - SUBJECTIVE AND OBJECTIVE BOX
Patient is a 59y old  Male who presents with a chief complaint of Sent to the ER by PCP following concern for LEFT foot infection (05 Apr 2024 06:11)       INTERVAL HPI/OVERNIGHT EVENTS:  Patient seen and evaluated at bedside.  Pt is resting comfortable in NAD. Denies N/V/F/C.    Allergies    No Known Allergies    Intolerances        Vital Signs Last 24 Hrs  T(C): 36.7 (05 Apr 2024 05:10), Max: 37.1 (04 Apr 2024 21:04)  T(F): 98 (05 Apr 2024 05:10), Max: 98.8 (04 Apr 2024 21:04)  HR: 75 (05 Apr 2024 05:10) (68 - 82)  BP: 131/81 (05 Apr 2024 05:10) (131/81 - 135/78)  BP(mean): --  RR: 18 (05 Apr 2024 05:10) (18 - 18)  SpO2: 97% (05 Apr 2024 05:10) (97% - 98%)    Parameters below as of 05 Apr 2024 05:10  Patient On (Oxygen Delivery Method): room air        LABS:                        11.4   11.47 )-----------( 337      ( 04 Apr 2024 07:17 )             33.1     04-04    136  |  101  |  21  ----------------------------<  126<H>  4.2   |  21<L>  |  0.95    Ca    9.1      04 Apr 2024 07:16        Urinalysis Basic - ( 04 Apr 2024 07:16 )    Color: x / Appearance: x / SG: x / pH: x  Gluc: 126 mg/dL / Ketone: x  / Bili: x / Urobili: x   Blood: x / Protein: x / Nitrite: x   Leuk Esterase: x / RBC: x / WBC x   Sq Epi: x / Non Sq Epi: x / Bacteria: x      CAPILLARY BLOOD GLUCOSE      POCT Blood Glucose.: 124 mg/dL (04 Apr 2024 21:51)  POCT Blood Glucose.: 142 mg/dL (04 Apr 2024 17:22)  POCT Blood Glucose.: 111 mg/dL (04 Apr 2024 12:12)  POCT Blood Glucose.: 132 mg/dL (04 Apr 2024 08:35)      Lower Extremity Physical Exam:  Vascular: DP/PT 2/4, B/L, CFT <3  seconds B/L, Temperature gradient warm to cool B/L.   Neuro: Epicritic sensation diminished to the level of digits, B/L.  Musculoskeletal/Ortho: unremarkable   Skin: Left foot submet 5 wound to bone, fibronecrotic wound bed, tracking 1cm circumferentially, no malodor, no drainage, erythema to midfoot resolving. Right foot no open wounds, no acute signs of infection.     RADIOLOGY & ADDITIONAL TESTS:

## 2024-04-05 NOTE — PROGRESS NOTE ADULT - ASSESSMENT
Patient is a 59 year old male with PMH of stage IV colon cancer, s/p colectomy with ostomy 1/2024, peripheral neuropathy sent in from his podiatrist office with concern for osteomyelitis of the left foot.     L foot submet 5th wound with suspected early OM  Leukocytosis likely due to above   - noted L foot submet 5 wound to bone, fibronecrotic wound bed, tracking 1cm circumferentially malodorous, serous drainage, erythema to midfoot. S/p wound explored and 1cm incision proximal to wound down to level of subq, mild serous drainage expressed   - L foot wound culture polymicrobial -- Kleb oxytoca/Roultella ornithinolytica; Strep mitis/oralis grp, Peptoniphilus harei grp, Staph pettenkoferi  - MRI L foot suggestive of early OM at 5th metatarsal head and base of proximal phalanx, deep to soft tissue wound   - Bcx NGTD x2   - MRSA PCR screen negative, s/p vancomycin   - afebrile, WBC noted, elevated ESR/CRP (99/140)    Recommendations:   Vascular surgery following -- will likely require LLE angiogram, possible angioplasty, possible stent   Podiatry following - plan L foot 5th met head resection with graft application pending above  Discontinued cefepime   Started on zosyn 3.375g IV Q8h   Continue local wound care    Monitor temps/WBC    Over the weekend Dr. Dilma Duffy will be covering for our group. If you have any questions, concerns or new micro data, please reach out to them at [phone]    Cherelle Horvath M.D.  [facility], Division of Infectious Diseases  [phone]  After 5pm on weekdays and all day on weekends - please call [phone]

## 2024-04-05 NOTE — PROGRESS NOTE ADULT - SUBJECTIVE AND OBJECTIVE BOX
[facility] HEMATOLOGY/ONCOLOGY INPATIENT PROGRESS NOTE     Interval Hx:   04-05-24: Mr. Menon was seen at bedside today.    Meds:   MEDICATIONS  (STANDING):  cefepime   IVPB 2000 milliGRAM(s) IV Intermittent every 8 hours  chlorhexidine 2% Cloths 1 Application(s) Topical <User Schedule>  dextrose 5%. 1000 milliLiter(s) (50 mL/Hr) IV Continuous <Continuous>  dextrose 5%. 1000 milliLiter(s) (100 mL/Hr) IV Continuous <Continuous>  dextrose 50% Injectable 25 Gram(s) IV Push once  dextrose 50% Injectable 25 Gram(s) IV Push once  dextrose 50% Injectable 12.5 Gram(s) IV Push once  enoxaparin Injectable 40 milliGRAM(s) SubCutaneous every 24 hours  glucagon  Injectable 1 milliGRAM(s) IntraMuscular once  influenza   Vaccine 0.5 milliLiter(s) IntraMuscular once  insulin lispro (ADMELOG) corrective regimen sliding scale   SubCutaneous three times a day before meals  insulin lispro (ADMELOG) corrective regimen sliding scale   SubCutaneous at bedtime    MEDICATIONS  (PRN):  acetaminophen     Tablet .. 650 milliGRAM(s) Oral every 6 hours PRN Temp greater or equal to 38C (100.4F), Mild Pain (1 - 3)  albuterol    90 MICROgram(s) HFA Inhaler 2 Puff(s) Inhalation every 6 hours PRN Bronchospasm  dextrose Oral Gel 15 Gram(s) Oral once PRN Blood Glucose LESS THAN 70 milliGRAM(s)/deciliter    Vital Signs Last 24 Hrs  T(C): 36.7 (05 Apr 2024 05:10), Max: 37.1 (04 Apr 2024 21:04)  T(F): 98 (05 Apr 2024 05:10), Max: 98.8 (04 Apr 2024 21:04)  HR: 75 (05 Apr 2024 05:10) (68 - 82)  BP: 131/81 (05 Apr 2024 05:10) (131/81 - 135/78)  BP(mean): --  RR: 18 (05 Apr 2024 05:10) (18 - 18)  SpO2: 97% (05 Apr 2024 05:10) (97% - 98%)    Parameters below as of 05 Apr 2024 05:10  Patient On (Oxygen Delivery Method): room air    Physical Exam:  Gen: NAD  HEENT: EOMI, MMM  Chest: equal chest rise, speaking full sentences   Cardiac: RR  Abd: Nondistended  Ext: LLE/foot cellulitis, bandaged with betadine type solution   Neuro: AAOx3, normal mood and affect     Labs:                        11.4   11.47 )-----------( 337      ( 04 Apr 2024 07:17 )             33.1     CBC Full  -  ( 04 Apr 2024 07:17 )  WBC Count : 11.47 K/uL  RBC Count : 3.98 M/uL  Hemoglobin : 11.4 g/dL  Hematocrit : 33.1 %  Platelet Count - Automated : 337 K/uL  Mean Cell Volume : 83.2 fl  Mean Cell Hemoglobin : 28.6 pg  Mean Cell Hemoglobin Concentration : 34.4 gm/dL    04-04    136  |  101  |  21  ----------------------------<  126<H>  4.2   |  21<L>  |  0.95    Ca    9.1      04 Apr 2024 07:16         [facility] HEMATOLOGY/ONCOLOGY INPATIENT PROGRESS NOTE     Interval Hx:   04-05-24: Mr. Menon was seen at bedside today, podiatry intervention postponed given Vascular findings, vascular surgery detects moderate arterial flow limitation in the left lower extremity localizing to the iliofemoral distribution, pending angiogram possible angioplasty and stent if required. Discussed with pt he is aware.     Meds:   MEDICATIONS  (STANDING):  cefepime   IVPB 2000 milliGRAM(s) IV Intermittent every 8 hours  chlorhexidine 2% Cloths 1 Application(s) Topical <User Schedule>  dextrose 5%. 1000 milliLiter(s) (50 mL/Hr) IV Continuous <Continuous>  dextrose 5%. 1000 milliLiter(s) (100 mL/Hr) IV Continuous <Continuous>  dextrose 50% Injectable 25 Gram(s) IV Push once  dextrose 50% Injectable 25 Gram(s) IV Push once  dextrose 50% Injectable 12.5 Gram(s) IV Push once  enoxaparin Injectable 40 milliGRAM(s) SubCutaneous every 24 hours  glucagon  Injectable 1 milliGRAM(s) IntraMuscular once  influenza   Vaccine 0.5 milliLiter(s) IntraMuscular once  insulin lispro (ADMELOG) corrective regimen sliding scale   SubCutaneous three times a day before meals  insulin lispro (ADMELOG) corrective regimen sliding scale   SubCutaneous at bedtime    MEDICATIONS  (PRN):  acetaminophen     Tablet .. 650 milliGRAM(s) Oral every 6 hours PRN Temp greater or equal to 38C (100.4F), Mild Pain (1 - 3)  albuterol    90 MICROgram(s) HFA Inhaler 2 Puff(s) Inhalation every 6 hours PRN Bronchospasm  dextrose Oral Gel 15 Gram(s) Oral once PRN Blood Glucose LESS THAN 70 milliGRAM(s)/deciliter    Vital Signs Last 24 Hrs  T(C): 36.7 (05 Apr 2024 05:10), Max: 37.1 (04 Apr 2024 21:04)  T(F): 98 (05 Apr 2024 05:10), Max: 98.8 (04 Apr 2024 21:04)  HR: 75 (05 Apr 2024 05:10) (68 - 82)  BP: 131/81 (05 Apr 2024 05:10) (131/81 - 135/78)  BP(mean): --  RR: 18 (05 Apr 2024 05:10) (18 - 18)  SpO2: 97% (05 Apr 2024 05:10) (97% - 98%)    Parameters below as of 05 Apr 2024 05:10  Patient On (Oxygen Delivery Method): room air    Physical Exam:  Gen: NAD  HEENT: EOMI, MMM  Chest: equal chest rise, speaking full sentences   Cardiac: RR  Abd: Nondistended  Ext: LLE/foot cellulitis, bandaged with betadine type solution   Neuro: AAOx3, normal mood and affect     Labs:                        11.4   11.47 )-----------( 337      ( 04 Apr 2024 07:17 )             33.1     CBC Full  -  ( 04 Apr 2024 07:17 )  WBC Count : 11.47 K/uL  RBC Count : 3.98 M/uL  Hemoglobin : 11.4 g/dL  Hematocrit : 33.1 %  Platelet Count - Automated : 337 K/uL  Mean Cell Volume : 83.2 fl  Mean Cell Hemoglobin : 28.6 pg  Mean Cell Hemoglobin Concentration : 34.4 gm/dL    04-04    136  |  101  |  21  ----------------------------<  126<H>  4.2   |  21<L>  |  0.95    Ca    9.1      04 Apr 2024 07:16

## 2024-04-05 NOTE — PROGRESS NOTE ADULT - SUBJECTIVE AND OBJECTIVE BOX
DATE OF SERVICE: 04-05-24 @ 13:05    Patient is a 59y old  Male who presents with a chief complaint of Sent to the ER by PCP following concern for LEFT foot infection (05 Apr 2024 10:21)      INTERVAL HISTORY: Feels ok.     REVIEW OF SYSTEMS:  CONSTITUTIONAL: No weakness  EYES/ENT: No visual changes;  No throat pain   NECK: No pain or stiffness  RESPIRATORY: No cough, wheezing; No shortness of breath  CARDIOVASCULAR: No chest pain or palpitations  GASTROINTESTINAL: No abdominal  pain. No nausea, vomiting, or hematemesis  GENITOURINARY: No dysuria, frequency or hematuria  NEUROLOGICAL: No stroke like symptoms  SKIN: No rashes    	  MEDICATIONS:        PHYSICAL EXAM:  T(C): 36.7 (04-05-24 @ 11:27), Max: 37.1 (04-04-24 @ 21:04)  HR: 80 (04-05-24 @ 11:27) (75 - 82)  BP: 128/73 (04-05-24 @ 11:27) (128/73 - 135/78)  RR: 18 (04-05-24 @ 11:27) (18 - 18)  SpO2: 99% (04-05-24 @ 11:27) (97% - 99%)  Wt(kg): --  I&O's Summary    04 Apr 2024 07:01  -  05 Apr 2024 07:00  --------------------------------------------------------  IN: 0 mL / OUT: 950 mL / NET: -950 mL          Appearance: In no distress	  HEENT:    PERRL, EOMI	  Cardiovascular:  S1 S2, No JVD  Respiratory: Lungs clear to auscultation	  Gastrointestinal:  Soft, Non-tender, + BS	  Vascularature:  No edema of LE  Psychiatric: Appropriate affect   Neuro: no acute focal deficits                               11.1   9.88  )-----------( 330      ( 05 Apr 2024 12:02 )             32.0     04-05    136  |  100  |  24<H>  ----------------------------<  247<H>  4.2   |  24  |  0.98    Ca    9.2      05 Apr 2024 11:59  Phos  3.1     04-05  Mg     1.8     04-05          Labs personally reviewed  < from: TTE W or WO Ultrasound Enhancing Agent (04.03.24 @ 13:13) >   1. Left ventricular cavity is normal in size. Left ventricular wall thickness is normal. Left ventricular systolic function is normal with an ejection fraction of 69 % by Barraza's method of disks. There are no regional wall motion abnormalities seen.   2. Normal left ventricular diastolic function, with normal filling pressure.   3. Normal right ventricular cavity size, with normal wall thickness, and normal systolic function. Tricuspid annular plane systolic excursion (TAPSE) is 1.8 cm (normal >=1.7 cm).   4. The right atrium is normal in size.   5. Normal left and right atrial size.   6. No significant valvular disease.   7. No pericardial effusion seen.   8. Left ventricular global longitudinal strain is -21.3 % which is normal (< -18%). Images were acquired on a [employer] ultrasound system and processed using [employer] strain analysis software with a heart rate of 80 bpm and a blood pressure of 123/72 mmHg.   9. No prior echocardiogram is available for comparison.  10. There is normal LV mass andconcentric remodeling.      ASSESSMENT/PLAN: 	    This is a 59 year old male PMH: Colon ca, Tobacco dependance.  Admitted for left foot pain. + Ulcer      1. Preop Risk Stratification - TTE unremarkable with preserved EF  - EKG non ischemic   - Reports good functional capacity prior to diabetic foot ulcer  - Elevated risk for mod risk LE angio and pods surgery, no contraindication to proceed    2.   Diabetic foot ulcer  - MRI foot  suggestive of early osteomyelitis   - C/w local wound care per Pod  - Abx as per primary team  - POD following-> Plan for left foot 5th met head resection    3.  Dm2:  - HgbA1c 6.8    4 DVT ppx:  - Lovenox sq        Isabel Deleon, GALA-NP   Nuno Yeager DO [facility]  Cardiovascular Medicine  [address], [address]  Available through call or text on [employer]  Office: [phone]

## 2024-04-05 NOTE — PROGRESS NOTE ADULT - ASSESSMENT
59M with left foot submet 5 wound to bone and cellulitis.   - Pt was seen and evaluated.   - Afebrile, WBC 11.47.  - Left foot submet 5 wound to bone, fibronecrotic wound bed, tracking 1cm circumferentially, no malodor, no drainage, erythema to midfoot resolving.  - Left Foot X-Ray: No gas, no OM.  - Left Foot Wound Culture: Klebsiella oxytoca/Raoultella ornithinolytica, Strep mitis/oralis, Peptoniphilus harei, Staph pettenkoferi.  - DAMIEN/PVR: RABI 1.1, LABI 0.65, RTBI 0.97, LTBI 0.3.  - [facility] planning LLE angio with date TBD, appreciated.   - Left Foot MRI: 5th met head OM, 5th proximal phalanx base OM.  - Pod Plan: Re-booked for left foot 5th met head resection with graft application on Wed 4/11 at 7:30am with Dr. Call pending [facility] recs.  - Documented medical and cardiac clearance for podiatric surgery under anesthesia, appreciated.   - Discussed with attending.

## 2024-04-05 NOTE — PROGRESS NOTE ADULT - ASSESSMENT
EDA CHAWLA is a 59y Male active smoker with a hx of Stage IV rectal adenocarcinoma s/p colectomy with colostomy (1/2024) and T2DM c/b peripheral neuropathy who presented from PCP concerning for L foot infection, found to have L fifth metatarsal osteomyelitis. OR with podiatry pending vascular surgery.

## 2024-04-05 NOTE — PROGRESS NOTE ADULT - SUBJECTIVE AND OBJECTIVE BOX
[facility] DIVISION OF INFECTIOUS DISEASES  REGIS Khan Y. Patel, S. Shah, G. Casimir  [phone]  ([phone] - weekdays after 5pm and weekends)    Name: EDA CHAWLA  Age/Gender: 59y Male  MRN: [id number]    Interval History:  Patient seen and examined this morning.   Mild L foot discomfort, no other new complaints.   Notes reviewed. No concerning overnight events  Afebrile   Allergies: No Known Allergies      Objective:  Vitals:   T(F): 98 (04-05-24 @ 05:10), Max: 98.8 (04-04-24 @ 21:04)  HR: 75 (04-05-24 @ 05:10) (68 - 82)  BP: 131/81 (04-05-24 @ 05:10) (131/81 - 135/78)  RR: 18 (04-05-24 @ 05:10) (18 - 18)  SpO2: 97% (04-05-24 @ 05:10) (97% - 98%)  Physical Examination:  General: no acute distress, nontoxic appearing   HEENT: NC/AT, anicteric, neck supple  Respiratory: no acc muscle use, breathing comfortably  Cardiovascular: S1 and S2 present  Gastrointestinal: normal appearing, nondistended  Extremities: no edema, no cyanosis  Skin: no visible rash    Laboratory Studies:  CBC:                       11.4   11.47 )-----------( 337      ( 04 Apr 2024 07:17 )             33.1     WBC Trend:  11.47 04-04-24 @ 07:17  12.14 04-03-24 @ 07:17  14.12 04-02-24 @ 17:43    CMP: 04-04    136  |  101  |  21  ----------------------------<  126<H>  4.2   |  21<L>  |  0.95    Ca    9.1      04 Apr 2024 07:16      Creatinine: 0.95 mg/dL (04-04-24 @ 07:16)  Creatinine: 1.12 mg/dL (04-03-24 @ 07:17)  Creatinine: 1.18 mg/dL (04-02-24 @ 17:43)    Vancomycin Level, Trough: 8.5 ug/mL (04-04-24 @ 04:44)    Microbiology: reviewed   Culture - Abscess with Gram Stain (collected 04-02-24 @ 17:40)  Source: .Abscess left foot  Gram Stain (04-03-24 @ 05:56):    No polymorphonuclear leukocytes seen per low power field    Numerous Gram positive cocci in pairs seen per oil power field  Final Report (04-04-24 @ 23:56):    Few Klebsiella oxytoca/Raoultella ornithinolytica    Few Streptococcus mitis/oralis group "Susceptibilities not performed"    Moderate Peptoniphilus harei group "Susceptibilities not performed"    Rare Staphylococcus pettenkoferi "Susceptibilities not performed"  Organism: Klebsiella oxytoca /Raoutella ornithinolytica (04-04-24 @ 23:56)  Organism: Klebsiella oxytoca /Raoutella ornithinolytica (04-04-24 @ 23:56)      Method Type: TARIQ      -  Amoxicillin/Clavulanic Acid: S <=8/4      -  Ampicillin: R >16 These ampicillin results predict results for amoxicillin      -  Ampicillin/Sulbactam: S 8/4      -  Aztreonam: S <=4      -  Cefazolin: R >16      -  Cefepime: S <=2      -  Cefoxitin: S <=8      -  Ceftriaxone: S <=1      -  Ciprofloxacin: S <=0.25      -  Ertapenem: S <=0.5      -  Gentamicin: S <=2      -  Imipenem: S <=1      -  Levofloxacin: S <=0.5      -  Meropenem: S <=1      -  Piperacillin/Tazobactam: S <=8      -  Tobramycin: S <=2      -  Trimethoprim/Sulfamethoxazole: S <=0.5/9.5    Culture - Blood (collected 04-02-24 @ 17:24)  Source: .Blood Blood-Peripheral  Preliminary Report (04-04-24 @ 23:01):    No growth at 48 Hours    Culture - Blood (collected 04-02-24 @ 17:17)  Source: .Blood Blood-Peripheral  Preliminary Report (04-04-24 @ 23:01):    No growth at 48 Hours    Radiology: reviewed   < from: Xray Chest 1 View- PORTABLE-Urgent (Xray Chest 1 View- PORTABLE-Urgent .) (04.03.24 @ 17:22) >  IMPRESSION:  No focal consolidation.    < end of copied text >  < from: VA Physiol Extremity Lower 3+ Level, BI (04.03.24 @ 13:11) >  IMPRESSION: Moderate arterial flow limitation in the left lower extremity   localizing to the iliofemoral distribution. Consider lower extremity   arterial duplex study for further evaluation.    < end of copied text >    Medications:  acetaminophen     Tablet .. 650 milliGRAM(s) Oral every 6 hours PRN  albuterol    90 MICROgram(s) HFA Inhaler 2 Puff(s) Inhalation every 6 hours PRN  cefepime   IVPB 2000 milliGRAM(s) IV Intermittent every 8 hours  chlorhexidine 2% Cloths 1 Application(s) Topical <User Schedule>  dextrose 5%. 1000 milliLiter(s) IV Continuous <Continuous>  dextrose 5%. 1000 milliLiter(s) IV Continuous <Continuous>  dextrose 50% Injectable 25 Gram(s) IV Push once  dextrose 50% Injectable 12.5 Gram(s) IV Push once  dextrose 50% Injectable 25 Gram(s) IV Push once  dextrose Oral Gel 15 Gram(s) Oral once PRN  enoxaparin Injectable 40 milliGRAM(s) SubCutaneous every 24 hours  glucagon  Injectable 1 milliGRAM(s) IntraMuscular once  influenza   Vaccine 0.5 milliLiter(s) IntraMuscular once  insulin lispro (ADMELOG) corrective regimen sliding scale   SubCutaneous three times a day before meals  insulin lispro (ADMELOG) corrective regimen sliding scale   SubCutaneous at bedtime    Current Antimicrobials:  cefepime   IVPB 2000 milliGRAM(s) IV Intermittent every 8 hours    Prior/Completed Antimicrobials:  cefepime   IVPB  vancomycin  IVPB.

## 2024-04-06 LAB
ANION GAP SERPL CALC-SCNC: 13 MMOL/L — SIGNIFICANT CHANGE UP (ref 5–17)
BUN SERPL-MCNC: 23 MG/DL — SIGNIFICANT CHANGE UP (ref 7–23)
CALCIUM SERPL-MCNC: 9.4 MG/DL — SIGNIFICANT CHANGE UP (ref 8.4–10.5)
CHLORIDE SERPL-SCNC: 102 MMOL/L — SIGNIFICANT CHANGE UP (ref 96–108)
CO2 SERPL-SCNC: 22 MMOL/L — SIGNIFICANT CHANGE UP (ref 22–31)
CREAT SERPL-MCNC: 1.11 MG/DL — SIGNIFICANT CHANGE UP (ref 0.5–1.3)
EGFR: 77 ML/MIN/1.73M2 — SIGNIFICANT CHANGE UP
GLUCOSE BLDC GLUCOMTR-MCNC: 156 MG/DL — HIGH (ref 70–99)
GLUCOSE BLDC GLUCOMTR-MCNC: 161 MG/DL — HIGH (ref 70–99)
GLUCOSE BLDC GLUCOMTR-MCNC: 169 MG/DL — HIGH (ref 70–99)
GLUCOSE BLDC GLUCOMTR-MCNC: 218 MG/DL — HIGH (ref 70–99)
GLUCOSE SERPL-MCNC: 153 MG/DL — HIGH (ref 70–99)
HCT VFR BLD CALC: 33.8 % — LOW (ref 39–50)
HGB BLD-MCNC: 11.6 G/DL — LOW (ref 13–17)
MAGNESIUM SERPL-MCNC: 2 MG/DL — SIGNIFICANT CHANGE UP (ref 1.6–2.6)
MCHC RBC-ENTMCNC: 28.4 PG — SIGNIFICANT CHANGE UP (ref 27–34)
MCHC RBC-ENTMCNC: 34.3 GM/DL — SIGNIFICANT CHANGE UP (ref 32–36)
MCV RBC AUTO: 82.6 FL — SIGNIFICANT CHANGE UP (ref 80–100)
NRBC # BLD: 0 /100 WBCS — SIGNIFICANT CHANGE UP (ref 0–0)
PHOSPHATE SERPL-MCNC: 3.7 MG/DL — SIGNIFICANT CHANGE UP (ref 2.5–4.5)
PLATELET # BLD AUTO: 382 K/UL — SIGNIFICANT CHANGE UP (ref 150–400)
POTASSIUM SERPL-MCNC: 4.2 MMOL/L — SIGNIFICANT CHANGE UP (ref 3.5–5.3)
POTASSIUM SERPL-SCNC: 4.2 MMOL/L — SIGNIFICANT CHANGE UP (ref 3.5–5.3)
RBC # BLD: 4.09 M/UL — LOW (ref 4.2–5.8)
RBC # FLD: 11.9 % — SIGNIFICANT CHANGE UP (ref 10.3–14.5)
SODIUM SERPL-SCNC: 137 MMOL/L — SIGNIFICANT CHANGE UP (ref 135–145)
WBC # BLD: 10.57 K/UL — HIGH (ref 3.8–10.5)
WBC # FLD AUTO: 10.57 K/UL — HIGH (ref 3.8–10.5)

## 2024-04-06 RX ADMIN — PIPERACILLIN AND TAZOBACTAM 25 GRAM(S): 4; .5 INJECTION, POWDER, LYOPHILIZED, FOR SOLUTION INTRAVENOUS at 22:25

## 2024-04-06 RX ADMIN — ENOXAPARIN SODIUM 40 MILLIGRAM(S): 100 INJECTION SUBCUTANEOUS at 22:24

## 2024-04-06 RX ADMIN — CHLORHEXIDINE GLUCONATE 1 APPLICATION(S): 213 SOLUTION TOPICAL at 06:17

## 2024-04-06 RX ADMIN — Medication 1: at 08:57

## 2024-04-06 RX ADMIN — Medication 2: at 13:07

## 2024-04-06 RX ADMIN — PIPERACILLIN AND TAZOBACTAM 25 GRAM(S): 4; .5 INJECTION, POWDER, LYOPHILIZED, FOR SOLUTION INTRAVENOUS at 13:07

## 2024-04-06 RX ADMIN — PIPERACILLIN AND TAZOBACTAM 25 GRAM(S): 4; .5 INJECTION, POWDER, LYOPHILIZED, FOR SOLUTION INTRAVENOUS at 05:58

## 2024-04-06 NOTE — PROGRESS NOTE ADULT - ASSESSMENT
Mr. Menon is a 59 year old male active smoker with PMHx of  peripheral neuropathy, Type II DM,  anemia,  status post colectomy with colostomy 01/2024  due to rectal cancer under the care of Dr. Renny Purvis now admitted with osteomyelitis of the left foot.  MRI foot shows Findings suggestive of early osteomyelitis at the fifth metatarsal head and base of the proximal phalanx, deep to the soft tissue wound. Focal osteitis is also a differential consideration for the osseous findings. Podiatry pending resection with graft. Infectious disease consulted.    Labs show hemoglobin 11.6, hematocrit  34.7, MCV 83.6 platelet count 339, WBC 12.14 with neutrophilic predominance. Creatinine 1.12 with normal liver function.  HbA1c 6.8.  TTE with EF 69%.     Patient last seen in Hematology Oncology Clinic 04/02/2024  and oxaliplatin was discontinued due to peripheral neuropathy. Podiatry scheduled pt for left foot fifth metatarsal head resection with wound debridement and graft application 04/04/2024       # Stage IV Rectal Adenocarcinoma   - Diagnosed 08/2023  - Invasive, moderately differentiated colonic adenocarcinoma  - Low probability of MSI-high  - HER-2-Mary Negative  - KRAS is Wild Type  - Metastatic to liver  - FOLFOX and weekly Cetuximab started 9/18/23  - PET-CT from 03/20/2024 shows that the patient is status post surgery with no abnormal FDG uptake in the pelvis postsurgical area.  More prominent uptake in the gallbladder fossa (early recurrence versus normal fluctuation of uptake ) and no other FDG avid lesion in the field-of-view.  Previously noted liver Mets are not presently seen.  - Was scheduled to receive 5FU, Leucovorin and Cetuximab 04/02/2024, therapy held due to concern for L foot infection and pt sent to Podiatry   - Plan to continue therapy once able to from infection standpoint, pt is aware     # Left Foot Osteomyelitis  # PVD  - Podiatry scheduled pt for left foot fifth metatarsal head resection with wound debridement and graft application, delayed  - Vascular surgery DAMIEN suggestive of moderate arterial flow limitation in the left lower extremity localizing to the iliofemoral distribution, pending angiogram possible angioplasty and stent as required.  - Antibiotics per ID and primary team     # Neutrophilia   - Due to infection  - Outpatient labs 04/02/2024 WBC 16.19 ANC 14,170  - Continue to monitor, improving     # Normocytic anemia  - Likely multifactorial, active infection, cancer, chemotherapy, dilutional   - Outpatient labs 04/02/2024 Hb 13.7, MCV 83.5   - Continue to monitor  - Transfuse to maintain Hb > 7     # Nicotine dependence, active cigarette smoker  - Counseling provided, pt aware of possible compromised wound healing       Thank you for allowing me to participate in the care of Mr. Menon, please do not hesitate to call or text me if you have further questions or concerns.     Gato Horvath MD  [facility]   Division of Hematology/Oncology  [address] [place] [address], [address]  [place] [address]  P: [phone]  F: [phone]    Attestation:    ----Pt evaluated including face-to-face interaction in addition to chart review, reviewing treatment plan, and managing the patient’s chronic diagnoses as listed in the assessment---- Mr. Menon is a 59 year old male active smoker with PMHx of  peripheral neuropathy, Type II DM,  anemia,  status post colectomy with colostomy 01/2024  due to rectal cancer under the care of Dr. Renny Purvis now admitted with osteomyelitis of the left foot.  MRI foot shows Findings suggestive of early osteomyelitis at the fifth metatarsal head and base of the proximal phalanx, deep to the soft tissue wound. Focal osteitis is also a differential consideration for the osseous findings. Podiatry pending resection with graft. Infectious disease consulted.    Labs show hemoglobin 11.6, hematocrit  34.7, MCV 83.6 platelet count 339, WBC 12.14 with neutrophilic predominance. Creatinine 1.12 with normal liver function.  HbA1c 6.8.  TTE with EF 69%.     Patient last seen in Hematology Oncology Clinic 04/02/2024  and oxaliplatin was discontinued due to peripheral neuropathy. Podiatry scheduled pt for left foot fifth metatarsal head resection with wound debridement and graft application 04/04/2024       # Stage IV Rectal Adenocarcinoma   - Diagnosed 08/2023  - Invasive, moderately differentiated colonic adenocarcinoma  - Low probability of MSI-high  - HER-2-Mary Negative  - KRAS is Wild Type  - Metastatic to liver  - FOLFOX and weekly Cetuximab started 9/18/23  - PET-CT from 03/20/2024 shows that the patient is status post surgery with no abnormal FDG uptake in the pelvis postsurgical area.  More prominent uptake in the gallbladder fossa (early recurrence versus normal fluctuation of uptake ) and no other FDG avid lesion in the field-of-view.  Previously noted liver Mets are not presently seen.  - Was scheduled to receive 5FU, Leucovorin and Cetuximab 04/02/2024, therapy held due to concern for L foot infection and pt sent to Podiatry   - Plan to continue therapy once able to from infection standpoint, pt is aware     # Left Foot Osteomyelitis  # PVD  - Podiatry scheduled pt for left foot fifth metatarsal head resection with wound debridement and graft application, delayed  - Vascular surgery DAMIEN suggestive of moderate arterial flow limitation in the left lower extremity localizing to the iliofemoral distribution, pending angiogram possible angioplasty and stent as required.\  --DAMIEN LLE with 0.65 indicating moderate lower extremity arterial vascular disease.  - Antibiotics per ID and primary team     # Neutrophilia   - Due to infection  - Outpatient labs 04/02/2024 WBC 16.19 ANC 14,170  - Continue to monitor, improving     # Normocytic anemia  - Likely multifactorial, active infection, cancer, chemotherapy, dilutional   - Outpatient labs 04/02/2024 Hb 13.7, MCV 83.5   - Continue to monitor  - Transfuse to maintain Hb > 7     # Nicotine dependence, active cigarette smoker  - Counseling provided, pt aware of possible compromised wound healing       Thank you for allowing me to participate in the care of Mr. Menon, please do not hesitate to call or text me if you have further questions or concerns.     Gato Horvath MD  [facility]   Division of Hematology/Oncology  [address] [place] [address], [address]  [place] [address]  P: [phone]  F: [phone]    Attestation:    ----Pt evaluated including face-to-face interaction in addition to chart review, reviewing treatment plan, and managing the patient’s chronic diagnoses as listed in the assessment----

## 2024-04-06 NOTE — PROGRESS NOTE ADULT - SUBJECTIVE AND OBJECTIVE BOX
DATE OF SERVICE: 04-06-24 @ 14:16    Patient is a 59y old  Male who presents with a chief complaint of Sent to the ER by PCP following concern for LEFT foot infection (06 Apr 2024 07:31)      INTERVAL HISTORY:     REVIEW OF SYSTEMS:  CONSTITUTIONAL: No weakness  EYES/ENT: No visual changes;  No throat pain   NECK: No pain or stiffness  RESPIRATORY: No cough, wheezing; No shortness of breath  CARDIOVASCULAR: No chest pain or palpitations  GASTROINTESTINAL: No abdominal  pain. No nausea, vomiting, or hematemesis  GENITOURINARY: No dysuria, frequency or hematuria  NEUROLOGICAL: No stroke like symptoms  SKIN: No rashes      	  MEDICATIONS:        PHYSICAL EXAM:  T(C): 36.8 (04-06-24 @ 05:17), Max: 37.6 (04-05-24 @ 21:40)  HR: 77 (04-06-24 @ 05:17) (72 - 80)  BP: 129/74 (04-06-24 @ 05:17) (124/72 - 129/74)  RR: 18 (04-06-24 @ 05:17) (18 - 20)  SpO2: 98% (04-06-24 @ 05:17) (97% - 98%)  Wt(kg): --  I&O's Summary    05 Apr 2024 07:01  -  06 Apr 2024 07:00  --------------------------------------------------------  IN: 200 mL / OUT: 600 mL / NET: -400 mL          Appearance: In no distress	  HEENT:    PERRL, EOMI	  Cardiovascular:  S1 S2, No JVD  Respiratory: Lungs clear to auscultation	  Gastrointestinal:  Soft, Non-tender, + BS	  Vascularature:  No edema of LE  Psychiatric: Appropriate affect   Neuro: no acute focal deficits                               11.6   10.57 )-----------( 382      ( 06 Apr 2024 07:28 )             33.8     04-06    137  |  102  |  23  ----------------------------<  153<H>  4.2   |  22  |  1.11    Ca    9.4      06 Apr 2024 07:28  Phos  3.7     04-06  Mg     2.0     04-06          Labs personally reviewed      ASSESSMENT/PLAN: 	  This is a 59 year old male PMH: Colon ca, Tobacco dependance.  Admitted for left foot pain. + Ulcer      1. Preop Risk Stratification - TTE unremarkable with preserved EF  - EKG non ischemic   - Reports good functional capacity prior to diabetic foot ulcer  - Elevated risk for mod risk LE angio and pods surgery, no contraindication to proceed    2.   Diabetic foot ulcer  - MRI foot  suggestive of early osteomyelitis   - C/w local wound care per Pod  - Abx as per primary team  - POD following-> Plan for left foot 5th met head resection    3.  Dm2:  - HgbA1c 6.8    4 DVT ppx:  - Lovenox sq          Iolani Behrbom, AG-NP   Nuno Yeager DO [facility]  Cardiovascular Medicine  [address] [place] [address], [address]  Available through call or text on [employer]  Office: [phone]

## 2024-04-06 NOTE — PROGRESS NOTE ADULT - SUBJECTIVE AND OBJECTIVE BOX
Didi Kahn MD   Internal Medicine, PGY 2  Contact via TEAMS.     SUBJECTIVE / OVERNIGHT EVENTS:  - Pt seen and examined at bedside  - BALJEET    MEDICATIONS  (STANDING):  chlorhexidine 2% Cloths 1 Application(s) Topical <User Schedule>  dextrose 5%. 1000 milliLiter(s) (50 mL/Hr) IV Continuous <Continuous>  dextrose 5%. 1000 milliLiter(s) (100 mL/Hr) IV Continuous <Continuous>  dextrose 50% Injectable 25 Gram(s) IV Push once  dextrose 50% Injectable 12.5 Gram(s) IV Push once  dextrose 50% Injectable 25 Gram(s) IV Push once  enoxaparin Injectable 40 milliGRAM(s) SubCutaneous every 24 hours  glucagon  Injectable 1 milliGRAM(s) IntraMuscular once  influenza   Vaccine 0.5 milliLiter(s) IntraMuscular once  insulin lispro (ADMELOG) corrective regimen sliding scale   SubCutaneous three times a day before meals  insulin lispro (ADMELOG) corrective regimen sliding scale   SubCutaneous at bedtime  piperacillin/tazobactam IVPB.. 3.375 Gram(s) IV Intermittent every 8 hours    MEDICATIONS  (PRN):  acetaminophen     Tablet .. 650 milliGRAM(s) Oral every 6 hours PRN Temp greater or equal to 38C (100.4F), Mild Pain (1 - 3)  albuterol    90 MICROgram(s) HFA Inhaler 2 Puff(s) Inhalation every 6 hours PRN Bronchospasm  dextrose Oral Gel 15 Gram(s) Oral once PRN Blood Glucose LESS THAN 70 milliGRAM(s)/deciliter        04-05-24 @ 07:01  -  04-06-24 @ 07:00  --------------------------------------------------------  IN: 200 mL / OUT: 600 mL / NET: -400 mL        PHYSICAL EXAM:  Vital Signs Last 24 Hrs  T(C): 36.8 (06 Apr 2024 05:17), Max: 37.6 (05 Apr 2024 21:40)  T(F): 98.3 (06 Apr 2024 05:17), Max: 99.6 (05 Apr 2024 21:40)  HR: 77 (06 Apr 2024 05:17) (72 - 80)  BP: 129/74 (06 Apr 2024 05:17) (124/72 - 129/74)  BP(mean): 91 (05 Apr 2024 11:27) (91 - 91)  RR: 18 (06 Apr 2024 05:17) (18 - 20)  SpO2: 98% (06 Apr 2024 05:17) (97% - 99%)    Parameters below as of 06 Apr 2024 05:17  Patient On (Oxygen Delivery Method): room air        CAPILLARY BLOOD GLUCOSE      POCT Blood Glucose.: 249 mg/dL (05 Apr 2024 21:38)  POCT Blood Glucose.: 144 mg/dL (05 Apr 2024 17:26)  POCT Blood Glucose.: 248 mg/dL (05 Apr 2024 13:35)  POCT Blood Glucose.: 239 mg/dL (05 Apr 2024 13:31)  POCT Blood Glucose.: 366 mg/dL (05 Apr 2024 13:15)  POCT Blood Glucose.: 137 mg/dL (05 Apr 2024 08:53)    I&O's Summary    05 Apr 2024 07:01  -  06 Apr 2024 07:00  --------------------------------------------------------  IN: 200 mL / OUT: 600 mL / NET: -400 mL        CONSTITUTIONAL: NAD  HEENT: NC/AT  RESPIRATORY: Normal respiratory effort; lungs are clear to auscultation bilaterally  CARDIOVASCULAR: Regular rate and rhythm, normal S1 and S2, no murmur/rub/gallop; No lower extremity edema; Peripheral pulses are 2+ bilaterally  ABDOMEN: Nontender to palpation, normoactive bowel sounds, no rebound/guarding; No hepatosplenomegaly  MUSCLOSKELETAL: no clubbing or cyanosis of digits; no joint swelling or tenderness to palpation  EXTREMITIES: no peripheral edema, distal pulses intact   NEURO: no focal deficits   PSYCH: A+O to person, place, and time; affect appropriate    LABS:                        11.1   9.88  )-----------( 330      ( 05 Apr 2024 12:02 )             32.0     04-05    136  |  100  |  24<H>  ----------------------------<  247<H>  4.2   |  24  |  0.98    Ca    9.2      05 Apr 2024 11:59  Phos  3.1     04-05  Mg     1.8     04-05            Urinalysis Basic - ( 05 Apr 2024 11:59 )    Color: x / Appearance: x / SG: x / pH: x  Gluc: 247 mg/dL / Ketone: x  / Bili: x / Urobili: x   Blood: x / Protein: x / Nitrite: x   Leuk Esterase: x / RBC: x / WBC x   Sq Epi: x / Non Sq Epi: x / Bacteria: x          IMAGING:    [X] All pertinent imaging reviewed by me Didi Kahn MD   Internal Medicine, PGY 2  Contact via TEAMS.     SUBJECTIVE / OVERNIGHT EVENTS:  - Pt seen and examined at bedside  - BALJEET    MEDICATIONS  (STANDING):  chlorhexidine 2% Cloths 1 Application(s) Topical <User Schedule>  dextrose 5%. 1000 milliLiter(s) (50 mL/Hr) IV Continuous <Continuous>  dextrose 5%. 1000 milliLiter(s) (100 mL/Hr) IV Continuous <Continuous>  dextrose 50% Injectable 25 Gram(s) IV Push once  dextrose 50% Injectable 12.5 Gram(s) IV Push once  dextrose 50% Injectable 25 Gram(s) IV Push once  enoxaparin Injectable 40 milliGRAM(s) SubCutaneous every 24 hours  glucagon  Injectable 1 milliGRAM(s) IntraMuscular once  influenza   Vaccine 0.5 milliLiter(s) IntraMuscular once  insulin lispro (ADMELOG) corrective regimen sliding scale   SubCutaneous three times a day before meals  insulin lispro (ADMELOG) corrective regimen sliding scale   SubCutaneous at bedtime  piperacillin/tazobactam IVPB.. 3.375 Gram(s) IV Intermittent every 8 hours    MEDICATIONS  (PRN):  acetaminophen     Tablet .. 650 milliGRAM(s) Oral every 6 hours PRN Temp greater or equal to 38C (100.4F), Mild Pain (1 - 3)  albuterol    90 MICROgram(s) HFA Inhaler 2 Puff(s) Inhalation every 6 hours PRN Bronchospasm  dextrose Oral Gel 15 Gram(s) Oral once PRN Blood Glucose LESS THAN 70 milliGRAM(s)/deciliter        04-05-24 @ 07:01  -  04-06-24 @ 07:00  --------------------------------------------------------  IN: 200 mL / OUT: 600 mL / NET: -400 mL        PHYSICAL EXAM:  Vital Signs Last 24 Hrs  T(C): 36.8 (06 Apr 2024 05:17), Max: 37.6 (05 Apr 2024 21:40)  T(F): 98.3 (06 Apr 2024 05:17), Max: 99.6 (05 Apr 2024 21:40)  HR: 77 (06 Apr 2024 05:17) (72 - 80)  BP: 129/74 (06 Apr 2024 05:17) (124/72 - 129/74)  BP(mean): 91 (05 Apr 2024 11:27) (91 - 91)  RR: 18 (06 Apr 2024 05:17) (18 - 20)  SpO2: 98% (06 Apr 2024 05:17) (97% - 99%)    Parameters below as of 06 Apr 2024 05:17  Patient On (Oxygen Delivery Method): room air        CAPILLARY BLOOD GLUCOSE      POCT Blood Glucose.: 249 mg/dL (05 Apr 2024 21:38)  POCT Blood Glucose.: 144 mg/dL (05 Apr 2024 17:26)  POCT Blood Glucose.: 248 mg/dL (05 Apr 2024 13:35)  POCT Blood Glucose.: 239 mg/dL (05 Apr 2024 13:31)  POCT Blood Glucose.: 366 mg/dL (05 Apr 2024 13:15)  POCT Blood Glucose.: 137 mg/dL (05 Apr 2024 08:53)    I&O's Summary    05 Apr 2024 07:01  -  06 Apr 2024 07:00  --------------------------------------------------------  IN: 200 mL / OUT: 600 mL / NET: -400 mL        CONSTITUTIONAL: NAD  HEENT: NC/AT  RESPIRATORY: Normal respiratory effort; lungs are clear to auscultation bilaterally  CARDIOVASCULAR: Regular rate and rhythm, normal S1 and S2, no murmur/rub/gallop; No lower extremity edema; Peripheral pulses are 2+ bilaterally  ABDOMEN: Nontender to palpation, normoactive bowel sounds, no rebound/guarding; No hepatosplenomegaly  MUSCLOSKELETAL: no clubbing or cyanosis of digits; no joint swelling or tenderness to palpation  EXTREMITIES: L foot wrapped, no discharge noted   NEURO: no focal deficits   PSYCH: A+O to person, place, and time; affect appropriate    LABS:                        11.1   9.88  )-----------( 330      ( 05 Apr 2024 12:02 )             32.0     04-05    136  |  100  |  24<H>  ----------------------------<  247<H>  4.2   |  24  |  0.98    Ca    9.2      05 Apr 2024 11:59  Phos  3.1     04-05  Mg     1.8     04-05            Urinalysis Basic - ( 05 Apr 2024 11:59 )    Color: x / Appearance: x / SG: x / pH: x  Gluc: 247 mg/dL / Ketone: x  / Bili: x / Urobili: x   Blood: x / Protein: x / Nitrite: x   Leuk Esterase: x / RBC: x / WBC x   Sq Epi: x / Non Sq Epi: x / Bacteria: x          IMAGING:    [X] All pertinent imaging reviewed by me

## 2024-04-06 NOTE — PROGRESS NOTE ADULT - SUBJECTIVE AND OBJECTIVE BOX
[facility] HEMATOLOGY/ONCOLOGY INPATIENT PROGRESS NOTE     Interval Hx:   04-06-24: Mr. Menon was seen at bedside today.    Meds:   MEDICATIONS  (STANDING):  chlorhexidine 2% Cloths 1 Application(s) Topical <User Schedule>  dextrose 5%. 1000 milliLiter(s) (100 mL/Hr) IV Continuous <Continuous>  dextrose 5%. 1000 milliLiter(s) (50 mL/Hr) IV Continuous <Continuous>  dextrose 50% Injectable 25 Gram(s) IV Push once  dextrose 50% Injectable 12.5 Gram(s) IV Push once  dextrose 50% Injectable 25 Gram(s) IV Push once  enoxaparin Injectable 40 milliGRAM(s) SubCutaneous every 24 hours  glucagon  Injectable 1 milliGRAM(s) IntraMuscular once  influenza   Vaccine 0.5 milliLiter(s) IntraMuscular once  insulin lispro (ADMELOG) corrective regimen sliding scale   SubCutaneous three times a day before meals  insulin lispro (ADMELOG) corrective regimen sliding scale   SubCutaneous at bedtime  piperacillin/tazobactam IVPB.. 3.375 Gram(s) IV Intermittent every 8 hours    MEDICATIONS  (PRN):  acetaminophen     Tablet .. 650 milliGRAM(s) Oral every 6 hours PRN Temp greater or equal to 38C (100.4F), Mild Pain (1 - 3)  albuterol    90 MICROgram(s) HFA Inhaler 2 Puff(s) Inhalation every 6 hours PRN Bronchospasm  dextrose Oral Gel 15 Gram(s) Oral once PRN Blood Glucose LESS THAN 70 milliGRAM(s)/deciliter    Vital Signs Last 24 Hrs  T(C): 36.8 (06 Apr 2024 05:17), Max: 37.6 (05 Apr 2024 21:40)  T(F): 98.3 (06 Apr 2024 05:17), Max: 99.6 (05 Apr 2024 21:40)  HR: 77 (06 Apr 2024 05:17) (72 - 80)  BP: 129/74 (06 Apr 2024 05:17) (124/72 - 129/74)  BP(mean): 91 (05 Apr 2024 11:27) (91 - 91)  RR: 18 (06 Apr 2024 05:17) (18 - 20)  SpO2: 98% (06 Apr 2024 05:17) (97% - 99%)    Parameters below as of 06 Apr 2024 05:17  Patient On (Oxygen Delivery Method): room air    Physical Exam:  Gen: NAD  HEENT: EOMI, MMM  Chest: equal chest rise, speaking full sentences   Cardiac: RR  Abd: Nondistended  Ext: LLE/foot cellulitis, bandaged with betadine type solution   Neuro: AAOx3, normal mood and affect     Labs:                        11.1   9.88  )-----------( 330      ( 05 Apr 2024 12:02 )             32.0     CBC Full  -  ( 05 Apr 2024 12:02 )  WBC Count : 9.88 K/uL  RBC Count : 3.90 M/uL  Hemoglobin : 11.1 g/dL  Hematocrit : 32.0 %  Platelet Count - Automated : 330 K/uL  Mean Cell Volume : 82.1 fl  Mean Cell Hemoglobin : 28.5 pg  Mean Cell Hemoglobin Concentration : 34.7 gm/dL    04-05    136  |  100  |  24<H>  ----------------------------<  247<H>  4.2   |  24  |  0.98    Ca    9.2      05 Apr 2024 11:59  Phos  3.1     04-05  Mg     1.8     04-05         [facility] HEMATOLOGY/ONCOLOGY INPATIENT PROGRESS NOTE     Interval Hx:   04-06-24: Mr. Menon was seen at bedside today, doing well no acute complaints, pending vascular intervention early next week with subsequent podiatric intervention thereafter for LLE findings, noted DAMIEN LLE with 0.65 indicating moderate lower extremity arterial vascular   disease.    The left superficial femoral artery is thrombosed in the distal thigh.  There is a tardus parvus pattern of reduced arterial flow distal to this   obstruction.    There is a flow-limiting stenosis of the left posterior tibial artery in   the proximal calf.    Meds:   MEDICATIONS  (STANDING):  chlorhexidine 2% Cloths 1 Application(s) Topical <User Schedule>  dextrose 5%. 1000 milliLiter(s) (100 mL/Hr) IV Continuous <Continuous>  dextrose 5%. 1000 milliLiter(s) (50 mL/Hr) IV Continuous <Continuous>  dextrose 50% Injectable 25 Gram(s) IV Push once  dextrose 50% Injectable 12.5 Gram(s) IV Push once  dextrose 50% Injectable 25 Gram(s) IV Push once  enoxaparin Injectable 40 milliGRAM(s) SubCutaneous every 24 hours  glucagon  Injectable 1 milliGRAM(s) IntraMuscular once  influenza   Vaccine 0.5 milliLiter(s) IntraMuscular once  insulin lispro (ADMELOG) corrective regimen sliding scale   SubCutaneous three times a day before meals  insulin lispro (ADMELOG) corrective regimen sliding scale   SubCutaneous at bedtime  piperacillin/tazobactam IVPB.. 3.375 Gram(s) IV Intermittent every 8 hours    MEDICATIONS  (PRN):  acetaminophen     Tablet .. 650 milliGRAM(s) Oral every 6 hours PRN Temp greater or equal to 38C (100.4F), Mild Pain (1 - 3)  albuterol    90 MICROgram(s) HFA Inhaler 2 Puff(s) Inhalation every 6 hours PRN Bronchospasm  dextrose Oral Gel 15 Gram(s) Oral once PRN Blood Glucose LESS THAN 70 milliGRAM(s)/deciliter    Vital Signs Last 24 Hrs  T(C): 36.8 (06 Apr 2024 05:17), Max: 37.6 (05 Apr 2024 21:40)  T(F): 98.3 (06 Apr 2024 05:17), Max: 99.6 (05 Apr 2024 21:40)  HR: 77 (06 Apr 2024 05:17) (72 - 80)  BP: 129/74 (06 Apr 2024 05:17) (124/72 - 129/74)  BP(mean): 91 (05 Apr 2024 11:27) (91 - 91)  RR: 18 (06 Apr 2024 05:17) (18 - 20)  SpO2: 98% (06 Apr 2024 05:17) (97% - 99%)    Parameters below as of 06 Apr 2024 05:17  Patient On (Oxygen Delivery Method): room air    Physical Exam:  Gen: NAD  HEENT: EOMI, MMM  Chest: equal chest rise, speaking full sentences   Cardiac: RR  Abd: Nondistended  Ext: LLE/foot cellulitis, bandaged with betadine type solution   Neuro: AAOx3, normal mood and affect     Labs:                        11.1   9.88  )-----------( 330      ( 05 Apr 2024 12:02 )             32.0     CBC Full  -  ( 05 Apr 2024 12:02 )  WBC Count : 9.88 K/uL  RBC Count : 3.90 M/uL  Hemoglobin : 11.1 g/dL  Hematocrit : 32.0 %  Platelet Count - Automated : 330 K/uL  Mean Cell Volume : 82.1 fl  Mean Cell Hemoglobin : 28.5 pg  Mean Cell Hemoglobin Concentration : 34.7 gm/dL    04-05    136  |  100  |  24<H>  ----------------------------<  247<H>  4.2   |  24  |  0.98    Ca    9.2      05 Apr 2024 11:59  Phos  3.1     04-05  Mg     1.8     04-05

## 2024-04-07 LAB
ANION GAP SERPL CALC-SCNC: 14 MMOL/L — SIGNIFICANT CHANGE UP (ref 5–17)
BUN SERPL-MCNC: 21 MG/DL — SIGNIFICANT CHANGE UP (ref 7–23)
CALCIUM SERPL-MCNC: 9.3 MG/DL — SIGNIFICANT CHANGE UP (ref 8.4–10.5)
CHLORIDE SERPL-SCNC: 101 MMOL/L — SIGNIFICANT CHANGE UP (ref 96–108)
CO2 SERPL-SCNC: 24 MMOL/L — SIGNIFICANT CHANGE UP (ref 22–31)
CREAT SERPL-MCNC: 1.09 MG/DL — SIGNIFICANT CHANGE UP (ref 0.5–1.3)
CULTURE RESULTS: SIGNIFICANT CHANGE UP
CULTURE RESULTS: SIGNIFICANT CHANGE UP
EGFR: 79 ML/MIN/1.73M2 — SIGNIFICANT CHANGE UP
GLUCOSE BLDC GLUCOMTR-MCNC: 179 MG/DL — HIGH (ref 70–99)
GLUCOSE BLDC GLUCOMTR-MCNC: 185 MG/DL — HIGH (ref 70–99)
GLUCOSE BLDC GLUCOMTR-MCNC: 197 MG/DL — HIGH (ref 70–99)
GLUCOSE BLDC GLUCOMTR-MCNC: 234 MG/DL — HIGH (ref 70–99)
GLUCOSE SERPL-MCNC: 182 MG/DL — HIGH (ref 70–99)
HCT VFR BLD CALC: 33.8 % — LOW (ref 39–50)
HGB BLD-MCNC: 11.3 G/DL — LOW (ref 13–17)
MAGNESIUM SERPL-MCNC: 1.9 MG/DL — SIGNIFICANT CHANGE UP (ref 1.6–2.6)
MCHC RBC-ENTMCNC: 27.9 PG — SIGNIFICANT CHANGE UP (ref 27–34)
MCHC RBC-ENTMCNC: 33.4 GM/DL — SIGNIFICANT CHANGE UP (ref 32–36)
MCV RBC AUTO: 83.5 FL — SIGNIFICANT CHANGE UP (ref 80–100)
NRBC # BLD: 0 /100 WBCS — SIGNIFICANT CHANGE UP (ref 0–0)
PHOSPHATE SERPL-MCNC: 3.6 MG/DL — SIGNIFICANT CHANGE UP (ref 2.5–4.5)
PLATELET # BLD AUTO: 417 K/UL — HIGH (ref 150–400)
POTASSIUM SERPL-MCNC: 4.3 MMOL/L — SIGNIFICANT CHANGE UP (ref 3.5–5.3)
POTASSIUM SERPL-SCNC: 4.3 MMOL/L — SIGNIFICANT CHANGE UP (ref 3.5–5.3)
RBC # BLD: 4.05 M/UL — LOW (ref 4.2–5.8)
RBC # FLD: 11.8 % — SIGNIFICANT CHANGE UP (ref 10.3–14.5)
SODIUM SERPL-SCNC: 139 MMOL/L — SIGNIFICANT CHANGE UP (ref 135–145)
SPECIMEN SOURCE: SIGNIFICANT CHANGE UP
SPECIMEN SOURCE: SIGNIFICANT CHANGE UP
WBC # BLD: 9.6 K/UL — SIGNIFICANT CHANGE UP (ref 3.8–10.5)
WBC # FLD AUTO: 9.6 K/UL — SIGNIFICANT CHANGE UP (ref 3.8–10.5)

## 2024-04-07 RX ORDER — SODIUM CHLORIDE 9 MG/ML
1000 INJECTION, SOLUTION INTRAVENOUS
Refills: 0 | Status: DISCONTINUED | OUTPATIENT
Start: 2024-04-08 | End: 2024-04-08

## 2024-04-07 RX ORDER — INSULIN LISPRO 100/ML
VIAL (ML) SUBCUTANEOUS EVERY 6 HOURS
Refills: 0 | Status: DISCONTINUED | OUTPATIENT
Start: 2024-04-08 | End: 2024-04-10

## 2024-04-07 RX ADMIN — Medication 1: at 08:46

## 2024-04-07 RX ADMIN — PIPERACILLIN AND TAZOBACTAM 25 GRAM(S): 4; .5 INJECTION, POWDER, LYOPHILIZED, FOR SOLUTION INTRAVENOUS at 13:02

## 2024-04-07 RX ADMIN — Medication 2: at 12:36

## 2024-04-07 RX ADMIN — Medication 1: at 17:36

## 2024-04-07 RX ADMIN — ENOXAPARIN SODIUM 40 MILLIGRAM(S): 100 INJECTION SUBCUTANEOUS at 22:02

## 2024-04-07 RX ADMIN — CHLORHEXIDINE GLUCONATE 1 APPLICATION(S): 213 SOLUTION TOPICAL at 07:18

## 2024-04-07 RX ADMIN — PIPERACILLIN AND TAZOBACTAM 25 GRAM(S): 4; .5 INJECTION, POWDER, LYOPHILIZED, FOR SOLUTION INTRAVENOUS at 06:25

## 2024-04-07 RX ADMIN — PIPERACILLIN AND TAZOBACTAM 25 GRAM(S): 4; .5 INJECTION, POWDER, LYOPHILIZED, FOR SOLUTION INTRAVENOUS at 22:01

## 2024-04-07 NOTE — PROGRESS NOTE ADULT - SUBJECTIVE AND OBJECTIVE BOX
EDA CHAWLA is a 59y Male active smoker with a hx of Stage IV rectal adenocarcinoma s/p colectomy with colostomy (1/2024) and T2DM c/b peripheral neuropathy who presented from PCP concerning for L foot infection, found to have L fifth metatarsal osteomyelitis.     INTERVAL HX:  - no acute events overnight    Review of Systems: Focused ROS negative other that those stated above.    Allergies:  No Known Allergies    Medications:  acetaminophen     Tablet .. 650 milliGRAM(s) Oral every 6 hours PRN  albuterol    90 MICROgram(s) HFA Inhaler 2 Puff(s) Inhalation every 6 hours PRN  chlorhexidine 2% Cloths 1 Application(s) Topical <User Schedule>  dextrose 5%. 1000 milliLiter(s) IV Continuous <Continuous>  dextrose 5%. 1000 milliLiter(s) IV Continuous <Continuous>  dextrose 50% Injectable 25 Gram(s) IV Push once  dextrose 50% Injectable 25 Gram(s) IV Push once  dextrose 50% Injectable 12.5 Gram(s) IV Push once  dextrose Oral Gel 15 Gram(s) Oral once PRN  enoxaparin Injectable 40 milliGRAM(s) SubCutaneous every 24 hours  glucagon  Injectable 1 milliGRAM(s) IntraMuscular once  influenza   Vaccine 0.5 milliLiter(s) IntraMuscular once  insulin lispro (ADMELOG) corrective regimen sliding scale   SubCutaneous three times a day before meals  insulin lispro (ADMELOG) corrective regimen sliding scale   SubCutaneous at bedtime  piperacillin/tazobactam IVPB.. 3.375 Gram(s) IV Intermittent every 8 hours    Vitals:  T(C): 37.1 (04-07-24 @ 06:10), Max: 37.1 (04-06-24 @ 21:22)  HR: 76 (04-07-24 @ 06:10) (73 - 96)  BP: 133/80 (04-07-24 @ 06:10) (130/77 - 133/80)  RR: 20 (04-07-24 @ 06:10) (18 - 20)  SpO2: 96% (04-07-24 @ 06:10) (96% - 98%)    I/Os:    04-06-24 @ 07:01  -  04-07-24 @ 07:00  --------------------------------------------------------  IN: 350 mL / OUT: 400 mL / NET: -50 mL    Physical Exam:  CONSTITUTIONAL: no apparent distress.  SKIN: No rashes or ecchymosis.  EYES: PERRLA. EOMI. No scleral icterus.  ENT: Supple. No discharge or glossitis. Oral mucosa with moist membranes. Hearing intact bilaterally.  CV: RRR. Normal S1 and S2. No murmurs, rubs, or gallops. No edema. Pulses equal bilaterally.  PULM: Clear to auscultation throughout. Respirations unlabored. No wheezing, rales, or rhonchi.  GI: Soft, non-tender, non-distended. No palpable masses.  MSK: (+) L foot wrapped. No cyanosis or clubbing.  NEURO: CN grossly normal  PSYCH: A+O, appropriately communicating and responding to questions    Labs:                        11.3   9.60  )-----------( 417      ( 07 Apr 2024 06:32 )             33.8     04-07    139  |  101  |  21  ----------------------------<  182<H>  4.3   |  24  |  1.09    Ca    9.3      07 Apr 2024 06:32  Phos  3.6     04-07  Mg     1.9     04-07    Radiology/Procedures: Reviewed.  VA Physiol Extremity Lower 3+ Level, BI 04.03.24  IMPRESSION: Moderate arterial flow limitation in the left lower extremity localizing to the iliofemoral distribution. Consider lower extremity arterial duplex study for further evaluation.    MR Foot w/wo IV Cont, Left 04.02.24  IMPRESSION:  Findings suggestive of early osteomyelitis at the fifth metatarsal head and base of the proximal phalanx, deep to the soft tissue wound. Focal osteitis is also a differential consideration for the osseous findings..    Xray Chest 1 View AP/PA 04.02.24  IMPRESSION:  No radiographic evidence of acute cardiopulmonary disease. Right-sided chest wall port with tip in proper position.    Xray Foot AP + Lateral + Oblique, Left 04.02.24  IMPRESSION:  No tracking soft tissue gas collections, radiopaque foreign bodies, or gross radiographic evidence for osteomyelitis.  No fractures or dislocations.  Tarsometatarsal alignment maintained without evidence for a Lisfranc injury.  Congenitally fused 5th DIP joint. Preserved remaining visualized joint spaces and no joint margin erosions.  No lytic or blastic lesions.    TTE W or WO Ultrasound Enhancing Agent 04.03.24  CONCLUSIONS:   1. Left ventricular cavity is normal in size. Left ventricular wall thickness is normal. Left ventricular systolic function is normal with an ejection fraction of 69 % by Barraza's method of disks. There are no regional wall motion abnormalities seen.   2. Normal left ventricular diastolic function, with normal filling pressure.   3. Normal right ventricular cavity size, with normal wall thickness, and normal systolic function. Tricuspid annular plane systolic excursion (TAPSE) is 1.8 cm (normal >=1.7 cm).   4. The right atrium is normal in size.   5. Normal left and right atrial size.   6. No significant valvular disease.   7. No pericardial effusion seen.   8. Left ventricular global longitudinal strain is -21.3 % which is normal (< -18%). Images were acquired on a [employer] ultrasound system and processed using [employer] strain analysis software with a heart rate of 80 bpm and a blood pressure of 123/72 mmHg.   9. No prior echocardiogram is available for comparison.  10. There is normal LV mass andconcentric remodeling.

## 2024-04-07 NOTE — PROGRESS NOTE ADULT - ASSESSMENT
Mr. Menon is a 59 year old male active smoker with PMHx of  peripheral neuropathy, Type II DM,  anemia,  status post colectomy with colostomy 01/2024  due to rectal cancer under the care of Dr. Renny Purvis now admitted with osteomyelitis of the left foot.  MRI foot shows Findings suggestive of early osteomyelitis at the fifth metatarsal head and base of the proximal phalanx, deep to the soft tissue wound. Focal osteitis is also a differential consideration for the osseous findings. Podiatry pending resection with graft. Infectious disease consulted.    Labs show hemoglobin 11.6, hematocrit  34.7, MCV 83.6 platelet count 339, WBC 12.14 with neutrophilic predominance. Creatinine 1.12 with normal liver function.  HbA1c 6.8.  TTE with EF 69%.     Patient last seen in Hematology Oncology Clinic 04/02/2024  and oxaliplatin was discontinued due to peripheral neuropathy. Podiatry scheduled pt for left foot fifth metatarsal head resection with wound debridement and graft application 04/04/2024       # Stage IV Rectal Adenocarcinoma   - Diagnosed 08/2023  - Invasive, moderately differentiated colonic adenocarcinoma  - Low probability of MSI-high  - HER-2-Mary Negative  - KRAS is Wild Type  - Metastatic to liver  - FOLFOX and weekly Cetuximab started 9/18/23  - PET-CT from 03/20/2024 shows that the patient is status post surgery with no abnormal FDG uptake in the pelvis postsurgical area.  More prominent uptake in the gallbladder fossa (early recurrence versus normal fluctuation of uptake ) and no other FDG avid lesion in the field-of-view.  Previously noted liver Mets are not presently seen.  - Was scheduled to receive 5FU, Leucovorin and Cetuximab 04/02/2024, therapy held due to concern for L foot infection and pt sent to Podiatry   - Plan to continue therapy once able to from infection standpoint, pt is aware     # Left Foot Osteomyelitis  # PVD  - Podiatry scheduled pt for left foot fifth metatarsal head resection with wound debridement and graft application, delayed  - Vascular surgery DAMIEN suggestive of moderate arterial flow limitation in the left lower extremity localizing to the iliofemoral distribution, pending angiogram possible angioplasty and stent as required.\  --DAMIEN LLE with 0.65 indicating moderate lower extremity arterial vascular disease.  - Antibiotics per ID and primary team     # Neutrophilia   - Due to infection  - Outpatient labs 04/02/2024 WBC 16.19 ANC 14,170  - Continue to monitor, improving     # Normocytic anemia  - Likely multifactorial, active infection, cancer, chemotherapy, dilutional   - Outpatient labs 04/02/2024 Hb 13.7, MCV 83.5   - Continue to monitor  - Transfuse to maintain Hb > 7     # Nicotine dependence, active cigarette smoker  - Counseling provided, pt aware of possible compromised wound healing       Thank you for allowing me to participate in the care of Mr. Menon, please do not hesitate to call or text me if you have further questions or concerns.     Gato Horvath MD  [facility]   Division of Hematology/Oncology  [address] [place] [address], [address]  [place] [address]  P: [phone]  F: [phone]    Attestation:    ----Pt evaluated including face-to-face interaction in addition to chart review, reviewing treatment plan, and managing the patient’s chronic diagnoses as listed in the assessment----

## 2024-04-07 NOTE — PROGRESS NOTE ADULT - PROBLEM SELECTOR PLAN 1
In setting of diabetic foot wound. MRI demonstrating early osteomyelitis of L foot 5th metatarsal head and base of proximal phalanx.  - DAMIEN demonstrating LLE PAD  - BCx: NGTD  - Wound Cx: Klebsiella   - Podiatry following: OR for L foot 5th metatarsal head resection with graft application 4/10 pending vascular  - Vascular surgery following: LLE angiogram with possible stent 4/8  - ID following: switch to Zosyn  - NPO at midnight with 3am labs for LLE angiogram 4/8

## 2024-04-07 NOTE — PROGRESS NOTE ADULT - SUBJECTIVE AND OBJECTIVE BOX
[facility] HEMATOLOGY/ONCOLOGY INPATIENT PROGRESS NOTE     Interval Hx:   04-07-24: Mr. Menon was seen at bedside today.    Meds:   MEDICATIONS  (STANDING):  chlorhexidine 2% Cloths 1 Application(s) Topical <User Schedule>  dextrose 5%. 1000 milliLiter(s) (100 mL/Hr) IV Continuous <Continuous>  dextrose 5%. 1000 milliLiter(s) (50 mL/Hr) IV Continuous <Continuous>  dextrose 50% Injectable 25 Gram(s) IV Push once  dextrose 50% Injectable 12.5 Gram(s) IV Push once  dextrose 50% Injectable 25 Gram(s) IV Push once  enoxaparin Injectable 40 milliGRAM(s) SubCutaneous every 24 hours  glucagon  Injectable 1 milliGRAM(s) IntraMuscular once  influenza   Vaccine 0.5 milliLiter(s) IntraMuscular once  insulin lispro (ADMELOG) corrective regimen sliding scale   SubCutaneous three times a day before meals  insulin lispro (ADMELOG) corrective regimen sliding scale   SubCutaneous at bedtime  piperacillin/tazobactam IVPB.. 3.375 Gram(s) IV Intermittent every 8 hours    MEDICATIONS  (PRN):  acetaminophen     Tablet .. 650 milliGRAM(s) Oral every 6 hours PRN Temp greater or equal to 38C (100.4F), Mild Pain (1 - 3)  albuterol    90 MICROgram(s) HFA Inhaler 2 Puff(s) Inhalation every 6 hours PRN Bronchospasm  dextrose Oral Gel 15 Gram(s) Oral once PRN Blood Glucose LESS THAN 70 milliGRAM(s)/deciliter    Vital Signs Last 24 Hrs  T(C): 37.1 (07 Apr 2024 06:10), Max: 37.1 (06 Apr 2024 21:22)  T(F): 98.7 (07 Apr 2024 06:10), Max: 98.8 (06 Apr 2024 21:22)  HR: 76 (07 Apr 2024 06:10) (73 - 96)  BP: 133/80 (07 Apr 2024 06:10) (130/77 - 133/80)  BP(mean): --  RR: 20 (07 Apr 2024 06:10) (18 - 20)  SpO2: 96% (07 Apr 2024 06:10) (96% - 98%)    Parameters below as of 07 Apr 2024 06:10  Patient On (Oxygen Delivery Method): room air    Physical Exam:  Gen: NAD  HEENT: EOMI, MMM  Chest: equal chest rise, speaking full sentences   Cardiac: RR  Abd: Nondistended  Ext: LLE/foot cellulitis, bandaged with betadine type solution   Neuro: AAOx3, normal mood and affect     Labs:                        11.6   10.57 )-----------( 382      ( 06 Apr 2024 07:28 )             33.8     CBC Full  -  ( 06 Apr 2024 07:28 )  WBC Count : 10.57 K/uL  RBC Count : 4.09 M/uL  Hemoglobin : 11.6 g/dL  Hematocrit : 33.8 %  Platelet Count - Automated : 382 K/uL  Mean Cell Volume : 82.6 fl  Mean Cell Hemoglobin : 28.4 pg  Mean Cell Hemoglobin Concentration : 34.3 gm/dL    04-06    137  |  102  |  23  ----------------------------<  153<H>  4.2   |  22  |  1.11    Ca    9.4      06 Apr 2024 07:28  Phos  3.7     04-06  Mg     2.0     04-06         [facility] HEMATOLOGY/ONCOLOGY INPATIENT PROGRESS NOTE     Interval Hx:   04-07-24: Mr. Menon was seen at bedside today, no acute complaints, is requesting LLE foot dressing change     Meds:   MEDICATIONS  (STANDING):  chlorhexidine 2% Cloths 1 Application(s) Topical <User Schedule>  dextrose 5%. 1000 milliLiter(s) (100 mL/Hr) IV Continuous <Continuous>  dextrose 5%. 1000 milliLiter(s) (50 mL/Hr) IV Continuous <Continuous>  dextrose 50% Injectable 25 Gram(s) IV Push once  dextrose 50% Injectable 12.5 Gram(s) IV Push once  dextrose 50% Injectable 25 Gram(s) IV Push once  enoxaparin Injectable 40 milliGRAM(s) SubCutaneous every 24 hours  glucagon  Injectable 1 milliGRAM(s) IntraMuscular once  influenza   Vaccine 0.5 milliLiter(s) IntraMuscular once  insulin lispro (ADMELOG) corrective regimen sliding scale   SubCutaneous three times a day before meals  insulin lispro (ADMELOG) corrective regimen sliding scale   SubCutaneous at bedtime  piperacillin/tazobactam IVPB.. 3.375 Gram(s) IV Intermittent every 8 hours    MEDICATIONS  (PRN):  acetaminophen     Tablet .. 650 milliGRAM(s) Oral every 6 hours PRN Temp greater or equal to 38C (100.4F), Mild Pain (1 - 3)  albuterol    90 MICROgram(s) HFA Inhaler 2 Puff(s) Inhalation every 6 hours PRN Bronchospasm  dextrose Oral Gel 15 Gram(s) Oral once PRN Blood Glucose LESS THAN 70 milliGRAM(s)/deciliter    Vital Signs Last 24 Hrs  T(C): 37.1 (07 Apr 2024 06:10), Max: 37.1 (06 Apr 2024 21:22)  T(F): 98.7 (07 Apr 2024 06:10), Max: 98.8 (06 Apr 2024 21:22)  HR: 76 (07 Apr 2024 06:10) (73 - 96)  BP: 133/80 (07 Apr 2024 06:10) (130/77 - 133/80)  BP(mean): --  RR: 20 (07 Apr 2024 06:10) (18 - 20)  SpO2: 96% (07 Apr 2024 06:10) (96% - 98%)    Parameters below as of 07 Apr 2024 06:10  Patient On (Oxygen Delivery Method): room air    Physical Exam:  Gen: NAD  HEENT: EOMI, MMM  Chest: equal chest rise, speaking full sentences   Cardiac: RR  Abd: Nondistended  Ext: LLE/foot cellulitis, bandaged with betadine type solution   Neuro: AAOx3, normal mood and affect     Labs:                        11.6   10.57 )-----------( 382      ( 06 Apr 2024 07:28 )             33.8     CBC Full  -  ( 06 Apr 2024 07:28 )  WBC Count : 10.57 K/uL  RBC Count : 4.09 M/uL  Hemoglobin : 11.6 g/dL  Hematocrit : 33.8 %  Platelet Count - Automated : 382 K/uL  Mean Cell Volume : 82.6 fl  Mean Cell Hemoglobin : 28.4 pg  Mean Cell Hemoglobin Concentration : 34.3 gm/dL    04-06    137  |  102  |  23  ----------------------------<  153<H>  4.2   |  22  |  1.11    Ca    9.4      06 Apr 2024 07:28  Phos  3.7     04-06  Mg     2.0     04-06

## 2024-04-07 NOTE — DISCHARGE NOTE PROVIDER - NSDCQMAMI_CARD_ALL_CORE
"Chief complaint: Postpartum visit    SUBJECTIVE:   Pt is a 33 y.o.  now s/p primary low transverse  section on . Pt denies fever, abdominal pain, n/v/d/c, VB, Pt currently bottle feeding and taking PNV, ambulating without difficulty, urinating and stooling without complaints and tolerating normal diet.  Denies any depression at this time no SI or HI currently. Desires Nexplanon for contraception.  OBJECTIVE:  /86   Ht 157.5 cm (62\")   Wt 117 kg (258 lb 6.4 oz)   LMP 2020 (Approximate)   BMI 47.26 kg/m²     Review of Systems   Constitutional: Negative for chills, fatigue and fever.   HENT: Negative for sore throat.    Eyes: Negative for visual disturbance.   Respiratory: Negative for cough, shortness of breath and wheezing.    Cardiovascular: Negative for chest pain, palpitations and leg swelling.   Gastrointestinal: Negative for abdominal pain, diarrhea, nausea and vomiting.   Genitourinary: Negative for dysuria, flank pain, frequency, vaginal bleeding, vaginal discharge and vaginal pain.   Neurological: Negative for syncope, light-headedness and headaches.   Psychiatric/Behavioral: Negative for dysphoric mood and suicidal ideas. The patient is not nervous/anxious.        Physical Exam  Vitals signs reviewed.   Constitutional:       General: She is not in acute distress.     Appearance: Normal appearance. She is not ill-appearing, toxic-appearing or diaphoretic.   HENT:      Head: Normocephalic.   Abdominal:      General: There is no distension.      Palpations: Abdomen is soft.      Tenderness: There is no abdominal tenderness. There is no guarding.      Comments: Appropriately healing Pfannenstiel incision no redness or erythema no evidence of infection dressing was already removed.   Musculoskeletal: Normal range of motion.   Skin:     General: Skin is warm and dry.   Neurological:      General: No focal deficit present.      Mental Status: She is alert and oriented to " person, place, and time.   Psychiatric:         Mood and Affect: Mood normal.         Behavior: Behavior normal.         Thought Content: Thought content normal.         Judgment: Judgment normal.         ASSESSMENT:    Pt is a 33 y.o.  s/p primary low transverse  section doing well and recovering appropriately at this time.  PLAN:   1.  Nexplanon for contraception  2. Pt to continue to increase exercise/daily activities as tolerated   3. Continue prenatal vitamins   4. f/u 4 weeks, will have patient get Nexplanon at that time if that is what she decides.    Med reconciliation completed.        This document has been electronically signed by Nikolai Mtz DO on December 3, 2020 11:58 CST     No

## 2024-04-07 NOTE — DISCHARGE NOTE PROVIDER - NSFOLLOWUPCLINICS_GEN_ALL_ED_FT
[facility]  Podiatry  [address] [place] [address] - [address] [place] [address]  Phone: [phone]  Fax:   Follow Up Time: 1 week

## 2024-04-07 NOTE — DISCHARGE NOTE PROVIDER - HOSPITAL COURSE
HPI:  NIGHT HOSPITALIST:     Patient UNKNOWN to me previously, assigned to me at this point via the ER and by Dr. 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   Patient admits that he does not normally inspect his feet (patient with a healed RIGHT plantar 5th MTP ulcer) until today.  Seen by Podiatry in the ER with LEFT foot submet 5 wound with dressing changed by Podiatry.  NO fever, no chills, no rigors.  NO N/V.    NO chest pain/pressure.  NO palpitations.  NO cough, no dyspnoea.  NO abdominal pain, no ostomy pain.  NO red blood per rectum or ostomy.  No back pain, no tearing back pain.  NO dysuria. (02 Apr 2024 22:00)    Hospital Course:  EDA CHAWLA is a 59y Male active smoker with a hx of Stage IV rectal adenocarcinoma s/p colectomy with colostomy (1/2024) and T2DM c/b peripheral neuropathy who presented from PCP concerning for L foot infection, found to have L fifth metatarsal osteomyelitis managed with cefepime. LLE angiogram performed 4/8 per vascular surgery. L foot 5th metatarsal head resection with graft application 4/10 per podiatry.    Important Medication Changes and Reason:    Active or Pending Issues Requiring Follow-up:    Advanced Directives:   [ ] Full code  [ ] DNR  [ ] Hospice    Discharge Diagnoses:  - L fifth metatarsal osteomyelitis       Hospital Course:  EDA CHAWLA is a 59y Male active smoker with a hx of Stage IV rectal adenocarcinoma s/p colectomy with colostomy (1/2024) and T2DM c/b peripheral neuropathy who presented from PCP concerning for L foot infection, found to have L fifth metatarsal osteomyelitis managed with cefepime. LLE angiogram performed 4/8 per vascular surgery. L foot 5th metatarsal head resection with graft application 4/10 per podiatry.    Important Medication Changes and Reason:    Active or Pending Issues Requiring Follow-up:    Advanced Directives:   [ ] Full code  [ ] DNR  [ ] Hospice    Discharge Diagnoses:  - L fifth metatarsal osteomyelitis       Hospital Course:  EDA CHAWLA is a 59y Male active smoker with a hx of Stage IV rectal adenocarcinoma s/p colectomy with colostomy (1/2024) and T2DM c/b peripheral neuropathy who presented from PCP concerning for L foot infection, found to have L fifth metatarsal osteomyelitis managed with cefepime. LLE angiogram performed 4/8 per vascular surgery. L foot 5th metatarsal head resection with graft application 4/10 per podiatry.    Important Medication Changes and Reason:  Ertapenem x 6 week course    Active or Pending Issues Requiring Follow-up:  Outpatient follow up.  Follow up with podiatry  Follow up with vascular surgery  Follow up with infectious disease      Advanced Directives:   [ ] Full code  [ ] DNR  [ ] Hospice    Discharge Diagnoses:  - L fifth metatarsal osteomyelitis       EDA CHAWLA is a 59y Male active smoker with a hx of Stage IV rectal adenocarcinoma s/p colectomy with colostomy (1/2024) and T2DM c/b peripheral neuropathy who presented from PCP concerning for L foot infection.  Patient was unaware of any infection but noticed some drainage between his second and third toes this morning prompting him to go to the podiatrist.  There he was found to have what is described as a "bad" foot infection with concern for osteo and was sent in for admission and IV antibiotics. The patient was admitted for osteomyelitis of the left foot s/p cefepime, then zosyn, now on ertapenem based on culture results/ID recs. Duplex showing arterial disease in the LLE. Vascular consulted S/p 4/8 LLE angio with POBA to distal left superficial femoral, started on DAPT. The patient underwent a left foot partial excision of 5th metatarsal on 4/10 with graft placement on 4/12. Per ID, will need Ertapenem 1g IV Q24h (for ease of dosing) to complete 6 week course on 5/23/24 (confirmed on 4/13). PICC placed 4/13 PM.     Important Medication Changes and Reason:  Ertapenem x 6 week course until 5/23/24  DAPT for PAD    Active or Pending Issues Requiring Follow-up:  Outpatient follow up.  Follow up with podiatry  Follow up with vascular surgery  Follow up with infectious disease      Advanced Directives:   [ ] Full code  [ ] DNR  [ ] Hospice    Discharge Diagnoses:  - L fifth metatarsal osteomyelitis

## 2024-04-07 NOTE — DISCHARGE NOTE PROVIDER - NSDCFUADDAPPT_GEN_ALL_CORE_FT
Podiatry Discharge Instructions:  Follow up: Please follow up with Dr. Call within 1 week of discharge from the hospital, please call [phone] for appointment and discuss that you recently were seen in the hospital.  Wound Care: Please apply iodoform packing to left foot wound followed by 4x4 gauze, ABD pad, and mateo daily.   Weight bearing: Please weight bear as tolerated in a surgical shoe to left heel.  Antibiotics: Please continue as instructed. Podiatry Discharge Instructions:  Follow up: Please follow up with Dr. Call within 1 week of discharge from the hospital, please call [phone] for appointment and discuss that you recently were seen in the hospital.  Wound Care: Please apply black granuofoam wound vac at 75mmHg continuously 3 times a week.  Weight bearing: Please weight bear as tolerated in a surgical shoe to left heel.  Antibiotics: Please continue as instructed.

## 2024-04-07 NOTE — DISCHARGE NOTE PROVIDER - NSDCMRMEDTOKEN_GEN_ALL_CORE_FT
Ertapenem 1g IV every 24hours through PICC until 5/23/24: For diagnosis of M86.10 osteomyelitis  Weekly CBC, CMP, ESR, CRP while on antibiotics (through 5/23/24). Please fax results to [phone]: Weekly CBC, CMP, ESR, CRP while on antibiotics (through 5/23/24). Please fax results to [phone]   aspirin 81 mg oral tablet, chewable: 1 tab(s) orally once a day  clopidogrel 75 mg oral tablet: 1 tab(s) orally once a day

## 2024-04-07 NOTE — DISCHARGE NOTE PROVIDER - CARE PROVIDER_API CALL
Griselda Olivas  Internal Medicine  [address] [place] [id number]  Phone: [phone]  Fax: [phone]  Established Patient  Follow Up Time: 2 weeks   yes...

## 2024-04-07 NOTE — PRE PROCEDURE NOTE - PRE PROCEDURE EVALUATION
Surgery Preop Note    Diagnosis: left foot wound and osteomyelitis  Procedure: LLE Angiogram  Surgeon: Siena                          11.3   9.60  )-----------( 417      ( 07 Apr 2024 06:32 )             33.8     04-07    139  |  101  |  21  ----------------------------<  182<H>  4.3   |  24  |  1.09    Ca    9.3      07 Apr 2024 06:32  Phos  3.6     04-07  Mg     1.9     04-07          Urinalysis Basic - ( 07 Apr 2024 06:32 )    Color: x / Appearance: x / SG: x / pH: x  Gluc: 182 mg/dL / Ketone: x  / Bili: x / Urobili: x   Blood: x / Protein: x / Nitrite: x   Leuk Esterase: x / RBC: x / WBC x   Sq Epi: x / Non Sq Epi: x / Bacteria: x        Assessment: 59M active smoker (48-pack-year history) hx stage IV colon cancer, s/p colectomy with ostomy 1/2024, peripheral neuropathy 2/2 neoadjuvant chemo, sent in from his podiatrist's office with concern for osteomyelitis of the left foot. Patient with xray negative for left toe OM, however MRI sf left foot 5th met head OM, 5th proximal phalanx base OM. S/p incision and drainage of wound in ED by podiatry, now planning for left foot 5th metatarsal head resection with graft application vs LWC, pending vascular surgery recommendations. DAMIEN PVR 0.65 on left, left toe 0.30, cf moderate arterial flow limitation in the left lower extremity localizing to the iliofemoral distribution.    Preop Checklist:    [x]NPO after midnight  [x]IVF  [x]AM labs (CBC, BMP, coags)  [x]Type and Screen x2  [x]Diabetes orders  [x]Consent

## 2024-04-07 NOTE — PROGRESS NOTE ADULT - ASSESSMENT
EDA CHAWLA is a 59y Male active smoker with a hx of Stage IV rectal adenocarcinoma s/p colectomy with colostomy (1/2024) and T2DM c/b peripheral neuropathy who presented from PCP concerning for L foot infection, found to have L fifth metatarsal osteomyelitis. OR with podiatry pending vascular surgery. LLE angiogram with possible stent scheduled for 4/8. L foot 5th metatarsal head resection with graft application scheduled for 4/10.

## 2024-04-07 NOTE — PROGRESS NOTE ADULT - SUBJECTIVE AND OBJECTIVE BOX
DATE OF SERVICE: 04-07-24 @ 14:41    Patient is a 59y old  Male who presents with a chief complaint of Sent to the ER by PCP following concern for LEFT foot infection (07 Apr 2024 09:41)      INTERVAL HISTORY: feels well    REVIEW OF SYSTEMS:  CONSTITUTIONAL: No weakness  EYES/ENT: No visual changes;  No throat pain   NECK: No pain or stiffness  RESPIRATORY: No cough, wheezing; No shortness of breath  CARDIOVASCULAR: No chest pain or palpitations  GASTROINTESTINAL: No abdominal  pain. No nausea, vomiting, or hematemesis  GENITOURINARY: No dysuria, frequency or hematuria  NEUROLOGICAL: No stroke like symptoms  SKIN: No rashes      	  MEDICATIONS:        PHYSICAL EXAM:  T(C): 35.8 (04-07-24 @ 11:49), Max: 37.1 (04-06-24 @ 21:22)  HR: 75 (04-07-24 @ 11:49) (73 - 96)  BP: 121/63 (04-07-24 @ 11:49) (121/63 - 133/80)  RR: 20 (04-07-24 @ 11:49) (18 - 20)  SpO2: 96% (04-07-24 @ 11:49) (96% - 98%)  Wt(kg): --  I&O's Summary    06 Apr 2024 07:01  -  07 Apr 2024 07:00  --------------------------------------------------------  IN: 350 mL / OUT: 400 mL / NET: -50 mL          Appearance: In no distress	  HEENT:    PERRL, EOMI	  Cardiovascular:  S1 S2, No JVD  Respiratory: Lungs clear to auscultation	  Gastrointestinal:  Soft, Non-tender, + BS	  Vascularature:  No edema of LE  Psychiatric: Appropriate affect   Neuro: no acute focal deficits                               11.3   9.60  )-----------( 417      ( 07 Apr 2024 06:32 )             33.8     04-07    139  |  101  |  21  ----------------------------<  182<H>  4.3   |  24  |  1.09    Ca    9.3      07 Apr 2024 06:32  Phos  3.6     04-07  Mg     1.9     04-07          Labs personally reviewed      ASSESSMENT/PLAN: 	  This is a 59 year old male PMH: Colon ca, Tobacco dependance.  Admitted for left foot pain. + Ulcer      1. Preop Risk Stratification - TTE unremarkable with preserved EF  - EKG non ischemic   - Reports good functional capacity prior to diabetic foot ulcer  - Elevated risk for mod risk LE angio and pods surgery, no contraindication to proceed    2.   Diabetic foot ulcer  - MRI foot  suggestive of early osteomyelitis   - C/w local wound care per Pod  - Abx as per primary team  - POD following-> Plan for left foot 5th met head resection    3.  Dm2:  - HgbA1c 6.8    4 DVT ppx:  - Lovenox sq                  Matt Behjackie, AG-NP   Nuno Yeager DO [facility]  Cardiovascular Medicine  [address] [place] [address], [address]  Available through call or text on [employer]  Office: [phone]

## 2024-04-07 NOTE — DISCHARGE NOTE PROVIDER - NSDCCPCAREPLAN_GEN_ALL_CORE_FT
PRINCIPAL DISCHARGE DIAGNOSIS  Diagnosis: Foot infection  Assessment and Plan of Treatment: You had an infection of your bone in your 5th toe on the left side. This is called osteomyelitis. This was treated with a surgery to remove the bone as well as second surgery to apply a graft to heal the skin on top. You are to take ertapenem antibiotic until 5/23/24      SECONDARY DISCHARGE DIAGNOSES  Diagnosis: Diabetes mellitus  Assessment and Plan of Treatment: You have diabetes mellitus, or type 2 diabetes. Your a1c is 6.8. Please follow up with your primary care physician so that your diabetes and blood sugar can be controlled.     PRINCIPAL DISCHARGE DIAGNOSIS  Diagnosis: Foot infection  Assessment and Plan of Treatment: You had an infection of your bone in your 5th toe on the left side. This is called osteomyelitis. This was treated with a surgery to remove the bone as well as second surgery to apply a graft to heal the skin on top. You are to take ertapenem antibiotic until 5/23/24. Continue taking aspirin and plavix for at least 3 months has you had a stent to open up the arteries in your leg to allow for better blood flow. Please follow up with your PCP. Please return to the ED if your experience any new or worsening symptoms.      SECONDARY DISCHARGE DIAGNOSES  Diagnosis: Diabetes mellitus  Assessment and Plan of Treatment: You have diabetes mellitus, or type 2 diabetes. Your a1c is 6.8. Please follow up with your primary care physician so that your diabetes and blood sugar can be controlled.

## 2024-04-07 NOTE — PROGRESS NOTE ADULT - ATTENDING COMMENTS
agree with note as above.
#diabetic foot wound with early osteomyelitis of L foot 5th metatarsal head and base of proximal phalanx.  - DAMIEN demonstrating LLE PAD  - BCx: NGTD  - Wound Cx: Klebsiella   - Podiatry following: OR for L foot 5th metatarsal head resection with graft application after LLE angio  - Vascular surgery following: LLE angiogram with possible stent; > mets 4 functional capacity; pt with intermediate risk for moderate risk procedure.
#diabetic foot wound with early osteomyelitis of L foot 5th metatarsal head and base of proximal phalanx.  - DAMIEN demonstrating LLE PAD  - BCx: NGTD  - Wound Cx: Klebsiella   - Podiatry following: OR for L foot 5th metatarsal head resection with graft application after LLE angio  - Vascular surgery following: LLE angiogram with possible stent; > mets 4 functional capacity; pt with intermediate risk for moderate risk procedure.

## 2024-04-08 LAB
ALBUMIN SERPL ELPH-MCNC: 3 G/DL — LOW (ref 3.3–5)
ALP SERPL-CCNC: 75 U/L — SIGNIFICANT CHANGE UP (ref 40–120)
ALT FLD-CCNC: 13 U/L — SIGNIFICANT CHANGE UP (ref 10–45)
ANION GAP SERPL CALC-SCNC: 11 MMOL/L — SIGNIFICANT CHANGE UP (ref 5–17)
APTT BLD: 33.8 SEC — SIGNIFICANT CHANGE UP (ref 24.5–35.6)
AST SERPL-CCNC: 10 U/L — SIGNIFICANT CHANGE UP (ref 10–40)
BILIRUB SERPL-MCNC: 0.2 MG/DL — SIGNIFICANT CHANGE UP (ref 0.2–1.2)
BLD GP AB SCN SERPL QL: NEGATIVE — SIGNIFICANT CHANGE UP
BUN SERPL-MCNC: 22 MG/DL — SIGNIFICANT CHANGE UP (ref 7–23)
CALCIUM SERPL-MCNC: 9.2 MG/DL — SIGNIFICANT CHANGE UP (ref 8.4–10.5)
CHLORIDE SERPL-SCNC: 99 MMOL/L — SIGNIFICANT CHANGE UP (ref 96–108)
CO2 SERPL-SCNC: 26 MMOL/L — SIGNIFICANT CHANGE UP (ref 22–31)
CREAT SERPL-MCNC: 1.08 MG/DL — SIGNIFICANT CHANGE UP (ref 0.5–1.3)
EGFR: 80 ML/MIN/1.73M2 — SIGNIFICANT CHANGE UP
GLUCOSE BLDC GLUCOMTR-MCNC: 205 MG/DL — HIGH (ref 70–99)
GLUCOSE BLDC GLUCOMTR-MCNC: 258 MG/DL — HIGH (ref 70–99)
GLUCOSE SERPL-MCNC: 282 MG/DL — HIGH (ref 70–99)
HCT VFR BLD CALC: 31.8 % — LOW (ref 39–50)
HGB BLD-MCNC: 10.4 G/DL — LOW (ref 13–17)
INR BLD: 1.18 RATIO — SIGNIFICANT CHANGE UP (ref 0.85–1.18)
MAGNESIUM SERPL-MCNC: 1.8 MG/DL — SIGNIFICANT CHANGE UP (ref 1.6–2.6)
MCHC RBC-ENTMCNC: 27.4 PG — SIGNIFICANT CHANGE UP (ref 27–34)
MCHC RBC-ENTMCNC: 32.7 GM/DL — SIGNIFICANT CHANGE UP (ref 32–36)
MCV RBC AUTO: 83.9 FL — SIGNIFICANT CHANGE UP (ref 80–100)
NRBC # BLD: 0 /100 WBCS — SIGNIFICANT CHANGE UP (ref 0–0)
PHOSPHATE SERPL-MCNC: 3.5 MG/DL — SIGNIFICANT CHANGE UP (ref 2.5–4.5)
PLATELET # BLD AUTO: 433 K/UL — HIGH (ref 150–400)
POTASSIUM SERPL-MCNC: 4.4 MMOL/L — SIGNIFICANT CHANGE UP (ref 3.5–5.3)
POTASSIUM SERPL-SCNC: 4.4 MMOL/L — SIGNIFICANT CHANGE UP (ref 3.5–5.3)
PROT SERPL-MCNC: 6.5 G/DL — SIGNIFICANT CHANGE UP (ref 6–8.3)
PROTHROM AB SERPL-ACNC: 12.9 SEC — SIGNIFICANT CHANGE UP (ref 9.5–13)
RBC # BLD: 3.79 M/UL — LOW (ref 4.2–5.8)
RBC # FLD: 11.9 % — SIGNIFICANT CHANGE UP (ref 10.3–14.5)
RH IG SCN BLD-IMP: POSITIVE — SIGNIFICANT CHANGE UP
SODIUM SERPL-SCNC: 136 MMOL/L — SIGNIFICANT CHANGE UP (ref 135–145)
WBC # BLD: 10.43 K/UL — SIGNIFICANT CHANGE UP (ref 3.8–10.5)
WBC # FLD AUTO: 10.43 K/UL — SIGNIFICANT CHANGE UP (ref 3.8–10.5)

## 2024-04-08 PROCEDURE — 99152 MOD SED SAME PHYS/QHP 5/>YRS: CPT

## 2024-04-08 PROCEDURE — 75710 ARTERY X-RAYS ARM/LEG: CPT | Mod: 26,59

## 2024-04-08 PROCEDURE — 37220: CPT

## 2024-04-08 PROCEDURE — 76937 US GUIDE VASCULAR ACCESS: CPT | Mod: 26

## 2024-04-08 PROCEDURE — 37226: CPT

## 2024-04-08 RX ORDER — ASPIRIN/CALCIUM CARB/MAGNESIUM 324 MG
81 TABLET ORAL DAILY
Refills: 0 | Status: DISCONTINUED | OUTPATIENT
Start: 2024-04-08 | End: 2024-04-10

## 2024-04-08 RX ORDER — CLOPIDOGREL BISULFATE 75 MG/1
75 TABLET, FILM COATED ORAL DAILY
Refills: 0 | Status: DISCONTINUED | OUTPATIENT
Start: 2024-04-09 | End: 2024-04-10

## 2024-04-08 RX ORDER — MAGNESIUM SULFATE 500 MG/ML
2 VIAL (ML) INJECTION ONCE
Refills: 0 | Status: COMPLETED | OUTPATIENT
Start: 2024-04-08 | End: 2024-04-08

## 2024-04-08 RX ORDER — CLOPIDOGREL BISULFATE 75 MG/1
300 TABLET, FILM COATED ORAL ONCE
Refills: 0 | Status: COMPLETED | OUTPATIENT
Start: 2024-04-08 | End: 2024-04-08

## 2024-04-08 RX ADMIN — ENOXAPARIN SODIUM 40 MILLIGRAM(S): 100 INJECTION SUBCUTANEOUS at 21:55

## 2024-04-08 RX ADMIN — CHLORHEXIDINE GLUCONATE 1 APPLICATION(S): 213 SOLUTION TOPICAL at 05:40

## 2024-04-08 RX ADMIN — SODIUM CHLORIDE 75 MILLILITER(S): 9 INJECTION, SOLUTION INTRAVENOUS at 13:03

## 2024-04-08 RX ADMIN — Medication 25 GRAM(S): at 05:39

## 2024-04-08 RX ADMIN — SODIUM CHLORIDE 75 MILLILITER(S): 9 INJECTION, SOLUTION INTRAVENOUS at 03:10

## 2024-04-08 RX ADMIN — CLOPIDOGREL BISULFATE 300 MILLIGRAM(S): 75 TABLET, FILM COATED ORAL at 13:02

## 2024-04-08 RX ADMIN — Medication 81 MILLIGRAM(S): at 13:02

## 2024-04-08 RX ADMIN — PIPERACILLIN AND TAZOBACTAM 25 GRAM(S): 4; .5 INJECTION, POWDER, LYOPHILIZED, FOR SOLUTION INTRAVENOUS at 13:54

## 2024-04-08 RX ADMIN — PIPERACILLIN AND TAZOBACTAM 25 GRAM(S): 4; .5 INJECTION, POWDER, LYOPHILIZED, FOR SOLUTION INTRAVENOUS at 05:39

## 2024-04-08 RX ADMIN — PIPERACILLIN AND TAZOBACTAM 25 GRAM(S): 4; .5 INJECTION, POWDER, LYOPHILIZED, FOR SOLUTION INTRAVENOUS at 21:55

## 2024-04-08 NOTE — PROGRESS NOTE ADULT - SUBJECTIVE AND OBJECTIVE BOX
[facility] HEMATOLOGY/ONCOLOGY INPATIENT PROGRESS NOTE     Interval Hx:   04-08-24: Mr. Menon was seen at bedside today.    Meds:   MEDICATIONS  (STANDING):  chlorhexidine 2% Cloths 1 Application(s) Topical <User Schedule>  dextrose 5%. 1000 milliLiter(s) (50 mL/Hr) IV Continuous <Continuous>  dextrose 5%. 1000 milliLiter(s) (100 mL/Hr) IV Continuous <Continuous>  dextrose 50% Injectable 12.5 Gram(s) IV Push once  dextrose 50% Injectable 25 Gram(s) IV Push once  dextrose 50% Injectable 25 Gram(s) IV Push once  enoxaparin Injectable 40 milliGRAM(s) SubCutaneous every 24 hours  glucagon  Injectable 1 milliGRAM(s) IntraMuscular once  influenza   Vaccine 0.5 milliLiter(s) IntraMuscular once  insulin lispro (ADMELOG) corrective regimen sliding scale   SubCutaneous three times a day before meals  insulin lispro (ADMELOG) corrective regimen sliding scale   SubCutaneous at bedtime  insulin lispro (ADMELOG) corrective regimen sliding scale   SubCutaneous every 6 hours  lactated ringers. 1000 milliLiter(s) (75 mL/Hr) IV Continuous <Continuous>  piperacillin/tazobactam IVPB.. 3.375 Gram(s) IV Intermittent every 8 hours    MEDICATIONS  (PRN):  acetaminophen     Tablet .. 650 milliGRAM(s) Oral every 6 hours PRN Temp greater or equal to 38C (100.4F), Mild Pain (1 - 3)  albuterol    90 MICROgram(s) HFA Inhaler 2 Puff(s) Inhalation every 6 hours PRN Bronchospasm  dextrose Oral Gel 15 Gram(s) Oral once PRN Blood Glucose LESS THAN 70 milliGRAM(s)/deciliter    Vital Signs Last 24 Hrs  T(C): 37.2 (08 Apr 2024 04:32), Max: 37.3 (07 Apr 2024 21:32)  T(F): 99 (08 Apr 2024 04:32), Max: 99.1 (07 Apr 2024 21:32)  HR: 75 (08 Apr 2024 04:32) (75 - 76)  BP: 126/73 (08 Apr 2024 04:32) (113/64 - 126/73)  BP(mean): --  RR: 18 (08 Apr 2024 04:32) (18 - 20)  SpO2: 98% (08 Apr 2024 04:32) (95% - 98%)    Parameters below as of 08 Apr 2024 04:32  Patient On (Oxygen Delivery Method): room air    Physical Exam:  Gen: NAD  HEENT: EOMI, MMM  Chest: equal chest rise, speaking full sentences   Cardiac: RR  Abd: Nondistended  Ext: LLE/foot cellulitis, bandaged with betadine type solution   Neuro: AAOx3, normal mood and affect     Labs:                        10.4   10.43 )-----------( 433      ( 08 Apr 2024 03:50 )             31.8     CBC Full  -  ( 08 Apr 2024 03:50 )  WBC Count : 10.43 K/uL  RBC Count : 3.79 M/uL  Hemoglobin : 10.4 g/dL  Hematocrit : 31.8 %  Platelet Count - Automated : 433 K/uL  Mean Cell Volume : 83.9 fl  Mean Cell Hemoglobin : 27.4 pg  Mean Cell Hemoglobin Concentration : 32.7 gm/dL    04-08    136  |  99  |  22  ----------------------------<  282<H>  4.4   |  26  |  1.08    Ca    9.2      08 Apr 2024 03:49  Phos  3.5     04-08  Mg     1.8     04-08    TPro  6.5  /  Alb  3.0<L>  /  TBili  0.2  /  DBili  x   /  AST  10  /  ALT  13  /  AlkPhos  75  04-08    PT/INR - ( 08 Apr 2024 03:49 )   PT: 12.9 sec;   INR: 1.18 ratio         PTT - ( 08 Apr 2024 03:49 )  PTT:33.8 sec  Bilirubin Total: 0.2 mg/dL (04-08-24 @ 03:49)   [facility] HEMATOLOGY/ONCOLOGY INPATIENT PROGRESS NOTE     Interval Hx:   04-08-24: Mr. Menon was seen at bedside today, awake and alert, no acute complaints pending LLE angiogram today with vascular surgery     Meds:   MEDICATIONS  (STANDING):  chlorhexidine 2% Cloths 1 Application(s) Topical <User Schedule>  dextrose 5%. 1000 milliLiter(s) (50 mL/Hr) IV Continuous <Continuous>  dextrose 5%. 1000 milliLiter(s) (100 mL/Hr) IV Continuous <Continuous>  dextrose 50% Injectable 12.5 Gram(s) IV Push once  dextrose 50% Injectable 25 Gram(s) IV Push once  dextrose 50% Injectable 25 Gram(s) IV Push once  enoxaparin Injectable 40 milliGRAM(s) SubCutaneous every 24 hours  glucagon  Injectable 1 milliGRAM(s) IntraMuscular once  influenza   Vaccine 0.5 milliLiter(s) IntraMuscular once  insulin lispro (ADMELOG) corrective regimen sliding scale   SubCutaneous three times a day before meals  insulin lispro (ADMELOG) corrective regimen sliding scale   SubCutaneous at bedtime  insulin lispro (ADMELOG) corrective regimen sliding scale   SubCutaneous every 6 hours  lactated ringers. 1000 milliLiter(s) (75 mL/Hr) IV Continuous <Continuous>  piperacillin/tazobactam IVPB.. 3.375 Gram(s) IV Intermittent every 8 hours    MEDICATIONS  (PRN):  acetaminophen     Tablet .. 650 milliGRAM(s) Oral every 6 hours PRN Temp greater or equal to 38C (100.4F), Mild Pain (1 - 3)  albuterol    90 MICROgram(s) HFA Inhaler 2 Puff(s) Inhalation every 6 hours PRN Bronchospasm  dextrose Oral Gel 15 Gram(s) Oral once PRN Blood Glucose LESS THAN 70 milliGRAM(s)/deciliter    Vital Signs Last 24 Hrs  T(C): 37.2 (08 Apr 2024 04:32), Max: 37.3 (07 Apr 2024 21:32)  T(F): 99 (08 Apr 2024 04:32), Max: 99.1 (07 Apr 2024 21:32)  HR: 75 (08 Apr 2024 04:32) (75 - 76)  BP: 126/73 (08 Apr 2024 04:32) (113/64 - 126/73)  BP(mean): --  RR: 18 (08 Apr 2024 04:32) (18 - 20)  SpO2: 98% (08 Apr 2024 04:32) (95% - 98%)    Parameters below as of 08 Apr 2024 04:32  Patient On (Oxygen Delivery Method): room air    Physical Exam:  Gen: NAD  HEENT: EOMI, MMM  Chest: equal chest rise, speaking full sentences   Cardiac: RR  Abd: Nondistended  Ext: LLE/foot cellulitis, bandaged with betadine type solution   Neuro: AAOx3, normal mood and affect     Labs:                        10.4   10.43 )-----------( 433      ( 08 Apr 2024 03:50 )             31.8     CBC Full  -  ( 08 Apr 2024 03:50 )  WBC Count : 10.43 K/uL  RBC Count : 3.79 M/uL  Hemoglobin : 10.4 g/dL  Hematocrit : 31.8 %  Platelet Count - Automated : 433 K/uL  Mean Cell Volume : 83.9 fl  Mean Cell Hemoglobin : 27.4 pg  Mean Cell Hemoglobin Concentration : 32.7 gm/dL    04-08    136  |  99  |  22  ----------------------------<  282<H>  4.4   |  26  |  1.08    Ca    9.2      08 Apr 2024 03:49  Phos  3.5     04-08  Mg     1.8     04-08    TPro  6.5  /  Alb  3.0<L>  /  TBili  0.2  /  DBili  x   /  AST  10  /  ALT  13  /  AlkPhos  75  04-08    PT/INR - ( 08 Apr 2024 03:49 )   PT: 12.9 sec;   INR: 1.18 ratio         PTT - ( 08 Apr 2024 03:49 )  PTT:33.8 sec  Bilirubin Total: 0.2 mg/dL (04-08-24 @ 03:49)

## 2024-04-08 NOTE — PROGRESS NOTE ADULT - SUBJECTIVE AND OBJECTIVE BOX
[facility] DIVISION OF INFECTIOUS DISEASES  REGIS Khan Y. Patel, S. Shah, G. Casimir  [phone]  ([phone] - weekdays after 5pm and weekends)    Name: EDA CHAWLA  Age/Gender: 59y Male  MRN: [id number]    Interval History:  Patient seen and examined.   No new complaints noted.  Notes reviewed, s/p angiogram earlier.   No concerning overnight events  Afebrile   Allergies: No Known Allergies      Objective:  Vitals:   T(F): 97.7 (04-08-24 @ 14:31), Max: 99.1 (04-07-24 @ 21:32)  HR: 75 (04-08-24 @ 14:31) (63 - 76)  BP: 122/78 (04-08-24 @ 14:31) (113/64 - 162/77)  RR: 18 (04-08-24 @ 14:31) (16 - 18)  SpO2: 97% (04-08-24 @ 14:31) (95% - 99%)  Physical Examination:  General: no acute distress, nontoxic appearing   HEENT: NC/AT, anicteric, neck supple  Respiratory: no acc muscle use, breathing comfortably  Cardiovascular: S1 and S2 present  Gastrointestinal: normal appearing, nondistended  Skin: no visible rash    Laboratory Studies:  CBC:                       10.4   10.43 )-----------( 433      ( 08 Apr 2024 03:50 )             31.8     WBC Trend:  10.43 04-08-24 @ 03:50  9.60 04-07-24 @ 06:32  10.57 04-06-24 @ 07:28  9.88 04-05-24 @ 12:02  11.47 04-04-24 @ 07:17  12.14 04-03-24 @ 07:17  14.12 04-02-24 @ 17:43    CMP: 04-08    136  |  99  |  22  ----------------------------<  282<H>  4.4   |  26  |  1.08    Ca    9.2      08 Apr 2024 03:49  Phos  3.5     04-08  Mg     1.8     04-08    TPro  6.5  /  Alb  3.0<L>  /  TBili  0.2  /  DBili  x   /  AST  10  /  ALT  13  /  AlkPhos  75  04-08    Creatinine: 1.08 mg/dL (04-08-24 @ 03:49)  Creatinine: 1.09 mg/dL (04-07-24 @ 06:32)  Creatinine: 1.11 mg/dL (04-06-24 @ 07:28)  Creatinine: 0.98 mg/dL (04-05-24 @ 11:59)  Creatinine: 0.95 mg/dL (04-04-24 @ 07:16)  Creatinine: 1.12 mg/dL (04-03-24 @ 07:17)  Creatinine: 1.18 mg/dL (04-02-24 @ 17:43)      LIVER FUNCTIONS - ( 08 Apr 2024 03:49 )  Alb: 3.0 g/dL / Pro: 6.5 g/dL / ALK PHOS: 75 U/L / ALT: 13 U/L / AST: 10 U/L / GGT: x           Vancomycin Level, Trough: 8.5 ug/mL (04-04-24 @ 04:44)    Urinalysis Basic - ( 08 Apr 2024 03:49 )    Color: x / Appearance: x / SG: x / pH: x  Gluc: 282 mg/dL / Ketone: x  / Bili: x / Urobili: x   Blood: x / Protein: x / Nitrite: x   Leuk Esterase: x / RBC: x / WBC x   Sq Epi: x / Non Sq Epi: x / Bacteria: x      Microbiology: reviewed     Culture - Abscess with Gram Stain (collected 04-02-24 @ 17:40)  Source: .Abscess left foot  Gram Stain (04-03-24 @ 05:56):    No polymorphonuclear leukocytes seen per low power field    Numerous Gram positive cocci in pairs seen per oil power field  Final Report (04-04-24 @ 23:56):    Few Klebsiella oxytoca/Raoultella ornithinolytica    Few Streptococcus mitis/oralis group "Susceptibilities not performed"    Moderate Peptoniphilus harei group "Susceptibilities not performed"    Rare Staphylococcus pettenkoferi "Susceptibilities not performed"  Organism: Klebsiella oxytoca /Raoutella ornithinolytica (04-04-24 @ 23:56)  Organism: Klebsiella oxytoca /Raoutella ornithinolytica (04-04-24 @ 23:56)      Method Type: TARIQ      -  Amoxicillin/Clavulanic Acid: S <=8/4      -  Ampicillin: R >16 These ampicillin results predict results for amoxicillin      -  Ampicillin/Sulbactam: S 8/4      -  Aztreonam: S <=4      -  Cefazolin: R >16      -  Cefepime: S <=2      -  Cefoxitin: S <=8      -  Ceftriaxone: S <=1      -  Ciprofloxacin: S <=0.25      -  Ertapenem: S <=0.5      -  Gentamicin: S <=2      -  Imipenem: S <=1      -  Levofloxacin: S <=0.5      -  Meropenem: S <=1      -  Piperacillin/Tazobactam: S <=8      -  Tobramycin: S <=2      -  Trimethoprim/Sulfamethoxazole: S <=0.5/9.5    Culture - Blood (collected 04-02-24 @ 17:24)  Source: .Blood Blood-Peripheral  Final Report (04-07-24 @ 23:01):    No growth at 5 days    Culture - Blood (collected 04-02-24 @ 17:17)  Source: .Blood Blood-Peripheral  Final Report (04-07-24 @ 23:01):    No growth at 5 days          Radiology: reviewed     Medications:  acetaminophen     Tablet .. 650 milliGRAM(s) Oral every 6 hours PRN  albuterol    90 MICROgram(s) HFA Inhaler 2 Puff(s) Inhalation every 6 hours PRN  aspirin  chewable 81 milliGRAM(s) Oral daily  chlorhexidine 2% Cloths 1 Application(s) Topical <User Schedule>  clopidogrel Tablet 75 milliGRAM(s) Oral daily  dextrose 5%. 1000 milliLiter(s) IV Continuous <Continuous>  dextrose 5%. 1000 milliLiter(s) IV Continuous <Continuous>  dextrose 50% Injectable 25 Gram(s) IV Push once  dextrose 50% Injectable 25 Gram(s) IV Push once  dextrose 50% Injectable 12.5 Gram(s) IV Push once  dextrose Oral Gel 15 Gram(s) Oral once PRN  enoxaparin Injectable 40 milliGRAM(s) SubCutaneous every 24 hours  glucagon  Injectable 1 milliGRAM(s) IntraMuscular once  influenza   Vaccine 0.5 milliLiter(s) IntraMuscular once  insulin lispro (ADMELOG) corrective regimen sliding scale   SubCutaneous every 6 hours  insulin lispro (ADMELOG) corrective regimen sliding scale   SubCutaneous three times a day before meals  insulin lispro (ADMELOG) corrective regimen sliding scale   SubCutaneous at bedtime  lactated ringers. 1000 milliLiter(s) IV Continuous <Continuous>  piperacillin/tazobactam IVPB.. 3.375 Gram(s) IV Intermittent every 8 hours    Current Antimicrobials:  piperacillin/tazobactam IVPB.. 3.375 Gram(s) IV Intermittent every 8 hours    Prior/Completed Antimicrobials:  cefepime   IVPB  piperacillin/tazobactam IVPB.  piperacillin/tazobactam IVPB.-  vancomycin  IVPB.

## 2024-04-08 NOTE — PROGRESS NOTE ADULT - ASSESSMENT
Mr. Menon is a 59 year old male active smoker with PMHx of  peripheral neuropathy, Type II DM,  anemia,  status post colectomy with colostomy 01/2024  due to rectal cancer under the care of Dr. Renny Purvis now admitted with osteomyelitis of the left foot.  MRI foot shows Findings suggestive of early osteomyelitis at the fifth metatarsal head and base of the proximal phalanx, deep to the soft tissue wound. Focal osteitis is also a differential consideration for the osseous findings. Podiatry pending resection with graft. Infectious disease consulted.    Labs show hemoglobin 11.6, hematocrit  34.7, MCV 83.6 platelet count 339, WBC 12.14 with neutrophilic predominance. Creatinine 1.12 with normal liver function.  HbA1c 6.8.  TTE with EF 69%.     Patient last seen in Hematology Oncology Clinic 04/02/2024  and oxaliplatin was discontinued due to peripheral neuropathy. Podiatry scheduled pt for left foot fifth metatarsal head resection with wound debridement and graft application 04/04/2024       # Stage IV Rectal Adenocarcinoma   - Diagnosed 08/2023  - Invasive, moderately differentiated colonic adenocarcinoma  - Low probability of MSI-high  - HER-2-Mary Negative  - KRAS is Wild Type  - Metastatic to liver  - FOLFOX and weekly Cetuximab started 9/18/23  - PET-CT from 03/20/2024 shows that the patient is status post surgery with no abnormal FDG uptake in the pelvis postsurgical area.  More prominent uptake in the gallbladder fossa (early recurrence versus normal fluctuation of uptake ) and no other FDG avid lesion in the field-of-view.  Previously noted liver Mets are not presently seen.  - Was scheduled to receive 5FU, Leucovorin and Cetuximab 04/02/2024, therapy held due to concern for L foot infection and pt sent to Podiatry   - Plan to continue therapy once able to from infection standpoint, pt is aware     # Left Foot Osteomyelitis  # PVD  - Podiatry scheduled pt for left foot fifth metatarsal head resection with wound debridement and graft application, delayed  - Vascular surgery DAMIEN suggestive of moderate arterial flow limitation in the left lower extremity localizing to the iliofemoral distribution, pending angiogram possible angioplasty and stent as required.\  --DAMIEN LLE with 0.65 indicating moderate lower extremity arterial vascular disease.  - Antibiotics per ID and primary team     # Neutrophilia   - Due to infection  - Outpatient labs 04/02/2024 WBC 16.19 ANC 14,170  - Continue to monitor, improving     # Normocytic anemia  - Likely multifactorial, active infection, cancer, chemotherapy, dilutional   - Outpatient labs 04/02/2024 Hb 13.7, MCV 83.5   - Continue to monitor  - Transfuse to maintain Hb > 7     # Nicotine dependence, active cigarette smoker  - Counseling provided, pt aware of possible compromised wound healing       Thank you for allowing me to participate in the care of Mr. Menon, please do not hesitate to call or text me if you have further questions or concerns.     Gato Horvath MD  [facility]   Division of Hematology/Oncology  [address] [place] [address], [address]  [place] [address]  P: [phone]  F: [phone]    Attestation:    ----Pt evaluated including face-to-face interaction in addition to chart review, reviewing treatment plan, and managing the patient’s chronic diagnoses as listed in the assessment---- Mr. Menon is a 59 year old male active smoker with PMHx of  peripheral neuropathy, Type II DM,  anemia,  status post colectomy with colostomy 01/2024  due to rectal cancer under the care of Dr. Renny Purvis now admitted with osteomyelitis of the left foot.  MRI foot shows Findings suggestive of early osteomyelitis at the fifth metatarsal head and base of the proximal phalanx, deep to the soft tissue wound. Focal osteitis is also a differential consideration for the osseous findings. Podiatry pending resection with graft. Infectious disease consulted.    Labs show hemoglobin 11.6, hematocrit  34.7, MCV 83.6 platelet count 339, WBC 12.14 with neutrophilic predominance. Creatinine 1.12 with normal liver function.  HbA1c 6.8.  TTE with EF 69%.     Patient last seen in Hematology Oncology Clinic 04/02/2024  and oxaliplatin was discontinued due to peripheral neuropathy. Podiatry scheduled pt for left foot fifth metatarsal head resection with wound debridement and graft application 04/04/2024, DAMIEN concerning, vascular workup pending       # Stage IV Rectal Adenocarcinoma   - Diagnosed 08/2023  - Invasive, moderately differentiated colonic adenocarcinoma  - Low probability of MSI-high  - HER-2-Mary Negative  - KRAS is Wild Type  - Metastatic to liver  - FOLFOX and weekly Cetuximab started 9/18/23  - PET-CT from 03/20/2024 shows that the patient is status post surgery with no abnormal FDG uptake in the pelvis postsurgical area.  More prominent uptake in the gallbladder fossa (early recurrence versus normal fluctuation of uptake ) and no other FDG avid lesion in the field-of-view.  Previously noted liver Mets are not presently seen.  - Was scheduled to receive 5FU, Leucovorin and Cetuximab 04/02/2024, therapy held due to concern for L foot infection and pt sent to Podiatry   - Plan to continue therapy once able to from infection standpoint, pt is aware     # Left Foot Osteomyelitis  # PVD  - Podiatry scheduled pt for left foot fifth metatarsal head resection with wound debridement and graft application, delayed  - Vascular surgery DAMIEN suggestive of moderate arterial flow limitation in the left lower extremity localizing to the iliofemoral distribution, pending angiogram possible angioplasty and stent as required  --DAMIEN LLE with 0.65 indicating moderate lower extremity arterial vascular disease.  - Antibiotics per ID and primary team     # Neutrophilia   - Due to infection  - Outpatient labs 04/02/2024 WBC 16.19 ANC 14,170  - Continue to monitor, improving     # Normocytic anemia  - Likely multifactorial, active infection, cancer, chemotherapy, dilutional   - Outpatient labs 04/02/2024 Hb 13.7, MCV 83.5   - Continue to monitor  - Transfuse to maintain Hb > 7     # Nicotine dependence, active cigarette smoker  - Counseling provided, pt aware of possible compromised wound healing       Thank you for allowing me to participate in the care of Mr. Menon, please do not hesitate to call or text me if you have further questions or concerns.     Gato Horvath MD  [facility]   Division of Hematology/Oncology  [address] [place] [address], [address]  [place] [address]  P: [phone]  F: [phone]    Attestation:    ----Pt evaluated including face-to-face interaction in addition to chart review, reviewing treatment plan, and managing the patient’s chronic diagnoses as listed in the assessment----

## 2024-04-08 NOTE — PROGRESS NOTE ADULT - SUBJECTIVE AND OBJECTIVE BOX
Vascular Surgery Progress Note     Subjective:  Patient seen and examined.       Vital Signs:  Vital Signs Last 24 Hrs  T(C): 36.6 (08 Apr 2024 07:45), Max: 37.3 (07 Apr 2024 21:32)  T(F): 97.9 (08 Apr 2024 07:45), Max: 99.1 (07 Apr 2024 21:32)  HR: 67 (08 Apr 2024 10:30) (67 - 76)  BP: 162/77 (08 Apr 2024 10:30) (113/64 - 162/77)  RR: 18 (08 Apr 2024 10:30) (16 - 20)  SpO2: 99% (08 Apr 2024 10:30) (95% - 99%)    Parameters below as of 08 Apr 2024 10:30  Patient On (Oxygen Delivery Method): room air        CAPILLARY BLOOD GLUCOSE      POCT Blood Glucose.: 205 mg/dL (08 Apr 2024 07:18)  POCT Blood Glucose.: 258 mg/dL (08 Apr 2024 05:51)  POCT Blood Glucose.: 179 mg/dL (07 Apr 2024 21:40)  POCT Blood Glucose.: 197 mg/dL (07 Apr 2024 17:09)  POCT Blood Glucose.: 234 mg/dL (07 Apr 2024 12:18)      I&O's Detail    07 Apr 2024 07:01  -  08 Apr 2024 07:00  --------------------------------------------------------  IN:    IV PiggyBack: 100 mL    Lactated Ringers: 600 mL    Oral Fluid: 360 mL  Total IN: 1060 mL    OUT:    Colostomy (mL): 100 mL  Total OUT: 100 mL    Total NET: 960 mL            Physical Exam:  General: not acutely distressed  Respiratory: nonlabored respirations  Cardiac: pulse present  Extremities: warm, motor function and sensation intact. Left fifth toe with dry, necrotic wound at base of toe, nontender, without drainage      Labs:    04-08    136  |  99  |  22  ----------------------------<  282<H>  4.4   |  26  |  1.08    Ca    9.2      08 Apr 2024 03:49  Phos  3.5     04-08  Mg     1.8     04-08    TPro  6.5  /  Alb  3.0<L>  /  TBili  0.2  /  DBili  x   /  AST  10  /  ALT  13  /  AlkPhos  75  04-08    LIVER FUNCTIONS - ( 08 Apr 2024 03:49 )  Alb: 3.0 g/dL / Pro: 6.5 g/dL / ALK PHOS: 75 U/L / ALT: 13 U/L / AST: 10 U/L / GGT: x                                 10.4   10.43 )-----------( 433      ( 08 Apr 2024 03:50 )             31.8     PT/INR - ( 08 Apr 2024 03:49 )   PT: 12.9 sec;   INR: 1.18 ratio         PTT - ( 08 Apr 2024 03:49 )  PTT:33.8 sec

## 2024-04-08 NOTE — PROGRESS NOTE ADULT - ASSESSMENT
59M active smoker (48-pack-year history) hx stage IV colon cancer, s/p colectomy with ostomy 1/2024, peripheral neuropathy 2/2 neoadjuvant chemo, sent in from his podiatrist's office with concern for osteomyelitis of the left foot. Patient was previously unaware of any infection, but noticed a wound on his left foot before visiting his oncologist on Tuesday, when he had been due to start adjuvant chemotherapy. His oncologist sent him to the ED for evaluation. Patient denies any hx of foot wounds and has never seen a vascular surgeon. He ambulates without difficulty and denies claudication or rest pain. States he did not have any foot wounds one-three weeks ago. Patient with xray negative for left toe OM, however MRI sf left foot 5th met head OM, 5th proximal phalanx base OM. S/p incision and drainage of wound in ED by podiatry, now planning for left foot 5th metatarsal head resection with graft application vs LWC, pending vascular surgery recommendations. DAMIEN PVR 0.65 on left, left toe 0.30, cf moderate arterial flow limitation in the left lower extremity localizing to the iliofemoral distribution.    Recommendations:  -plan for LLE angiogram today  -medical and cardiac clearance appreciated for LLE angiogram.  -NPO  - IV abx  -appreciate care per primary team    Vascular Surgery   [phone]

## 2024-04-08 NOTE — PROGRESS NOTE ADULT - SUBJECTIVE AND OBJECTIVE BOX
DATE OF SERVICE: 04-08-24 @ 14:18    Patient is a 59y old  Male who presents with a chief complaint of Sent to the ER by PCP following concern for LEFT foot infection (08 Apr 2024 10:36)      INTERVAL HISTORY: Feels ok.     REVIEW OF SYSTEMS:  CONSTITUTIONAL: No weakness  EYES/ENT: No visual changes;  No throat pain   NECK: No pain or stiffness  RESPIRATORY: No cough, wheezing; No shortness of breath  CARDIOVASCULAR: No chest pain or palpitations  GASTROINTESTINAL: No abdominal  pain. No nausea, vomiting, or hematemesis  GENITOURINARY: No dysuria, frequency or hematuria  NEUROLOGICAL: No stroke like symptoms  SKIN: No rashes    	  MEDICATIONS:        PHYSICAL EXAM:  T(C): 36.6 (04-08-24 @ 07:45), Max: 37.3 (04-07-24 @ 21:32)  HR: 64 (04-08-24 @ 11:15) (63 - 76)  BP: 156/72 (04-08-24 @ 11:15) (113/64 - 162/77)  RR: 16 (04-08-24 @ 11:15) (16 - 18)  SpO2: 99% (04-08-24 @ 11:15) (95% - 99%)  Wt(kg): --  I&O's Summary    07 Apr 2024 07:01  -  08 Apr 2024 07:00  --------------------------------------------------------  IN: 1060 mL / OUT: 100 mL / NET: 960 mL      Height (cm): 185.4 (04-08 @ 04:21)  Weight (kg): 83.5 (04-08 @ 04:21)  BMI (kg/m2): 24.3 (04-08 @ 04:21)  BSA (m2): 2.08 (04-08 @ 04:21)    Appearance: In no distress	  HEENT:    PERRL, EOMI	  Cardiovascular:  S1 S2, No JVD  Respiratory: Lungs clear to auscultation	  Gastrointestinal:  Soft, Non-tender, + BS	  Vascularature:  No edema of LE  Psychiatric: Appropriate affect   Neuro: no acute focal deficits                               10.4   10.43 )-----------( 433      ( 08 Apr 2024 03:50 )             31.8     04-08    136  |  99  |  22  ----------------------------<  282<H>  4.4   |  26  |  1.08    Ca    9.2      08 Apr 2024 03:49  Phos  3.5     04-08  Mg     1.8     04-08    TPro  6.5  /  Alb  3.0<L>  /  TBili  0.2  /  DBili  x   /  AST  10  /  ALT  13  /  AlkPhos  75  04-08        Labs personally reviewed      ASSESSMENT/PLAN: 	    This is a 59 year old male PMH: Colon ca, Tobacco dependance.  Admitted for left foot pain. + Ulcer      1. Preop Risk Stratification - TTE unremarkable with preserved EF  - EKG non ischemic   - Reports good functional capacity prior to diabetic foot ulcer  - Elevated risk for mod risk LE angio and pods surgery, no contraindication to proceed  - s/p peripheral angio with BMS placement.   - L foot 5th metatarsal head resection with graft application scheduled Wed    2.   Diabetic foot ulcer  - MRI foot  suggestive of early osteomyelitis   - C/w local wound care per Pod  - Abx as per primary team  - POD following-> Plan for left foot 5th met head resection    3.  Dm2:  - HgbA1c 6.8    4 DVT ppx:  - Lovenox sq              GALA Medina-NP   Nuno Yeager DO [facility]  Cardiovascular Medicine  [address] [place] [address], [address]  Available through call or text on [employer]  Office: [phone]

## 2024-04-08 NOTE — PROGRESS NOTE ADULT - PROBLEM SELECTOR PLAN 1
In setting of diabetic foot wound. MRI demonstrating early osteomyelitis of L foot 5th metatarsal head and base of proximal phalanx.  - DAMIEN demonstrating LLE PAD  - BCx: NGTD  - Wound Cx: Klebsiella   - Podiatry following: OR for L foot 5th metatarsal head resection with graft application 4/10 pending vascular  - Vascular surgery following: LLE angiogram with possible stent 4/8  - ID following: switch to Zosyn

## 2024-04-08 NOTE — PROGRESS NOTE ADULT - ASSESSMENT
Patient is a 59 year old male with PMH of stage IV colon cancer, s/p colectomy with ostomy 1/2024, peripheral neuropathy sent in from his podiatrist office with concern for osteomyelitis of the left foot.     L foot submet 5th wound with suspected early OM  Leukocytosis likely due to above   - noted L foot submet 5 wound to bone, fibronecrotic wound bed, tracking 1cm circumferentially malodorous, serous drainage, erythema to midfoot. S/p wound explored and 1cm incision proximal to wound down to level of subq, mild serous drainage expressed   - L foot wound culture polymicrobial -- Kleb oxytoca/Roultella ornithinolytica; Strep mitis/oralis grp, Peptoniphilus harei grp, Staph pettenkoferi  - MRI L foot suggestive of early OM at 5th metatarsal head and base of proximal phalanx, deep to soft tissue wound   - Bcx NGTD x2   - MRSA PCR screen negative, s/p vancomycin   - afebrile, WBC noted, elevated ESR/CRP (99/140)    Recommendations:   Vascular surgery following -- s/p LLE angiogram today   Podiatry following - plan L foot 5th met head resection with graft application pending above  Continue zosyn 3.375g IV Q8h   Continue local wound care    Monitor temps/WBC      Cherelle Horvath M.D.  [facility], Division of Infectious Diseases  [phone]  After 5pm on weekdays and all day on weekends - please call [phone]

## 2024-04-08 NOTE — PROGRESS NOTE ADULT - SUBJECTIVE AND OBJECTIVE BOX
EDA CHAWLA is a 59y Male active smoker with a hx of Stage IV rectal adenocarcinoma s/p colectomy with colostomy (1/2024) and T2DM c/b peripheral neuropathy who presented from PCP concerning for L foot infection, found to have L fifth metatarsal osteomyelitis.     INTERVAL HX:  - no acute events overnight  - LLE angiogram today    Review of Systems: Focused ROS negative other that those stated above.    Allergies:  No Known Allergies    Medications:  acetaminophen     Tablet .. 650 milliGRAM(s) Oral every 6 hours PRN  albuterol    90 MICROgram(s) HFA Inhaler 2 Puff(s) Inhalation every 6 hours PRN  chlorhexidine 2% Cloths 1 Application(s) Topical <User Schedule>  dextrose 5%. 1000 milliLiter(s) IV Continuous <Continuous>  dextrose 5%. 1000 milliLiter(s) IV Continuous <Continuous>  dextrose 50% Injectable 12.5 Gram(s) IV Push once  dextrose 50% Injectable 25 Gram(s) IV Push once  dextrose 50% Injectable 25 Gram(s) IV Push once  dextrose Oral Gel 15 Gram(s) Oral once PRN  enoxaparin Injectable 40 milliGRAM(s) SubCutaneous every 24 hours  glucagon  Injectable 1 milliGRAM(s) IntraMuscular once  influenza   Vaccine 0.5 milliLiter(s) IntraMuscular once  insulin lispro (ADMELOG) corrective regimen sliding scale   SubCutaneous three times a day before meals  insulin lispro (ADMELOG) corrective regimen sliding scale   SubCutaneous at bedtime  insulin lispro (ADMELOG) corrective regimen sliding scale   SubCutaneous every 6 hours  lactated ringers. 1000 milliLiter(s) IV Continuous <Continuous>  piperacillin/tazobactam IVPB.. 3.375 Gram(s) IV Intermittent every 8 hours    Vitals:  T(C): 36.6 (04-08-24 @ 07:45), Max: 37.3 (04-07-24 @ 21:32)  HR: 69 (04-08-24 @ 07:45) (69 - 76)  BP: 118/78 (04-08-24 @ 07:45) (113/64 - 126/73)  RR: 16 (04-08-24 @ 07:45) (16 - 20)  SpO2: 98% (04-08-24 @ 07:45) (95% - 98%)    I/Os:    04-07-24 @ 07:01 - 04-08-24 @ 07:00  --------------------------------------------------------  IN: 360 mL / OUT: 0 mL / NET: 360 mL    Physical Exam:  CONSTITUTIONAL: no apparent distress.  SKIN: No rashes or ecchymosis.  EYES: PERRLA. EOMI. No scleral icterus.  ENT: Supple. No discharge or glossitis. Oral mucosa with moist membranes. Hearing intact bilaterally.  CV: RRR. Normal S1 and S2. No murmurs, rubs, or gallops. No edema. Pulses equal bilaterally.  PULM: Clear to auscultation throughout. Respirations unlabored. No wheezing, rales, or rhonchi.  GI: Soft, non-tender, non-distended. No palpable masses.  MSK: (+) L foot wrapped. No cyanosis or clubbing.  NEURO: CN grossly normal  PSYCH: A+O, appropriately communicating and responding to questions    Labs:                        10.4   10.43 )-----------( 433      ( 08 Apr 2024 03:50 )             31.8     04-08    136  |  99  |  22  ----------------------------<  282<H>  4.4   |  26  |  1.08    Ca    9.2      08 Apr 2024 03:49  Phos  3.5     04-08  Mg     1.8     04-08    TPro  6.5  /  Alb  3.0<L>  /  TBili  0.2  /  DBili  x   /  AST  10  /  ALT  13  /  AlkPhos  75  04-08    PT/INR - ( 08 Apr 2024 03:49 )   PT: 12.9 sec;   INR: 1.18 ratio    PTT - ( 08 Apr 2024 03:49 )  PTT:33.8 sec    Radiology/Procedures: Reviewed.  VA Physiol Extremity Lower 3+ Level, BI 04.03.24  IMPRESSION: Moderate arterial flow limitation in the left lower extremity localizing to the iliofemoral distribution. Consider lower extremity arterial duplex study for further evaluation.    MR Foot w/wo IV Cont, Left 04.02.24  IMPRESSION:  Findings suggestive of early osteomyelitis at the fifth metatarsal head and base of the proximal phalanx, deep to the soft tissue wound. Focal osteitis is also a differential consideration for the osseous findings..    Xray Chest 1 View AP/PA 04.02.24  IMPRESSION:  No radiographic evidence of acute cardiopulmonary disease. Right-sided chest wall port with tip in proper position.    Xray Foot AP + Lateral + Oblique, Left 04.02.24  IMPRESSION:  No tracking soft tissue gas collections, radiopaque foreign bodies, or gross radiographic evidence for osteomyelitis.  No fractures or dislocations.  Tarsometatarsal alignment maintained without evidence for a Lisfranc injury.  Congenitally fused 5th DIP joint. Preserved remaining visualized joint spaces and no joint margin erosions.  No lytic or blastic lesions.    TTE W or WO Ultrasound Enhancing Agent 04.03.24  CONCLUSIONS:   1. Left ventricular cavity is normal in size. Left ventricular wall thickness is normal. Left ventricular systolic function is normal with an ejection fraction of 69 % by Barraza's method of disks. There are no regional wall motion abnormalities seen.   2. Normal left ventricular diastolic function, with normal filling pressure.   3. Normal right ventricular cavity size, with normal wall thickness, and normal systolic function. Tricuspid annular plane systolic excursion (TAPSE) is 1.8 cm (normal >=1.7 cm).   4. The right atrium is normal in size.   5. Normal left and right atrial size.   6. No significant valvular disease.   7. No pericardial effusion seen.   8. Left ventricular global longitudinal strain is -21.3 % which is normal (< -18%). Images were acquired on a [employer] ultrasound system and processed using Imnish strain analysis software with a heart rate of 80 bpm and a blood pressure of 123/72 mmHg.   9. No prior echocardiogram is available for comparison.  10. There is normal LV mass andconcentric remodeling.

## 2024-04-09 LAB
ANION GAP SERPL CALC-SCNC: 9 MMOL/L — SIGNIFICANT CHANGE UP (ref 5–17)
BUN SERPL-MCNC: 18 MG/DL — SIGNIFICANT CHANGE UP (ref 7–23)
CALCIUM SERPL-MCNC: 8.9 MG/DL — SIGNIFICANT CHANGE UP (ref 8.4–10.5)
CHLORIDE SERPL-SCNC: 101 MMOL/L — SIGNIFICANT CHANGE UP (ref 96–108)
CO2 SERPL-SCNC: 25 MMOL/L — SIGNIFICANT CHANGE UP (ref 22–31)
CREAT SERPL-MCNC: 1.07 MG/DL — SIGNIFICANT CHANGE UP (ref 0.5–1.3)
EGFR: 80 ML/MIN/1.73M2 — SIGNIFICANT CHANGE UP
GLUCOSE SERPL-MCNC: 164 MG/DL — HIGH (ref 70–99)
HCT VFR BLD CALC: 32.7 % — LOW (ref 39–50)
HGB BLD-MCNC: 11 G/DL — LOW (ref 13–17)
MAGNESIUM SERPL-MCNC: 1.8 MG/DL — SIGNIFICANT CHANGE UP (ref 1.6–2.6)
MCHC RBC-ENTMCNC: 28.1 PG — SIGNIFICANT CHANGE UP (ref 27–34)
MCHC RBC-ENTMCNC: 33.6 GM/DL — SIGNIFICANT CHANGE UP (ref 32–36)
MCV RBC AUTO: 83.4 FL — SIGNIFICANT CHANGE UP (ref 80–100)
NRBC # BLD: 0 /100 WBCS — SIGNIFICANT CHANGE UP (ref 0–0)
PHOSPHATE SERPL-MCNC: 3.4 MG/DL — SIGNIFICANT CHANGE UP (ref 2.5–4.5)
PLATELET # BLD AUTO: 459 K/UL — HIGH (ref 150–400)
POTASSIUM SERPL-MCNC: 4.2 MMOL/L — SIGNIFICANT CHANGE UP (ref 3.5–5.3)
POTASSIUM SERPL-SCNC: 4.2 MMOL/L — SIGNIFICANT CHANGE UP (ref 3.5–5.3)
RBC # BLD: 3.92 M/UL — LOW (ref 4.2–5.8)
RBC # FLD: 11.9 % — SIGNIFICANT CHANGE UP (ref 10.3–14.5)
SODIUM SERPL-SCNC: 135 MMOL/L — SIGNIFICANT CHANGE UP (ref 135–145)
WBC # BLD: 13.41 K/UL — HIGH (ref 3.8–10.5)
WBC # FLD AUTO: 13.41 K/UL — HIGH (ref 3.8–10.5)

## 2024-04-09 PROCEDURE — 93010 ELECTROCARDIOGRAM REPORT: CPT

## 2024-04-09 PROCEDURE — 71045 X-RAY EXAM CHEST 1 VIEW: CPT | Mod: 26

## 2024-04-09 RX ORDER — MAGNESIUM SULFATE 500 MG/ML
2 VIAL (ML) INJECTION ONCE
Refills: 0 | Status: COMPLETED | OUTPATIENT
Start: 2024-04-09 | End: 2024-04-09

## 2024-04-09 RX ADMIN — Medication 25 GRAM(S): at 12:27

## 2024-04-09 RX ADMIN — ENOXAPARIN SODIUM 40 MILLIGRAM(S): 100 INJECTION SUBCUTANEOUS at 21:11

## 2024-04-09 RX ADMIN — Medication 81 MILLIGRAM(S): at 12:26

## 2024-04-09 RX ADMIN — PIPERACILLIN AND TAZOBACTAM 25 GRAM(S): 4; .5 INJECTION, POWDER, LYOPHILIZED, FOR SOLUTION INTRAVENOUS at 05:31

## 2024-04-09 RX ADMIN — PIPERACILLIN AND TAZOBACTAM 25 GRAM(S): 4; .5 INJECTION, POWDER, LYOPHILIZED, FOR SOLUTION INTRAVENOUS at 13:33

## 2024-04-09 RX ADMIN — CLOPIDOGREL BISULFATE 75 MILLIGRAM(S): 75 TABLET, FILM COATED ORAL at 12:26

## 2024-04-09 RX ADMIN — PIPERACILLIN AND TAZOBACTAM 25 GRAM(S): 4; .5 INJECTION, POWDER, LYOPHILIZED, FOR SOLUTION INTRAVENOUS at 21:11

## 2024-04-09 NOTE — PROGRESS NOTE ADULT - SUBJECTIVE AND OBJECTIVE BOX
Patient is a 59y old  Male who presents with a chief complaint of Sent to the ER by PCP following concern for LEFT foot infection (09 Apr 2024 07:13)       INTERVAL HPI/OVERNIGHT EVENTS:  Patient seen and evaluated at bedside.  Pt is resting comfortable in NAD. Denies N/V/F/C.    Allergies    No Known Allergies    Intolerances        Vital Signs Last 24 Hrs  T(C): 37.2 (09 Apr 2024 05:28), Max: 37.2 (09 Apr 2024 05:28)  T(F): 99 (09 Apr 2024 05:28), Max: 99 (09 Apr 2024 05:28)  HR: 80 (09 Apr 2024 05:28) (63 - 81)  BP: 135/65 (09 Apr 2024 05:28) (118/78 - 162/77)  BP(mean): --  RR: 18 (09 Apr 2024 05:28) (16 - 18)  SpO2: 97% (09 Apr 2024 05:28) (97% - 99%)    Parameters below as of 09 Apr 2024 05:28  Patient On (Oxygen Delivery Method): room air        LABS:                        10.4   10.43 )-----------( 433      ( 08 Apr 2024 03:50 )             31.8     04-08    136  |  99  |  22  ----------------------------<  282<H>  4.4   |  26  |  1.08    Ca    9.2      08 Apr 2024 03:49  Phos  3.5     04-08  Mg     1.8     04-08    TPro  6.5  /  Alb  3.0<L>  /  TBili  0.2  /  DBili  x   /  AST  10  /  ALT  13  /  AlkPhos  75  04-08    PT/INR - ( 08 Apr 2024 03:49 )   PT: 12.9 sec;   INR: 1.18 ratio         PTT - ( 08 Apr 2024 03:49 )  PTT:33.8 sec  Urinalysis Basic - ( 08 Apr 2024 03:49 )    Color: x / Appearance: x / SG: x / pH: x  Gluc: 282 mg/dL / Ketone: x  / Bili: x / Urobili: x   Blood: x / Protein: x / Nitrite: x   Leuk Esterase: x / RBC: x / WBC x   Sq Epi: x / Non Sq Epi: x / Bacteria: x      CAPILLARY BLOOD GLUCOSE          Lower Extremity Physical Exam:  Vascular: DP/PT 2/4, B/L, CFT <3  seconds B/L, Temperature gradient warm to cool B/L.   Neuro: Epicritic sensation diminished to the level of digits, B/L.  Musculoskeletal/Ortho: unremarkable   Skin: Left foot submet 5 wound to bone, fibronecrotic wound bed, tracking 1cm circumferentially, no malodor, no drainage, erythema to midfoot resolving. Right foot no open wounds, no acute signs of infection.     RADIOLOGY & ADDITIONAL TESTS:

## 2024-04-09 NOTE — PROGRESS NOTE ADULT - PROBLEM SELECTOR PLAN 1
In setting of diabetic foot wound. MRI demonstrating early osteomyelitis of L foot 5th metatarsal head and base of proximal phalanx.  - DAMIEN demonstrating LLE PAD  - BCx: NGTD  - Wound Cx: Klebsiella   - Podiatry following: OR for L foot 5th metatarsal head resection with graft application 4/10 pending vascular  - Vascular surgery following: LLE angiogram with possible stent 4/8  - ID following: switch to Zosyn In setting of diabetic foot wound. MRI demonstrating early osteomyelitis of L foot 5th metatarsal head and base of proximal phalanx.  - DAMIEN demonstrating LLE PAD  - BCx: NGTD  - Wound Cx: Klebsiella   - Podiatry following: OR for L foot 5th metatarsal head resection with graft application 4/10 pending vascular  - Vascular surgery following: LLE angiogram with possible stent 4/8  - ID following: switch to Zosyn  - WBC elevated today will CTM and appreciate id recs; afebrile

## 2024-04-09 NOTE — PROGRESS NOTE ADULT - ASSESSMENT
Patient is a 59 year old male with PMH of stage IV colon cancer, s/p colectomy with ostomy 1/2024, peripheral neuropathy sent in from his podiatrist office with concern for osteomyelitis of the left foot.     L foot submet 5th wound with suspected early OM  Leukocytosis likely due to above   - s/p L foot wound explored by Podiatry - 1cm incision proximal to wound down to level of subq, mild serous drainage  - L foot Wcx - polymicrobial - Kleb oxytoca/Roultella ornithinolytica; Strep mitis/oralis grp, Peptoniphilus harei grp, Staph pettenkoferi  - MRI L foot suggestive of early OM at 5th metatarsal head and base of proximal phalanx, deep to soft tissue wound   - MRSA PCR screen negative, s/p vancomycin   - Bcx NGTD x2   - 4/8 s/p LLE angiogram, balloon angioplasty and stent to SFA  - afebrile, WBC noted, elevated ESR/CRP (99/140)    Recommendations:   Podiatry following - plan L foot 5th met head resection with graft application 4/11 am  -please send bone culture and biopsy   Continue zosyn 3.375g IV Q8h   Continue local wound care    Monitor temps/WBC      Cherelle Horvath M.D.  [facility], Division of Infectious Diseases  [phone]  After 5pm on weekdays and all day on weekends - please call [phone]

## 2024-04-09 NOTE — PROGRESS NOTE ADULT - ASSESSMENT
59M with left foot submet 5 wound to bone and cellulitis.   - Pt was seen and evaluated.   - Afebrile, no leukocytosis   - Left foot submet 5 wound to bone, fibronecrotic wound bed, tracking 1cm circumferentially, no malodor, no drainage, erythema to midfoot resolving.  - Left Foot X-Ray: No gas, no OM.  - Left Foot MRI: 5th met head OM, 5th proximal phalanx base OM.  - Left Foot Wound Culture: Klebsiella oxytoca/Raoultella ornithinolytica, Strep mitis/oralis, Peptoniphilus harei, Staph pettenkoferi.  - DAMIEN/PVR: RABI 1.1, LABI 0.65, RTBI 0.97, LTBI 0.3.  - Vasc recs, appreciated   - Pod Plan: Left foot 5th met head resection with graft application on Wed 4/11 at 7:30am with Dr. Call   - Documented medical and cardiac clearance for podiatric surgery under anesthesia, appreciated.   - NPO after midnight   - CBC, BMP, Coags, Type and screen in AM   - Chest xray and EKG ordered   - Discussed with attending.

## 2024-04-09 NOTE — PROGRESS NOTE ADULT - SUBJECTIVE AND OBJECTIVE BOX
Vascular Surgery Progress Note     Subjective:  Patient seen and examined.       Vital Signs:  Vital Signs Last 24 Hrs  T(C): 36.9 (09 Apr 2024 11:30), Max: 37.2 (09 Apr 2024 05:28)  T(F): 98.4 (09 Apr 2024 11:30), Max: 99 (09 Apr 2024 05:28)  HR: 76 (09 Apr 2024 11:30) (75 - 81)  BP: 128/79 (09 Apr 2024 11:30) (122/78 - 137/69)  RR: 16 (09 Apr 2024 11:30) (16 - 18)  SpO2: 99% (09 Apr 2024 11:30) (97% - 99%)    Parameters below as of 09 Apr 2024 11:30  Patient On (Oxygen Delivery Method): room air        CAPILLARY BLOOD GLUCOSE          I&O's Detail    08 Apr 2024 07:01  -  09 Apr 2024 07:00  --------------------------------------------------------  IN:    IV PiggyBack: 200 mL    Oral Fluid: 480 mL  Total IN: 680 mL    OUT:  Total OUT: 0 mL    Total NET: 680 mL      09 Apr 2024 07:01  -  09 Apr 2024 13:15  --------------------------------------------------------  IN:    Oral Fluid: 240 mL  Total IN: 240 mL    OUT:  Total OUT: 0 mL    Total NET: 240 mL            Physical Exam:  General: not acutely distressed  Respiratory: nonlabored respirations  Cardiac: pulse present  Extremities: warm, motor function and sensation intact. R groin access site soft, nontender, R femoral pulse, LLE DP pulse        Labs:    04-09    135  |  101  |  18  ----------------------------<  164<H>  4.2   |  25  |  1.07    Ca    8.9      09 Apr 2024 09:33  Phos  3.4     04-09  Mg     1.8     04-09    TPro  6.5  /  Alb  3.0<L>  /  TBili  0.2  /  DBili  x   /  AST  10  /  ALT  13  /  AlkPhos  75  04-08    LIVER FUNCTIONS - ( 08 Apr 2024 03:49 )  Alb: 3.0 g/dL / Pro: 6.5 g/dL / ALK PHOS: 75 U/L / ALT: 13 U/L / AST: 10 U/L / GGT: x                                 11.0   13.41 )-----------( 459      ( 09 Apr 2024 09:33 )             32.7     PT/INR - ( 08 Apr 2024 03:49 )   PT: 12.9 sec;   INR: 1.18 ratio         PTT - ( 08 Apr 2024 03:49 )  PTT:33.8 sec

## 2024-04-09 NOTE — PROGRESS NOTE ADULT - ASSESSMENT
Mr. Menon is a 59 year old male active smoker with PMHx of  peripheral neuropathy, Type II DM,  anemia,  status post colectomy with colostomy 01/2024  due to rectal cancer under the care of Dr. Renny Purvis now admitted with osteomyelitis of the left foot.  MRI foot shows Findings suggestive of early osteomyelitis at the fifth metatarsal head and base of the proximal phalanx, deep to the soft tissue wound. Focal osteitis is also a differential consideration for the osseous findings. Podiatry pending resection with graft. Infectious disease consulted.    Labs show hemoglobin 11.6, hematocrit  34.7, MCV 83.6 platelet count 339, WBC 12.14 with neutrophilic predominance. Creatinine 1.12 with normal liver function.  HbA1c 6.8.  TTE with EF 69%.     Patient last seen in Hematology Oncology Clinic 04/02/2024  and oxaliplatin was discontinued due to peripheral neuropathy. Podiatry scheduled pt for left foot fifth metatarsal head resection with wound debridement and graft application 04/04/2024, DAMIEN concerning, vascular workup pending       # Stage IV Rectal Adenocarcinoma   - Diagnosed 08/2023  - Invasive, moderately differentiated colonic adenocarcinoma  - Low probability of MSI-high  - HER-2-Mary Negative  - KRAS is Wild Type  - Metastatic to liver  - FOLFOX and weekly Cetuximab started 9/18/23  - PET-CT from 03/20/2024 shows that the patient is status post surgery with no abnormal FDG uptake in the pelvis postsurgical area.  More prominent uptake in the gallbladder fossa (early recurrence versus normal fluctuation of uptake ) and no other FDG avid lesion in the field-of-view.  Previously noted liver Mets are not presently seen.  - Was scheduled to receive 5FU, Leucovorin and Cetuximab 04/02/2024, therapy held due to concern for L foot infection and pt sent to Podiatry   - Plan to continue therapy once able to from infection standpoint, pt is aware     # Left Foot Osteomyelitis  # PVD  - Podiatry scheduled pt for left foot fifth metatarsal head resection with wound debridement and graft application, delayed  - Vascular surgery DAMIEN suggestive of moderate arterial flow limitation in the left lower extremity localizing to the iliofemoral distribution, pending angiogram possible angioplasty and stent as required  --DAMIEN LLE with 0.65 indicating moderate lower extremity arterial vascular disease.  - Antibiotics per ID and primary team     # Neutrophilia   - Due to infection  - Outpatient labs 04/02/2024 WBC 16.19 ANC 14,170  - Continue to monitor, improving     # Normocytic anemia  - Likely multifactorial, active infection, cancer, chemotherapy, dilutional   - Outpatient labs 04/02/2024 Hb 13.7, MCV 83.5   - Continue to monitor  - Transfuse to maintain Hb > 7     # Nicotine dependence, active cigarette smoker  - Counseling provided, pt aware of possible compromised wound healing       Thank you for allowing me to participate in the care of Mr. Menon, please do not hesitate to call or text me if you have further questions or concerns.     Gato Horvath MD  [facility]   Division of Hematology/Oncology  [address] [place] [address], [address]  [place] [address]  P: [phone]  F: [phone]    Attestation:    ----Pt evaluated including face-to-face interaction in addition to chart review, reviewing treatment plan, and managing the patient’s chronic diagnoses as listed in the assessment---- Mr. Menon is a 59 year old male active smoker with PMHx of  peripheral neuropathy, Type II DM,  anemia,  status post colectomy with colostomy 01/2024  due to rectal cancer under the care of Dr. Renny Purvis now admitted with osteomyelitis of the left foot.  MRI foot shows Findings suggestive of early osteomyelitis at the fifth metatarsal head and base of the proximal phalanx, deep to the soft tissue wound. Focal osteitis is also a differential consideration for the osseous findings. Podiatry pending resection with graft. Infectious disease consulted.    Labs show hemoglobin 11.6, hematocrit  34.7, MCV 83.6 platelet count 339, WBC 12.14 with neutrophilic predominance. Creatinine 1.12 with normal liver function.  HbA1c 6.8.  TTE with EF 69%.     Patient last seen in Hematology Oncology Clinic 04/02/2024  and oxaliplatin was discontinued due to peripheral neuropathy. Podiatry scheduled pt for left foot fifth metatarsal head resection with wound debridement and graft application 04/04/2024, DAMIEN concerning, vascular performed LLE angio, balloon angioplasty and stent to SFA 04/08/2024.       # Stage IV Rectal Adenocarcinoma   - Diagnosed 08/2023  - Invasive, moderately differentiated colonic adenocarcinoma  - Low probability of MSI-high  - HER-2-Mary Negative  - KRAS is Wild Type  - Metastatic to liver  - FOLFOX and weekly Cetuximab started 9/18/23  - PET-CT from 03/20/2024 shows that the patient is status post surgery with no abnormal FDG uptake in the pelvis postsurgical area.  More prominent uptake in the gallbladder fossa (early recurrence versus normal fluctuation of uptake ) and no other FDG avid lesion in the field-of-view.  Previously noted liver Mets are not presently seen.  - Was scheduled to receive 5FU, Leucovorin and Cetuximab 04/02/2024, therapy held due to concern for L foot infection and pt sent to Podiatry   - Plan to continue therapy once able to from infection standpoint, pt is aware     # Left Foot Osteomyelitis  # PVD  - Podiatry scheduled pt for left foot fifth metatarsal head resection with wound debridement and graft application, delayed  - Vascular surgery DAMIEN suggestive of moderate arterial flow limitation in the left lower extremity localizing to the iliofemoral distribution  --DAMIEN LLE with 0.65 indicating moderate lower extremity arterial vascular disease  - LLE angio, balloon angioplasty and stent to SFA 04/08/2024  - Antibiotics per ID and primary team     # Neutrophilia   - Due to infection  - Outpatient labs 04/02/2024 WBC 16.19 ANC 14,170  - Continue to monitor, improving     # Normocytic anemia  - Likely multifactorial, active infection, cancer, chemotherapy, dilutional   - Outpatient labs 04/02/2024 Hb 13.7, MCV 83.5   - Continue to monitor  - Transfuse to maintain Hb > 7     # Nicotine dependence, active cigarette smoker  - Counseling provided, pt aware of possible compromised wound healing       Thank you for allowing me to participate in the care of Mr. Menon, please do not hesitate to call or text me if you have further questions or concerns.     Gato Horvath MD  [facility]   Division of Hematology/Oncology  [address], [address]  [place] [address]  P: [phone]  F: [phone]    Attestation:    ----Pt evaluated including face-to-face interaction in addition to chart review, reviewing treatment plan, and managing the patient’s chronic diagnoses as listed in the assessment----

## 2024-04-09 NOTE — PROGRESS NOTE ADULT - SUBJECTIVE AND OBJECTIVE BOX
[facility] HEMATOLOGY/ONCOLOGY INPATIENT PROGRESS NOTE     Interval Hx:   04-09-24: Mr. Menon was seen at bedside today.    Meds:   MEDICATIONS  (STANDING):  aspirin  chewable 81 milliGRAM(s) Oral daily  chlorhexidine 2% Cloths 1 Application(s) Topical <User Schedule>  clopidogrel Tablet 75 milliGRAM(s) Oral daily  dextrose 5%. 1000 milliLiter(s) (50 mL/Hr) IV Continuous <Continuous>  dextrose 5%. 1000 milliLiter(s) (100 mL/Hr) IV Continuous <Continuous>  dextrose 50% Injectable 12.5 Gram(s) IV Push once  dextrose 50% Injectable 25 Gram(s) IV Push once  dextrose 50% Injectable 25 Gram(s) IV Push once  enoxaparin Injectable 40 milliGRAM(s) SubCutaneous every 24 hours  glucagon  Injectable 1 milliGRAM(s) IntraMuscular once  influenza   Vaccine 0.5 milliLiter(s) IntraMuscular once  insulin lispro (ADMELOG) corrective regimen sliding scale   SubCutaneous three times a day before meals  insulin lispro (ADMELOG) corrective regimen sliding scale   SubCutaneous at bedtime  insulin lispro (ADMELOG) corrective regimen sliding scale   SubCutaneous every 6 hours  piperacillin/tazobactam IVPB.. 3.375 Gram(s) IV Intermittent every 8 hours    MEDICATIONS  (PRN):  acetaminophen     Tablet .. 650 milliGRAM(s) Oral every 6 hours PRN Temp greater or equal to 38C (100.4F), Mild Pain (1 - 3)  albuterol    90 MICROgram(s) HFA Inhaler 2 Puff(s) Inhalation every 6 hours PRN Bronchospasm  dextrose Oral Gel 15 Gram(s) Oral once PRN Blood Glucose LESS THAN 70 milliGRAM(s)/deciliter    Vital Signs Last 24 Hrs  T(C): 37.2 (09 Apr 2024 05:28), Max: 37.2 (09 Apr 2024 05:28)  T(F): 99 (09 Apr 2024 05:28), Max: 99 (09 Apr 2024 05:28)  HR: 80 (09 Apr 2024 05:28) (63 - 81)  BP: 135/65 (09 Apr 2024 05:28) (118/78 - 162/77)  BP(mean): --  RR: 18 (09 Apr 2024 05:28) (16 - 18)  SpO2: 97% (09 Apr 2024 05:28) (97% - 99%)    Parameters below as of 09 Apr 2024 05:28  Patient On (Oxygen Delivery Method): room air    Physical Exam:  Gen: NAD  HEENT: EOMI, MMM  Chest: equal chest rise, speaking full sentences   Cardiac: RR  Abd: Nondistended  Ext: LLE/foot cellulitis, bandaged with betadine type solution   Neuro: AAOx3, normal mood and affect     Labs:                        10.4   10.43 )-----------( 433      ( 08 Apr 2024 03:50 )             31.8     CBC Full  -  ( 08 Apr 2024 03:50 )  WBC Count : 10.43 K/uL  RBC Count : 3.79 M/uL  Hemoglobin : 10.4 g/dL  Hematocrit : 31.8 %  Platelet Count - Automated : 433 K/uL  Mean Cell Volume : 83.9 fl  Mean Cell Hemoglobin : 27.4 pg  Mean Cell Hemoglobin Concentration : 32.7 gm/dL    04-08    136  |  99  |  22  ----------------------------<  282<H>  4.4   |  26  |  1.08    Ca    9.2      08 Apr 2024 03:49  Phos  3.5     04-08  Mg     1.8     04-08    TPro  6.5  /  Alb  3.0<L>  /  TBili  0.2  /  DBili  x   /  AST  10  /  ALT  13  /  AlkPhos  75  04-08    PT/INR - ( 08 Apr 2024 03:49 )   PT: 12.9 sec;   INR: 1.18 ratio         PTT - ( 08 Apr 2024 03:49 )  PTT:33.8 sec   [facility] HEMATOLOGY/ONCOLOGY INPATIENT PROGRESS NOTE     Interval Hx:   04-09-24: Mr. Menon was seen at bedside today, no acute complaints, feeling well after LLE angio, balloon angioplasty and stent to SFA 04/08/2024, no signs of bleeding     Meds:   MEDICATIONS  (STANDING):  aspirin  chewable 81 milliGRAM(s) Oral daily  chlorhexidine 2% Cloths 1 Application(s) Topical <User Schedule>  clopidogrel Tablet 75 milliGRAM(s) Oral daily  dextrose 5%. 1000 milliLiter(s) (50 mL/Hr) IV Continuous <Continuous>  dextrose 5%. 1000 milliLiter(s) (100 mL/Hr) IV Continuous <Continuous>  dextrose 50% Injectable 12.5 Gram(s) IV Push once  dextrose 50% Injectable 25 Gram(s) IV Push once  dextrose 50% Injectable 25 Gram(s) IV Push once  enoxaparin Injectable 40 milliGRAM(s) SubCutaneous every 24 hours  glucagon  Injectable 1 milliGRAM(s) IntraMuscular once  influenza   Vaccine 0.5 milliLiter(s) IntraMuscular once  insulin lispro (ADMELOG) corrective regimen sliding scale   SubCutaneous three times a day before meals  insulin lispro (ADMELOG) corrective regimen sliding scale   SubCutaneous at bedtime  insulin lispro (ADMELOG) corrective regimen sliding scale   SubCutaneous every 6 hours  piperacillin/tazobactam IVPB.. 3.375 Gram(s) IV Intermittent every 8 hours    MEDICATIONS  (PRN):  acetaminophen     Tablet .. 650 milliGRAM(s) Oral every 6 hours PRN Temp greater or equal to 38C (100.4F), Mild Pain (1 - 3)  albuterol    90 MICROgram(s) HFA Inhaler 2 Puff(s) Inhalation every 6 hours PRN Bronchospasm  dextrose Oral Gel 15 Gram(s) Oral once PRN Blood Glucose LESS THAN 70 milliGRAM(s)/deciliter    Vital Signs Last 24 Hrs  T(C): 37.2 (09 Apr 2024 05:28), Max: 37.2 (09 Apr 2024 05:28)  T(F): 99 (09 Apr 2024 05:28), Max: 99 (09 Apr 2024 05:28)  HR: 80 (09 Apr 2024 05:28) (63 - 81)  BP: 135/65 (09 Apr 2024 05:28) (118/78 - 162/77)  BP(mean): --  RR: 18 (09 Apr 2024 05:28) (16 - 18)  SpO2: 97% (09 Apr 2024 05:28) (97% - 99%)    Parameters below as of 09 Apr 2024 05:28  Patient On (Oxygen Delivery Method): room air    Physical Exam:  Gen: NAD  HEENT: EOMI, MMM  Chest: equal chest rise, speaking full sentences   Cardiac: RR  Abd: Nondistended  Ext: LLE/foot cellulitis  Neuro: AAOx3, normal mood and affect     Labs:                        10.4   10.43 )-----------( 433      ( 08 Apr 2024 03:50 )             31.8     CBC Full  -  ( 08 Apr 2024 03:50 )  WBC Count : 10.43 K/uL  RBC Count : 3.79 M/uL  Hemoglobin : 10.4 g/dL  Hematocrit : 31.8 %  Platelet Count - Automated : 433 K/uL  Mean Cell Volume : 83.9 fl  Mean Cell Hemoglobin : 27.4 pg  Mean Cell Hemoglobin Concentration : 32.7 gm/dL    04-08    136  |  99  |  22  ----------------------------<  282<H>  4.4   |  26  |  1.08    Ca    9.2      08 Apr 2024 03:49  Phos  3.5     04-08  Mg     1.8     04-08    TPro  6.5  /  Alb  3.0<L>  /  TBili  0.2  /  DBili  x   /  AST  10  /  ALT  13  /  AlkPhos  75  04-08    PT/INR - ( 08 Apr 2024 03:49 )   PT: 12.9 sec;   INR: 1.18 ratio         PTT - ( 08 Apr 2024 03:49 )  PTT:33.8 sec

## 2024-04-09 NOTE — PROGRESS NOTE ADULT - SUBJECTIVE AND OBJECTIVE BOX
**************************************  Graeme Hoffman PGY 1  After 7PM, please contact night float at #[phone] or [phone]  **************************************    INTERVAL HPI/OVERNIGHT EVENTS:  Patient was seen and examined at bedside. As per nurse and patient, no o/n events, patient resting comfortably. No complaints at this time. Patient denies: fever, chills, dizziness, weakness, HA, Changes in vision, CP, palpitations, SOB, cough, N/V/D/C, dysuria, changes in bowel movements, LE edema. ROS otherwise negative.    VITAL SIGNS:  T(F): 99 (04-09-24 @ 05:28)  HR: 80 (04-09-24 @ 05:28)  BP: 135/65 (04-09-24 @ 05:28)  RR: 18 (04-09-24 @ 05:28)  SpO2: 97% (04-09-24 @ 05:28)  Wt(kg): --    PHYSICAL EXAM:    Constitutional: WDWN, NAD  HEENT: PERRL, EOMI, sclera non-icteric, neck supple, trachea midline, no masses, no JVD, MMM, good dentition  Respiratory: CTA b/l, good air entry b/l, no wheezing, no rhonchi, no rales, without accessory muscle use and no intercostal retractions  Cardiovascular: RRR, normal S1S2, no M/R/G  Gastrointestinal: soft, NTND, no masses palpable, BS normal  Extremities: Warm, well perfused, no edema, no clubbing.   Neurological: AAOx***, CN Grossly intact  Skin: Normal temperature, warm, dry    MEDICATIONS  (STANDING):  aspirin  chewable 81 milliGRAM(s) Oral daily  chlorhexidine 2% Cloths 1 Application(s) Topical <User Schedule>  clopidogrel Tablet 75 milliGRAM(s) Oral daily  dextrose 5%. 1000 milliLiter(s) (50 mL/Hr) IV Continuous <Continuous>  dextrose 5%. 1000 milliLiter(s) (100 mL/Hr) IV Continuous <Continuous>  dextrose 50% Injectable 12.5 Gram(s) IV Push once  dextrose 50% Injectable 25 Gram(s) IV Push once  dextrose 50% Injectable 25 Gram(s) IV Push once  enoxaparin Injectable 40 milliGRAM(s) SubCutaneous every 24 hours  glucagon  Injectable 1 milliGRAM(s) IntraMuscular once  influenza   Vaccine 0.5 milliLiter(s) IntraMuscular once  insulin lispro (ADMELOG) corrective regimen sliding scale   SubCutaneous three times a day before meals  insulin lispro (ADMELOG) corrective regimen sliding scale   SubCutaneous at bedtime  insulin lispro (ADMELOG) corrective regimen sliding scale   SubCutaneous every 6 hours  piperacillin/tazobactam IVPB.. 3.375 Gram(s) IV Intermittent every 8 hours    MEDICATIONS  (PRN):  acetaminophen     Tablet .. 650 milliGRAM(s) Oral every 6 hours PRN Temp greater or equal to 38C (100.4F), Mild Pain (1 - 3)  albuterol    90 MICROgram(s) HFA Inhaler 2 Puff(s) Inhalation every 6 hours PRN Bronchospasm  dextrose Oral Gel 15 Gram(s) Oral once PRN Blood Glucose LESS THAN 70 milliGRAM(s)/deciliter      Allergies    No Known Allergies    Intolerances        LABS:                        10.4   10.43 )-----------( 433      ( 08 Apr 2024 03:50 )             31.8     04-08    136  |  99  |  22  ----------------------------<  282<H>  4.4   |  26  |  1.08    Ca    9.2      08 Apr 2024 03:49  Phos  3.5     04-08  Mg     1.8     04-08    TPro  6.5  /  Alb  3.0<L>  /  TBili  0.2  /  DBili  x   /  AST  10  /  ALT  13  /  AlkPhos  75  04-08    PT/INR - ( 08 Apr 2024 03:49 )   PT: 12.9 sec;   INR: 1.18 ratio         PTT - ( 08 Apr 2024 03:49 )  PTT:33.8 sec  Urinalysis Basic - ( 08 Apr 2024 03:49 )    Color: x / Appearance: x / SG: x / pH: x  Gluc: 282 mg/dL / Ketone: x  / Bili: x / Urobili: x   Blood: x / Protein: x / Nitrite: x   Leuk Esterase: x / RBC: x / WBC x   Sq Epi: x / Non Sq Epi: x / Bacteria: x        RADIOLOGY & ADDITIONAL TESTS:  Reviewed **************************************  Graeme Hoffman PGY 1  After 7PM, please contact night float at #[phone] or #[phone]  **************************************    INTERVAL HPI/OVERNIGHT EVENTS:  Patient was seen and examined at bedside. As per nurse and patient, no o/n events, patient resting comfortably. WBC elevated today. No complaints at this time. Patient denies: fever, chills, dizziness, weakness, HA, Changes in vision, CP, palpitations, SOB, cough, N/V/D/C, dysuria, changes in bowel movements, LE edema. ROS otherwise negative.    VITAL SIGNS:  T(F): 99 (04-09-24 @ 05:28)  HR: 80 (04-09-24 @ 05:28)  BP: 135/65 (04-09-24 @ 05:28)  RR: 18 (04-09-24 @ 05:28)  SpO2: 97% (04-09-24 @ 05:28)  Wt(kg): --    PHYSICAL EXAM:    Constitutional: WDWN, NAD  HEENT: PERRL, EOMI, sclera non-icteric, neck supple, trachea midline, no masses, no JVD, MMM, good dentition  Respiratory: CTA b/l, good air entry b/l, no wheezing, no rhonchi, no rales, without accessory muscle use and no intercostal retractions  Cardiovascular: RRR, normal S1S2, no M/R/G  Gastrointestinal: soft, NTND, no masses palpable, BS normal  Extremities: Warm, well perfused, no edema, no clubbing.   Neurological: AAOx***, CN Grossly intact  Skin: Normal temperature, warm, dry    MEDICATIONS  (STANDING):  aspirin  chewable 81 milliGRAM(s) Oral daily  chlorhexidine 2% Cloths 1 Application(s) Topical <User Schedule>  clopidogrel Tablet 75 milliGRAM(s) Oral daily  dextrose 5%. 1000 milliLiter(s) (50 mL/Hr) IV Continuous <Continuous>  dextrose 5%. 1000 milliLiter(s) (100 mL/Hr) IV Continuous <Continuous>  dextrose 50% Injectable 12.5 Gram(s) IV Push once  dextrose 50% Injectable 25 Gram(s) IV Push once  dextrose 50% Injectable 25 Gram(s) IV Push once  enoxaparin Injectable 40 milliGRAM(s) SubCutaneous every 24 hours  glucagon  Injectable 1 milliGRAM(s) IntraMuscular once  influenza   Vaccine 0.5 milliLiter(s) IntraMuscular once  insulin lispro (ADMELOG) corrective regimen sliding scale   SubCutaneous three times a day before meals  insulin lispro (ADMELOG) corrective regimen sliding scale   SubCutaneous at bedtime  insulin lispro (ADMELOG) corrective regimen sliding scale   SubCutaneous every 6 hours  piperacillin/tazobactam IVPB.. 3.375 Gram(s) IV Intermittent every 8 hours    MEDICATIONS  (PRN):  acetaminophen     Tablet .. 650 milliGRAM(s) Oral every 6 hours PRN Temp greater or equal to 38C (100.4F), Mild Pain (1 - 3)  albuterol    90 MICROgram(s) HFA Inhaler 2 Puff(s) Inhalation every 6 hours PRN Bronchospasm  dextrose Oral Gel 15 Gram(s) Oral once PRN Blood Glucose LESS THAN 70 milliGRAM(s)/deciliter      Allergies    No Known Allergies    Intolerances        LABS:                        10.4   10.43 )-----------( 433      ( 08 Apr 2024 03:50 )             31.8     04-08    136  |  99  |  22  ----------------------------<  282<H>  4.4   |  26  |  1.08    Ca    9.2      08 Apr 2024 03:49  Phos  3.5     04-08  Mg     1.8     04-08    TPro  6.5  /  Alb  3.0<L>  /  TBili  0.2  /  DBili  x   /  AST  10  /  ALT  13  /  AlkPhos  75  04-08    PT/INR - ( 08 Apr 2024 03:49 )   PT: 12.9 sec;   INR: 1.18 ratio         PTT - ( 08 Apr 2024 03:49 )  PTT:33.8 sec  Urinalysis Basic - ( 08 Apr 2024 03:49 )    Color: x / Appearance: x / SG: x / pH: x  Gluc: 282 mg/dL / Ketone: x  / Bili: x / Urobili: x   Blood: x / Protein: x / Nitrite: x   Leuk Esterase: x / RBC: x / WBC x   Sq Epi: x / Non Sq Epi: x / Bacteria: x        RADIOLOGY & ADDITIONAL TESTS:  Reviewed **************************************  Graeme Hoffman PGY 1  After 7PM, please contact night float at #[phone] or [phone]  **************************************    INTERVAL HPI/OVERNIGHT EVENTS:  Patient was seen and examined at bedside. As per nurse and patient, no o/n events, patient resting comfortably. WBC elevated today. No complaints at this time. Patient denies: fever, chills, dizziness, weakness, HA, Changes in vision, CP, palpitations, SOB, cough, N/V/D/C, dysuria, changes in bowel movements, LE edema. ROS otherwise negative.    VITAL SIGNS:  T(F): 99 (04-09-24 @ 05:28)  HR: 80 (04-09-24 @ 05:28)  BP: 135/65 (04-09-24 @ 05:28)  RR: 18 (04-09-24 @ 05:28)  SpO2: 97% (04-09-24 @ 05:28)  Wt(kg): --    PHYSICAL EXAM:    Constitutional: WDWN, NAD  HEENT: PERRL, EOMI, sclera non-icteric, neck supple, trachea midline, no masses, no JVD, MMM, good dentition  Respiratory: CTA b/l, good air entry b/l, no wheezing, no rhonchi, no rales, without accessory muscle use and no intercostal retractions  Cardiovascular: RRR, normal S1S2, no M/R/G  Gastrointestinal: soft, NTND, no masses palpable, BS normal  Extremities: Warm, well perfused, no edema, no clubbing.   Neurological: AAOx3, CN Grossly intact  Skin: Normal temperature, warm, dry    MEDICATIONS  (STANDING):  aspirin  chewable 81 milliGRAM(s) Oral daily  chlorhexidine 2% Cloths 1 Application(s) Topical <User Schedule>  clopidogrel Tablet 75 milliGRAM(s) Oral daily  dextrose 5%. 1000 milliLiter(s) (50 mL/Hr) IV Continuous <Continuous>  dextrose 5%. 1000 milliLiter(s) (100 mL/Hr) IV Continuous <Continuous>  dextrose 50% Injectable 12.5 Gram(s) IV Push once  dextrose 50% Injectable 25 Gram(s) IV Push once  dextrose 50% Injectable 25 Gram(s) IV Push once  enoxaparin Injectable 40 milliGRAM(s) SubCutaneous every 24 hours  glucagon  Injectable 1 milliGRAM(s) IntraMuscular once  influenza   Vaccine 0.5 milliLiter(s) IntraMuscular once  insulin lispro (ADMELOG) corrective regimen sliding scale   SubCutaneous three times a day before meals  insulin lispro (ADMELOG) corrective regimen sliding scale   SubCutaneous at bedtime  insulin lispro (ADMELOG) corrective regimen sliding scale   SubCutaneous every 6 hours  piperacillin/tazobactam IVPB.. 3.375 Gram(s) IV Intermittent every 8 hours    MEDICATIONS  (PRN):  acetaminophen     Tablet .. 650 milliGRAM(s) Oral every 6 hours PRN Temp greater or equal to 38C (100.4F), Mild Pain (1 - 3)  albuterol    90 MICROgram(s) HFA Inhaler 2 Puff(s) Inhalation every 6 hours PRN Bronchospasm  dextrose Oral Gel 15 Gram(s) Oral once PRN Blood Glucose LESS THAN 70 milliGRAM(s)/deciliter      Allergies    No Known Allergies    Intolerances        LABS:                        10.4   10.43 )-----------( 433      ( 08 Apr 2024 03:50 )             31.8     04-08    136  |  99  |  22  ----------------------------<  282<H>  4.4   |  26  |  1.08    Ca    9.2      08 Apr 2024 03:49  Phos  3.5     04-08  Mg     1.8     04-08    TPro  6.5  /  Alb  3.0<L>  /  TBili  0.2  /  DBili  x   /  AST  10  /  ALT  13  /  AlkPhos  75  04-08    PT/INR - ( 08 Apr 2024 03:49 )   PT: 12.9 sec;   INR: 1.18 ratio         PTT - ( 08 Apr 2024 03:49 )  PTT:33.8 sec  Urinalysis Basic - ( 08 Apr 2024 03:49 )    Color: x / Appearance: x / SG: x / pH: x  Gluc: 282 mg/dL / Ketone: x  / Bili: x / Urobili: x   Blood: x / Protein: x / Nitrite: x   Leuk Esterase: x / RBC: x / WBC x   Sq Epi: x / Non Sq Epi: x / Bacteria: x        RADIOLOGY & ADDITIONAL TESTS:  Reviewed

## 2024-04-09 NOTE — PROGRESS NOTE ADULT - SUBJECTIVE AND OBJECTIVE BOX
DATE OF SERVICE: 04-09-24 @ 14:52    Patient is a 59y old  Male who presents with a chief complaint of Sent to the ER by PCP following concern for LEFT foot infection (09 Apr 2024 10:41)      INTERVAL HISTORY: Feels well, s/p LLE angio, balloon angioplasty and stent to SFA     REVIEW OF SYSTEMS:  CONSTITUTIONAL: No weakness  EYES/ENT: No visual changes;  No throat pain   NECK: No pain or stiffness  RESPIRATORY: No cough, wheezing; No shortness of breath  CARDIOVASCULAR: No chest pain or palpitations  GASTROINTESTINAL: No abdominal  pain. No nausea, vomiting, or hematemesis  GENITOURINARY: No dysuria, frequency or hematuria  NEUROLOGICAL: No stroke like symptoms  SKIN: No rashes      	  MEDICATIONS:        PHYSICAL EXAM:  T(C): 36.9 (04-09-24 @ 11:30), Max: 37.2 (04-09-24 @ 05:28)  HR: 76 (04-09-24 @ 11:30) (76 - 81)  BP: 128/79 (04-09-24 @ 11:30) (128/79 - 137/69)  RR: 16 (04-09-24 @ 11:30) (16 - 18)  SpO2: 99% (04-09-24 @ 11:30) (97% - 99%)  Wt(kg): --  I&O's Summary    08 Apr 2024 07:01  -  09 Apr 2024 07:00  --------------------------------------------------------  IN: 680 mL / OUT: 0 mL / NET: 680 mL    09 Apr 2024 07:01  -  09 Apr 2024 14:52  --------------------------------------------------------  IN: 240 mL / OUT: 0 mL / NET: 240 mL          Appearance: In no distress	  HEENT:    PERRL, EOMI	  Cardiovascular:  S1 S2, No JVD  Respiratory: Lungs clear to auscultation	  Gastrointestinal:  Soft, Non-tender, + BS	  Vascularature:  No edema of LE  Psychiatric: Appropriate affect   Neuro: no acute focal deficits                               11.0   13.41 )-----------( 459      ( 09 Apr 2024 09:33 )             32.7     04-09    135  |  101  |  18  ----------------------------<  164<H>  4.2   |  25  |  1.07    Ca    8.9      09 Apr 2024 09:33  Phos  3.4     04-09  Mg     1.8     04-09    TPro  6.5  /  Alb  3.0<L>  /  TBili  0.2  /  DBili  x   /  AST  10  /  ALT  13  /  AlkPhos  75  04-08        Labs personally reviewed      ASSESSMENT/PLAN: 	  This is a 59 year old male PMH: Colon ca, Tobacco dependance.  Admitted for left foot pain. + Ulcer      1. Preop Risk Stratification - TTE unremarkable with preserved EF  - EKG non ischemic   - Reports good functional capacity prior to diabetic foot ulcer  - Elevated risk for mod risk LE angio and pods surgery, no contraindication to proceed  - s/p peripheral angio with BMS placement.   - L foot 5th metatarsal head resection with graft application scheduled Wed      2.   Diabetic foot ulcer  - MRI foot  suggestive of early osteomyelitis   - C/w local wound care per Pod  - Abx as per primary team  - POD following-> Plan for left foot 5th met head resection    3.  Dm2:  - HgbA1c 6.8    4 DVT ppx:  - Lovenox sq            JUDSON Enriquez DO [facility]  Cardiovascular Medicine  [address] [place] [address], [address]  Available via call or text on [employer]  Office: [phone]

## 2024-04-09 NOTE — PROGRESS NOTE ADULT - SUBJECTIVE AND OBJECTIVE BOX
[facility] DIVISION OF INFECTIOUS DISEASES  REGIS Khan Y. Patel, S. Shah, G. Casimir  [phone]  ([phone] - weekdays after 5pm and weekends)    Name: EDA CHAWLA  Age/Gender: 59y Male  MRN: [id number]    Interval History:  Patient seen and examined this morning.   No new complaints noted.  Notes reviewed  No concerning overnight events  Afebrile   Allergies: No Known Allergies      Objective:  Vitals:   T(F): 99 (04-09-24 @ 05:28), Max: 99 (04-09-24 @ 05:28)  HR: 80 (04-09-24 @ 05:28) (63 - 81)  BP: 135/65 (04-09-24 @ 05:28) (122/78 - 162/73)  RR: 18 (04-09-24 @ 05:28) (16 - 18)  SpO2: 97% (04-09-24 @ 05:28) (97% - 99%)  Physical Examination:  General: no acute distress  HEENT: NC/AT, anicteric, neck supple  Respiratory: no acc muscle use, breathing comfortably  Cardiovascular: S1 and S2 present  Gastrointestinal: normal appearing, nondistended  Extremities: L foot clean dressing, no edema, no cyanosis  Skin: no visible rash    Laboratory Studies:  CBC:                       11.0   13.41 )-----------( 459      ( 09 Apr 2024 09:33 )             32.7     WBC Trend:  13.41 04-09-24 @ 09:33  10.43 04-08-24 @ 03:50  9.60 04-07-24 @ 06:32  10.57 04-06-24 @ 07:28  9.88 04-05-24 @ 12:02  11.47 04-04-24 @ 07:17  12.14 04-03-24 @ 07:17  14.12 04-02-24 @ 17:43    CMP: 04-09    135  |  101  |  18  ----------------------------<  164<H>  4.2   |  25  |  1.07    Ca    8.9      09 Apr 2024 09:33  Phos  3.4     04-09  Mg     1.8     04-09    TPro  6.5  /  Alb  3.0<L>  /  TBili  0.2  /  DBili  x   /  AST  10  /  ALT  13  /  AlkPhos  75  04-08    Creatinine: 1.07 mg/dL (04-09-24 @ 09:33)  Creatinine: 1.08 mg/dL (04-08-24 @ 03:49)  Creatinine: 1.09 mg/dL (04-07-24 @ 06:32)  Creatinine: 1.11 mg/dL (04-06-24 @ 07:28)  Creatinine: 0.98 mg/dL (04-05-24 @ 11:59)  Creatinine: 0.95 mg/dL (04-04-24 @ 07:16)  Creatinine: 1.12 mg/dL (04-03-24 @ 07:17)  Creatinine: 1.18 mg/dL (04-02-24 @ 17:43)      LIVER FUNCTIONS - ( 08 Apr 2024 03:49 )  Alb: 3.0 g/dL / Pro: 6.5 g/dL / ALK PHOS: 75 U/L / ALT: 13 U/L / AST: 10 U/L / GGT: x           Microbiology: reviewed   Culture - Abscess with Gram Stain (collected 04-02-24 @ 17:40)  Source: .Abscess left foot  Gram Stain (04-03-24 @ 05:56):    No polymorphonuclear leukocytes seen per low power field    Numerous Gram positive cocci in pairs seen per oil power field  Final Report (04-04-24 @ 23:56):    Few Klebsiella oxytoca/Raoultella ornithinolytica    Few Streptococcus mitis/oralis group "Susceptibilities not performed"    Moderate Peptoniphilus harei group "Susceptibilities not performed"    Rare Staphylococcus pettenkoferi "Susceptibilities not performed"  Organism: Klebsiella oxytoca /Raoutella ornithinolytica (04-04-24 @ 23:56)  Organism: Klebsiella oxytoca /Raoutella ornithinolytica (04-04-24 @ 23:56)      Method Type: TARIQ      -  Amoxicillin/Clavulanic Acid: S <=8/4      -  Ampicillin: R >16 These ampicillin results predict results for amoxicillin      -  Ampicillin/Sulbactam: S 8/4      -  Aztreonam: S <=4      -  Cefazolin: R >16      -  Cefepime: S <=2      -  Cefoxitin: S <=8      -  Ceftriaxone: S <=1      -  Ciprofloxacin: S <=0.25      -  Ertapenem: S <=0.5      -  Gentamicin: S <=2      -  Imipenem: S <=1      -  Levofloxacin: S <=0.5      -  Meropenem: S <=1      -  Piperacillin/Tazobactam: S <=8      -  Tobramycin: S <=2      -  Trimethoprim/Sulfamethoxazole: S <=0.5/9.5    Culture - Blood (collected 04-02-24 @ 17:24)  Source: .Blood Blood-Peripheral  Final Report (04-07-24 @ 23:01):    No growth at 5 days    Culture - Blood (collected 04-02-24 @ 17:17)  Source: .Blood Blood-Peripheral  Final Report (04-07-24 @ 23:01):    No growth at 5 days    Radiology: reviewed     Medications:  acetaminophen     Tablet .. 650 milliGRAM(s) Oral every 6 hours PRN  albuterol    90 MICROgram(s) HFA Inhaler 2 Puff(s) Inhalation every 6 hours PRN  aspirin  chewable 81 milliGRAM(s) Oral daily  chlorhexidine 2% Cloths 1 Application(s) Topical <User Schedule>  clopidogrel Tablet 75 milliGRAM(s) Oral daily  dextrose 5%. 1000 milliLiter(s) IV Continuous <Continuous>  dextrose 5%. 1000 milliLiter(s) IV Continuous <Continuous>  dextrose 50% Injectable 25 Gram(s) IV Push once  dextrose 50% Injectable 25 Gram(s) IV Push once  dextrose 50% Injectable 12.5 Gram(s) IV Push once  dextrose Oral Gel 15 Gram(s) Oral once PRN  enoxaparin Injectable 40 milliGRAM(s) SubCutaneous every 24 hours  glucagon  Injectable 1 milliGRAM(s) IntraMuscular once  influenza   Vaccine 0.5 milliLiter(s) IntraMuscular once  insulin lispro (ADMELOG) corrective regimen sliding scale   SubCutaneous every 6 hours  insulin lispro (ADMELOG) corrective regimen sliding scale   SubCutaneous three times a day before meals  insulin lispro (ADMELOG) corrective regimen sliding scale   SubCutaneous at bedtime  piperacillin/tazobactam IVPB.. 3.375 Gram(s) IV Intermittent every 8 hours    Current Antimicrobials:  piperacillin/tazobactam IVPB.. 3.375 Gram(s) IV Intermittent every 8 hours    Prior/Completed Antimicrobials:  cefepime   IVPB  piperacillin/tazobactam IVPB.  piperacillin/tazobactam IVPB.-  vancomycin  IVPB.

## 2024-04-09 NOTE — PROGRESS NOTE ADULT - ASSESSMENT
59M active smoker (48-pack-year history) hx stage IV colon cancer, s/p colectomy with ostomy 1/2024, peripheral neuropathy 2/2 neoadjuvant chemo, sent in from his podiatrist's office with concern for osteomyelitis of the left foot. Patient was previously unaware of any infection, but noticed a wound on his left foot before visiting his oncologist on Tuesday, when he had been due to start adjuvant chemotherapy. His oncologist sent him to the ED for evaluation. Patient denies any hx of foot wounds and has never seen a vascular surgeon. He ambulates without difficulty and denies claudication or rest pain. States he did not have any foot wounds one-three weeks ago. Patient with xray negative for left toe OM, however MRI sf left foot 5th met head OM, 5th proximal phalanx base OM. S/p incision and drainage of wound in ED by podiatry, now planning for left foot 5th metatarsal head resection with graft application vs LWC, pending vascular surgery recommendations. DAMIEN PVR 0.65 on left, left toe 0.30, cf moderate arterial flow limitation in the left lower extremity localizing to the iliofemoral distribution. s/p LLE angiogram 4/8.     Recommendations:  -diet: as tolerated   - IV abx  - pain control PRN  - appreciate podiatry recs  -appreciate care per primary team    Vascular Surgery   [phone]

## 2024-04-10 LAB
ANION GAP SERPL CALC-SCNC: 12 MMOL/L — SIGNIFICANT CHANGE UP (ref 5–17)
APTT BLD: 33.8 SEC — SIGNIFICANT CHANGE UP (ref 24.5–35.6)
BUN SERPL-MCNC: 20 MG/DL — SIGNIFICANT CHANGE UP (ref 7–23)
CALCIUM SERPL-MCNC: 9 MG/DL — SIGNIFICANT CHANGE UP (ref 8.4–10.5)
CHLORIDE SERPL-SCNC: 101 MMOL/L — SIGNIFICANT CHANGE UP (ref 96–108)
CO2 SERPL-SCNC: 22 MMOL/L — SIGNIFICANT CHANGE UP (ref 22–31)
CREAT SERPL-MCNC: 1.09 MG/DL — SIGNIFICANT CHANGE UP (ref 0.5–1.3)
EGFR: 79 ML/MIN/1.73M2 — SIGNIFICANT CHANGE UP
GLUCOSE BLDC GLUCOMTR-MCNC: 180 MG/DL — HIGH (ref 70–99)
GLUCOSE BLDC GLUCOMTR-MCNC: 199 MG/DL — HIGH (ref 70–99)
GLUCOSE BLDC GLUCOMTR-MCNC: 244 MG/DL — HIGH (ref 70–99)
GLUCOSE SERPL-MCNC: 156 MG/DL — HIGH (ref 70–99)
GRAM STN FLD: SIGNIFICANT CHANGE UP
GRAM STN FLD: SIGNIFICANT CHANGE UP
HCT VFR BLD CALC: 32.3 % — LOW (ref 39–50)
HGB BLD-MCNC: 10.9 G/DL — LOW (ref 13–17)
INR BLD: 1.13 RATIO — SIGNIFICANT CHANGE UP (ref 0.85–1.18)
MCHC RBC-ENTMCNC: 27.9 PG — SIGNIFICANT CHANGE UP (ref 27–34)
MCHC RBC-ENTMCNC: 33.7 GM/DL — SIGNIFICANT CHANGE UP (ref 32–36)
MCV RBC AUTO: 82.8 FL — SIGNIFICANT CHANGE UP (ref 80–100)
NRBC # BLD: 0 /100 WBCS — SIGNIFICANT CHANGE UP (ref 0–0)
PLATELET # BLD AUTO: 469 K/UL — HIGH (ref 150–400)
POTASSIUM SERPL-MCNC: 4.4 MMOL/L — SIGNIFICANT CHANGE UP (ref 3.5–5.3)
POTASSIUM SERPL-SCNC: 4.4 MMOL/L — SIGNIFICANT CHANGE UP (ref 3.5–5.3)
PROTHROM AB SERPL-ACNC: 12.4 SEC — SIGNIFICANT CHANGE UP (ref 9.5–13)
RBC # BLD: 3.9 M/UL — LOW (ref 4.2–5.8)
RBC # FLD: 11.8 % — SIGNIFICANT CHANGE UP (ref 10.3–14.5)
SODIUM SERPL-SCNC: 135 MMOL/L — SIGNIFICANT CHANGE UP (ref 135–145)
SPECIMEN SOURCE: SIGNIFICANT CHANGE UP
SPECIMEN SOURCE: SIGNIFICANT CHANGE UP
WBC # BLD: 13.33 K/UL — HIGH (ref 3.8–10.5)
WBC # FLD AUTO: 13.33 K/UL — HIGH (ref 3.8–10.5)

## 2024-04-10 PROCEDURE — 88304 TISSUE EXAM BY PATHOLOGIST: CPT | Mod: 26

## 2024-04-10 PROCEDURE — 88305 TISSUE EXAM BY PATHOLOGIST: CPT | Mod: 26

## 2024-04-10 PROCEDURE — 73630 X-RAY EXAM OF FOOT: CPT | Mod: 26,LT

## 2024-04-10 PROCEDURE — 88311 DECALCIFY TISSUE: CPT | Mod: 26

## 2024-04-10 RX ORDER — INSULIN LISPRO 100/ML
VIAL (ML) SUBCUTANEOUS
Refills: 0 | Status: DISCONTINUED | OUTPATIENT
Start: 2024-04-10 | End: 2024-04-10

## 2024-04-10 RX ORDER — INSULIN LISPRO 100/ML
VIAL (ML) SUBCUTANEOUS EVERY 6 HOURS
Refills: 0 | Status: DISCONTINUED | OUTPATIENT
Start: 2024-04-10 | End: 2024-04-10

## 2024-04-10 RX ORDER — CLOPIDOGREL BISULFATE 75 MG/1
75 TABLET, FILM COATED ORAL DAILY
Refills: 0 | Status: DISCONTINUED | OUTPATIENT
Start: 2024-04-10 | End: 2024-04-12

## 2024-04-10 RX ORDER — ACETAMINOPHEN 500 MG
650 TABLET ORAL EVERY 6 HOURS
Refills: 0 | Status: DISCONTINUED | OUTPATIENT
Start: 2024-04-10 | End: 2024-04-12

## 2024-04-10 RX ORDER — OXYCODONE AND ACETAMINOPHEN 5; 325 MG/1; MG/1
1 TABLET ORAL EVERY 4 HOURS
Refills: 0 | Status: DISCONTINUED | OUTPATIENT
Start: 2024-04-10 | End: 2024-04-12

## 2024-04-10 RX ORDER — CHLORHEXIDINE GLUCONATE 213 G/1000ML
1 SOLUTION TOPICAL
Refills: 0 | Status: DISCONTINUED | OUTPATIENT
Start: 2024-04-10 | End: 2024-04-12

## 2024-04-10 RX ORDER — PIPERACILLIN AND TAZOBACTAM 4; .5 G/20ML; G/20ML
3.38 INJECTION, POWDER, LYOPHILIZED, FOR SOLUTION INTRAVENOUS EVERY 8 HOURS
Refills: 0 | Status: DISCONTINUED | OUTPATIENT
Start: 2024-04-10 | End: 2024-04-12

## 2024-04-10 RX ORDER — ASPIRIN/CALCIUM CARB/MAGNESIUM 324 MG
81 TABLET ORAL DAILY
Refills: 0 | Status: DISCONTINUED | OUTPATIENT
Start: 2024-04-10 | End: 2024-04-12

## 2024-04-10 RX ORDER — ONDANSETRON 8 MG/1
4 TABLET, FILM COATED ORAL ONCE
Refills: 0 | Status: DISCONTINUED | OUTPATIENT
Start: 2024-04-10 | End: 2024-04-10

## 2024-04-10 RX ORDER — HYDROMORPHONE HYDROCHLORIDE 2 MG/ML
0.5 INJECTION INTRAMUSCULAR; INTRAVENOUS; SUBCUTANEOUS
Refills: 0 | Status: DISCONTINUED | OUTPATIENT
Start: 2024-04-10 | End: 2024-04-10

## 2024-04-10 RX ORDER — SODIUM CHLORIDE 9 MG/ML
1000 INJECTION, SOLUTION INTRAVENOUS
Refills: 0 | Status: DISCONTINUED | OUTPATIENT
Start: 2024-04-10 | End: 2024-04-10

## 2024-04-10 RX ORDER — HYDROMORPHONE HYDROCHLORIDE 2 MG/ML
0.5 INJECTION INTRAMUSCULAR; INTRAVENOUS; SUBCUTANEOUS EVERY 4 HOURS
Refills: 0 | Status: DISCONTINUED | OUTPATIENT
Start: 2024-04-10 | End: 2024-04-12

## 2024-04-10 RX ORDER — INSULIN LISPRO 100/ML
VIAL (ML) SUBCUTANEOUS
Refills: 0 | Status: DISCONTINUED | OUTPATIENT
Start: 2024-04-10 | End: 2024-04-12

## 2024-04-10 RX ADMIN — PIPERACILLIN AND TAZOBACTAM 25 GRAM(S): 4; .5 INJECTION, POWDER, LYOPHILIZED, FOR SOLUTION INTRAVENOUS at 21:34

## 2024-04-10 RX ADMIN — Medication 81 MILLIGRAM(S): at 11:38

## 2024-04-10 RX ADMIN — CLOPIDOGREL BISULFATE 75 MILLIGRAM(S): 75 TABLET, FILM COATED ORAL at 11:39

## 2024-04-10 RX ADMIN — PIPERACILLIN AND TAZOBACTAM 25 GRAM(S): 4; .5 INJECTION, POWDER, LYOPHILIZED, FOR SOLUTION INTRAVENOUS at 05:10

## 2024-04-10 NOTE — PROGRESS NOTE ADULT - SUBJECTIVE AND OBJECTIVE BOX
SUBJECTIVE / OVERNIGHT EVENTS:    patient seen and examined  resting comfortably in bed  plan for OR today    --------------------------------------------------------------------------------------------  LABS:                        10.9   13.33 )-----------( 469      ( 10 Apr 2024 06:34 )             32.3     04-10    135  |  101  |  20  ----------------------------<  156<H>  4.4   |  22  |  1.09    Ca    9.0      10 Apr 2024 06:34  Phos  3.4     04-09  Mg     1.8     04-09      PT/INR - ( 10 Apr 2024 06:34 )   PT: 12.4 sec;   INR: 1.13 ratio         PTT - ( 10 Apr 2024 06:34 )  PTT:33.8 sec  CAPILLARY BLOOD GLUCOSE      POCT Blood Glucose.: 180 mg/dL (10 Apr 2024 08:55)  POCT Blood Glucose.: 199 mg/dL (10 Apr 2024 07:25)        Urinalysis Basic - ( 10 Apr 2024 06:34 )    Color: x / Appearance: x / SG: x / pH: x  Gluc: 156 mg/dL / Ketone: x  / Bili: x / Urobili: x   Blood: x / Protein: x / Nitrite: x   Leuk Esterase: x / RBC: x / WBC x   Sq Epi: x / Non Sq Epi: x / Bacteria: x        RADIOLOGY & ADDITIONAL TESTS:    Imaging Personally Reviewed:  [x] YES  [ ] NO    Consultant(s) Notes Reviewed:  [x] YES  [ ] NO    MEDICATIONS  (STANDING):  aspirin  chewable 81 milliGRAM(s) Oral daily  chlorhexidine 2% Cloths 1 Application(s) Topical <User Schedule>  clopidogrel Tablet 75 milliGRAM(s) Oral daily  influenza   Vaccine 0.5 milliLiter(s) IntraMuscular once  insulin lispro (ADMELOG) corrective regimen sliding scale   SubCutaneous three times a day before meals  insulin lispro (ADMELOG) corrective regimen sliding scale   SubCutaneous every 6 hours  piperacillin/tazobactam IVPB.. 3.375 Gram(s) IV Intermittent every 8 hours    MEDICATIONS  (PRN):  acetaminophen     Tablet .. 650 milliGRAM(s) Oral every 6 hours PRN Mild Pain (1 - 3)  HYDROmorphone  Injectable 0.5 milliGRAM(s) IV Push every 4 hours PRN Severe Pain (7 - 10)  oxycodone    5 mG/acetaminophen 325 mG 1 Tablet(s) Oral every 4 hours PRN Moderate Pain (4 - 6)      Care Discussed with Consultants/Other Providers [x] YES  [ ] NO    Vital Signs Last 24 Hrs  T(C): 36.4 (10 Apr 2024 11:36), Max: 37 (09 Apr 2024 20:23)  T(F): 97.5 (10 Apr 2024 11:36), Max: 98.6 (09 Apr 2024 20:23)  HR: 56 (10 Apr 2024 11:36) (56 - 76)  BP: 103/62 (10 Apr 2024 11:36) (91/55 - 128/77)  BP(mean): 88 (10 Apr 2024 10:30) (69 - 91)  RR: 18 (10 Apr 2024 11:36) (14 - 18)  SpO2: 98% (10 Apr 2024 11:36) (95% - 100%)    Parameters below as of 10 Apr 2024 11:36  Patient On (Oxygen Delivery Method): room air      I&O's Summary    09 Apr 2024 07:01  -  10 Apr 2024 07:00  --------------------------------------------------------  IN: 390 mL / OUT: 0 mL / NET: 390 mL    10 Apr 2024 07:01  -  10 Apr 2024 13:50  --------------------------------------------------------  IN: 0 mL / OUT: 750 mL / NET: -750 mL    PHYSICAL EXAM:  GENERAL: NAD, well-developed, comfortable  HEAD:  Atraumatic, Normocephalic  EYES: EOMI, PERRLA, conjunctiva and sclera clear  NECK: Supple, No JVD  CHEST/LUNG: Clear to auscultation bilaterally; No wheeze  HEART: Regular rate and rhythm; No murmurs, rubs, or gallops  ABDOMEN: Soft, Nontender, Nondistended; Bowel sounds present  NEURO: AAOx3, no focal weakness, 5/5 b/l extremity strength, b/l knee no arthritis, no effusion   EXTREMITIES:  2+ Peripheral Pulses, No clubbing, cyanosis, or edema  SKIN: No rashes or lesions

## 2024-04-10 NOTE — PROGRESS NOTE ADULT - SUBJECTIVE AND OBJECTIVE BOX
I have reviewed this patients chart and H and P, and I find him an acceptable candidate to continue to the OR.    Thanks,  Hugo Elizondo MD

## 2024-04-10 NOTE — PROGRESS NOTE ADULT - ASSESSMENT
59M active smoker (48-pack-year history) hx stage IV colon cancer, s/p colectomy with ostomy 1/2024, peripheral neuropathy 2/2 neoadjuvant chemo, sent in from his podiatrist's office with concern for osteomyelitis of the left foot. Patient was previously unaware of any infection, but noticed a wound on his left foot before visiting his oncologist on Tuesday, when he had been due to start adjuvant chemotherapy. His oncologist sent him to the ED for evaluation. Patient denies any hx of foot wounds and has never seen a vascular surgeon. He ambulates without difficulty and denies claudication or rest pain. States he did not have any foot wounds one-three weeks ago. Patient with xray negative for left toe OM, however MRI sf left foot 5th met head OM, 5th proximal phalanx base OM. S/p incision and drainage of wound in ED by podiatry, now planning for left foot 5th metatarsal head resection with graft application vs LWC, pending vascular surgery recommendations. DAMIEN PVR 0.65 on left, left toe 0.30, cf moderate arterial flow limitation in the left lower extremity localizing to the iliofemoral distribution. s/p LLE angiogram, SFA stent placement on 4/8.      Recommendations:  -Podiatry intervention today 4/10    Discussed with vascular fellow on behalf of Dr. Rodriguez     Vascular Surgery   [phone]

## 2024-04-10 NOTE — CONSULT NOTE ADULT - CONSULT REQUESTED DATE/TIME
02-Apr-2024 17:26
04-Apr-2024 17:18
03-Apr-2024 09:28
03-Apr-2024 15:02
03-Apr-2024
10-Apr-2024 17:39

## 2024-04-10 NOTE — PROGRESS NOTE ADULT - ASSESSMENT
Patient is a 59 year old male with PMH of stage IV colon cancer, s/p colectomy with ostomy 1/2024, peripheral neuropathy sent in from his podiatrist office with concern for osteomyelitis of the left foot.     L foot submet 5th wound with suspected early OM  Leukocytosis likely due to above   - s/p L foot wound explored by Podiatry - 1cm incision proximal to wound down to level of subq, mild serous drainage  - L foot Wcx - polymicrobial - Kleb oxytoca/Roultella ornithinolytica; Strep mitis/oralis grp, Peptoniphilus harei grp, Staph pettenkoferi  - MRI L foot suggestive of early OM at 5th metatarsal head and base of proximal phalanx, deep to soft tissue wound   - MRSA PCR screen negative, s/p vancomycin   - Bcx NGTD x2   - 4/8 s/p LLE angiogram, balloon angioplasty and stent to SFA  - 4/10 s/p L foot 5th met head resection resection, open    -- brief op note reviewed - bone at proximal resection and biopsy was soft and bad quality, noted with necrotic wound 3.25x2.5cm with evidence of soft tissue infection and 3cc pus expressed  - afebrile, WBC noted, elevated ESR/CRP (99/140)    Recommendations:   Follow OR culture and surg path   Podiatry planning to take back to OR for staged procedure  Continue zosyn 3.375g IV Q8h   Continue local wound care    Monitor temps/WBC      Cherelle Horvath M.D.  [facility], Division of Infectious Diseases  [phone]  After 5pm on weekdays and all day on weekends - please call [phone]

## 2024-04-10 NOTE — PROGRESS NOTE ADULT - SUBJECTIVE AND OBJECTIVE BOX
[facility] DIVISION OF INFECTIOUS DISEASES  REGIS Khan Y. Patel, S. Shah, G. Melvin  [phone]  ([phone] - weekdays after 5pm and weekends)    Name: EDA CHAWLA  Age/Gender: 59y Male  MRN: [id number]    Interval History:  Patient went to OR this morning.   Notes reviewed  No concerning overnight events  Afebrile   Allergies: No Known Allergies      Objective:  Vitals:   T(F): 97.5 (04-10-24 @ 11:36), Max: 98.6 (04-09-24 @ 20:23)  HR: 56 (04-10-24 @ 11:36) (56 - 76)  BP: 103/62 (04-10-24 @ 11:36) (91/55 - 128/77)  RR: 18 (04-10-24 @ 11:36) (14 - 18)  SpO2: 98% (04-10-24 @ 11:36) (95% - 100%)    Laboratory Studies:  CBC:                       10.9   13.33 )-----------( 469      ( 10 Apr 2024 06:34 )             32.3     WBC Trend:  13.33 04-10-24 @ 06:34  13.41 04-09-24 @ 09:33  10.43 04-08-24 @ 03:50  9.60 04-07-24 @ 06:32  10.57 04-06-24 @ 07:28  9.88 04-05-24 @ 12:02  11.47 04-04-24 @ 07:17    CMP: 04-10    135  |  101  |  20  ----------------------------<  156<H>  4.4   |  22  |  1.09    Ca    9.0      10 Apr 2024 06:34  Phos  3.4     04-09  Mg     1.8     04-09      Creatinine: 1.09 mg/dL (04-10-24 @ 06:34)  Creatinine: 1.07 mg/dL (04-09-24 @ 09:33)  Creatinine: 1.08 mg/dL (04-08-24 @ 03:49)  Creatinine: 1.09 mg/dL (04-07-24 @ 06:32)  Creatinine: 1.11 mg/dL (04-06-24 @ 07:28)  Creatinine: 0.98 mg/dL (04-05-24 @ 11:59)  Creatinine: 0.95 mg/dL (04-04-24 @ 07:16)    Microbiology: reviewed   Culture - Abscess with Gram Stain (collected 04-02-24 @ 17:40)  Source: .Abscess left foot  Gram Stain (04-03-24 @ 05:56):    No polymorphonuclear leukocytes seen per low power field    Numerous Gram positive cocci in pairs seen per oil power field  Final Report (04-04-24 @ 23:56):    Few Klebsiella oxytoca/Raoultella ornithinolytica    Few Streptococcus mitis/oralis group "Susceptibilities not performed"    Moderate Peptoniphilus harei group "Susceptibilities not performed"    Rare Staphylococcus pettenkoferi "Susceptibilities not performed"  Organism: Klebsiella oxytoca /Raoutella ornithinolytica (04-04-24 @ 23:56)  Organism: Klebsiella oxytoca /Raoutella ornithinolytica (04-04-24 @ 23:56)      Method Type: TARIQ      -  Amoxicillin/Clavulanic Acid: S <=8/4      -  Ampicillin: R >16 These ampicillin results predict results for amoxicillin      -  Ampicillin/Sulbactam: S 8/4      -  Aztreonam: S <=4      -  Cefazolin: R >16      -  Cefepime: S <=2      -  Cefoxitin: S <=8      -  Ceftriaxone: S <=1      -  Ciprofloxacin: S <=0.25      -  Ertapenem: S <=0.5      -  Gentamicin: S <=2      -  Imipenem: S <=1      -  Levofloxacin: S <=0.5      -  Meropenem: S <=1      -  Piperacillin/Tazobactam: S <=8      -  Tobramycin: S <=2      -  Trimethoprim/Sulfamethoxazole: S <=0.5/9.5    Culture - Blood (collected 04-02-24 @ 17:24)  Source: .Blood Blood-Peripheral  Final Report (04-07-24 @ 23:01):    No growth at 5 days    Culture - Blood (collected 04-02-24 @ 17:17)  Source: .Blood Blood-Peripheral  Final Report (04-07-24 @ 23:01):    No growth at 5 days    Radiology: reviewed     Medications:  acetaminophen     Tablet .. 650 milliGRAM(s) Oral every 6 hours PRN  aspirin  chewable 81 milliGRAM(s) Oral daily  chlorhexidine 2% Cloths 1 Application(s) Topical <User Schedule>  clopidogrel Tablet 75 milliGRAM(s) Oral daily  HYDROmorphone  Injectable 0.5 milliGRAM(s) IV Push every 4 hours PRN  influenza   Vaccine 0.5 milliLiter(s) IntraMuscular once  insulin lispro (ADMELOG) corrective regimen sliding scale   SubCutaneous three times a day before meals  insulin lispro (ADMELOG) corrective regimen sliding scale   SubCutaneous every 6 hours  oxycodone    5 mG/acetaminophen 325 mG 1 Tablet(s) Oral every 4 hours PRN  piperacillin/tazobactam IVPB.. 3.375 Gram(s) IV Intermittent every 8 hours    Current Antimicrobials:  piperacillin/tazobactam IVPB.. 3.375 Gram(s) IV Intermittent every 8 hours    Prior/Completed Antimicrobials:  cefepime   IVPB  piperacillin/tazobactam IVPB.  piperacillin/tazobactam IVPB.-  vancomycin  IVPB.

## 2024-04-10 NOTE — PROGRESS NOTE ADULT - PROBLEM SELECTOR PLAN 4
A1c 6.8%. Not on any medications at home.  - MEHREENS A1c 6.8%. Not on any medications at home.  - LDSS  - endo consult

## 2024-04-10 NOTE — PROGRESS NOTE ADULT - SUBJECTIVE AND OBJECTIVE BOX
[facility] HEMATOLOGY/ONCOLOGY INPATIENT PROGRESS NOTE     Interval Hx:   04-10-24: Mr. Menon was seen at bedside today.    Meds:   MEDICATIONS  (STANDING):  aspirin  chewable 81 milliGRAM(s) Oral daily  chlorhexidine 2% Cloths 1 Application(s) Topical <User Schedule>  clopidogrel Tablet 75 milliGRAM(s) Oral daily  dextrose 5%. 1000 milliLiter(s) (100 mL/Hr) IV Continuous <Continuous>  dextrose 5%. 1000 milliLiter(s) (50 mL/Hr) IV Continuous <Continuous>  dextrose 50% Injectable 25 Gram(s) IV Push once  dextrose 50% Injectable 12.5 Gram(s) IV Push once  dextrose 50% Injectable 25 Gram(s) IV Push once  enoxaparin Injectable 40 milliGRAM(s) SubCutaneous every 24 hours  glucagon  Injectable 1 milliGRAM(s) IntraMuscular once  influenza   Vaccine 0.5 milliLiter(s) IntraMuscular once  insulin lispro (ADMELOG) corrective regimen sliding scale   SubCutaneous every 6 hours  insulin lispro (ADMELOG) corrective regimen sliding scale   SubCutaneous three times a day before meals  insulin lispro (ADMELOG) corrective regimen sliding scale   SubCutaneous at bedtime  piperacillin/tazobactam IVPB.. 3.375 Gram(s) IV Intermittent every 8 hours    MEDICATIONS  (PRN):  acetaminophen     Tablet .. 650 milliGRAM(s) Oral every 6 hours PRN Temp greater or equal to 38C (100.4F), Mild Pain (1 - 3)  albuterol    90 MICROgram(s) HFA Inhaler 2 Puff(s) Inhalation every 6 hours PRN Bronchospasm  dextrose Oral Gel 15 Gram(s) Oral once PRN Blood Glucose LESS THAN 70 milliGRAM(s)/deciliter    Vital Signs Last 24 Hrs  T(C): 36.7 (10 Apr 2024 05:50), Max: 37 (09 Apr 2024 20:23)  T(F): 98 (10 Apr 2024 05:50), Max: 98.6 (09 Apr 2024 20:23)  HR: 70 (10 Apr 2024 05:50) (70 - 76)  BP: 128/77 (10 Apr 2024 05:50) (121/76 - 128/79)  BP(mean): --  RR: 18 (10 Apr 2024 05:50) (16 - 18)  SpO2: 95% (10 Apr 2024 05:50) (95% - 99%)    Parameters below as of 10 Apr 2024 05:50  Patient On (Oxygen Delivery Method): room air    Physical Exam:  Gen: NAD  HEENT: EOMI, MMM  Chest: equal chest rise, speaking full sentences   Cardiac: RR  Abd: Nondistended  Ext: LLE/foot cellulitis  Neuro: AAOx3, normal mood and affect     Labs:                        11.0   13.41 )-----------( 459      ( 09 Apr 2024 09:33 )             32.7     CBC Full  -  ( 09 Apr 2024 09:33 )  WBC Count : 13.41 K/uL  RBC Count : 3.92 M/uL  Hemoglobin : 11.0 g/dL  Hematocrit : 32.7 %  Platelet Count - Automated : 459 K/uL  Mean Cell Volume : 83.4 fl  Mean Cell Hemoglobin : 28.1 pg  Mean Cell Hemoglobin Concentration : 33.6 gm/dL    04-09    135  |  101  |  18  ----------------------------<  164<H>  4.2   |  25  |  1.07    Ca    8.9      09 Apr 2024 09:33  Phos  3.4     04-09  Mg     1.8     04-09         [facility] HEMATOLOGY/ONCOLOGY INPATIENT PROGRESS NOTE     Interval Hx:   04-10-24: Mr. Menon was seen at bedside today, awake and alert, stated he did well overnight, pending surgery with Podiatry this morning     Meds:   MEDICATIONS  (STANDING):  aspirin  chewable 81 milliGRAM(s) Oral daily  chlorhexidine 2% Cloths 1 Application(s) Topical <User Schedule>  clopidogrel Tablet 75 milliGRAM(s) Oral daily  dextrose 5%. 1000 milliLiter(s) (100 mL/Hr) IV Continuous <Continuous>  dextrose 5%. 1000 milliLiter(s) (50 mL/Hr) IV Continuous <Continuous>  dextrose 50% Injectable 25 Gram(s) IV Push once  dextrose 50% Injectable 12.5 Gram(s) IV Push once  dextrose 50% Injectable 25 Gram(s) IV Push once  enoxaparin Injectable 40 milliGRAM(s) SubCutaneous every 24 hours  glucagon  Injectable 1 milliGRAM(s) IntraMuscular once  influenza   Vaccine 0.5 milliLiter(s) IntraMuscular once  insulin lispro (ADMELOG) corrective regimen sliding scale   SubCutaneous every 6 hours  insulin lispro (ADMELOG) corrective regimen sliding scale   SubCutaneous three times a day before meals  insulin lispro (ADMELOG) corrective regimen sliding scale   SubCutaneous at bedtime  piperacillin/tazobactam IVPB.. 3.375 Gram(s) IV Intermittent every 8 hours    MEDICATIONS  (PRN):  acetaminophen     Tablet .. 650 milliGRAM(s) Oral every 6 hours PRN Temp greater or equal to 38C (100.4F), Mild Pain (1 - 3)  albuterol    90 MICROgram(s) HFA Inhaler 2 Puff(s) Inhalation every 6 hours PRN Bronchospasm  dextrose Oral Gel 15 Gram(s) Oral once PRN Blood Glucose LESS THAN 70 milliGRAM(s)/deciliter    Vital Signs Last 24 Hrs  T(C): 36.7 (10 Apr 2024 05:50), Max: 37 (09 Apr 2024 20:23)  T(F): 98 (10 Apr 2024 05:50), Max: 98.6 (09 Apr 2024 20:23)  HR: 70 (10 Apr 2024 05:50) (70 - 76)  BP: 128/77 (10 Apr 2024 05:50) (121/76 - 128/79)  BP(mean): --  RR: 18 (10 Apr 2024 05:50) (16 - 18)  SpO2: 95% (10 Apr 2024 05:50) (95% - 99%)    Parameters below as of 10 Apr 2024 05:50  Patient On (Oxygen Delivery Method): room air    Physical Exam:  Gen: NAD  HEENT: EOMI, MMM  Chest: equal chest rise, speaking full sentences   Cardiac: RR  Abd: Nondistended  Ext: LLE/foot cellulitis  Neuro: AAOx3, normal mood and affect     Labs:                        11.0   13.41 )-----------( 459      ( 09 Apr 2024 09:33 )             32.7     CBC Full  -  ( 09 Apr 2024 09:33 )  WBC Count : 13.41 K/uL  RBC Count : 3.92 M/uL  Hemoglobin : 11.0 g/dL  Hematocrit : 32.7 %  Platelet Count - Automated : 459 K/uL  Mean Cell Volume : 83.4 fl  Mean Cell Hemoglobin : 28.1 pg  Mean Cell Hemoglobin Concentration : 33.6 gm/dL    04-09    135  |  101  |  18  ----------------------------<  164<H>  4.2   |  25  |  1.07    Ca    8.9      09 Apr 2024 09:33  Phos  3.4     04-09  Mg     1.8     04-09         Opt out

## 2024-04-10 NOTE — CONSULT NOTE ADULT - SUBJECTIVE AND OBJECTIVE BOX
[facility] Initial Consult Note    HPI  59y old Male with history of T2DM not on meds, Colon cancer s/p colectomy and colostomy and peripheral neuropathy here with acute osteomyelitis of left metatarsal s/p resection on 4/10.     History was obtained from patient and chart review.    Diabetes history: He's had T2DM for a while and has been able to manage it with diet. Metformin recommended previously at diagnosis however he was able to get it controlled with changes to his diet. He monitors his FS at home and they range from 100-130. He started to have hyperglycemia on 4/5 and was placed on a correctional scale however his FS remained high. He then felt the insulin was making things worse and not working so he refused the insulin and also started to refuse FS monitoring despite counseling by primary team on importance of glycemic control.     He has osteomyelitis involving his left metatarsal and is now post amputation today.     ROS  Denies chest pain, shortness of breath, abdominal pain, nausea, vomiting, diarrhea, dizziness, lightheadedness. Reports good appetite.    Vital Signs Last 24 Hrs  T(C): 36.4 (04-10-24 @ 11:36), Max: 37 (04-09-24 @ 20:23)  HR: 56 (04-10-24 @ 11:36) (56 - 76)  BP: 103/62 (04-10-24 @ 11:36) (91/55 - 128/77)  RR: 18 (04-10-24 @ 11:36) (14 - 18)  SpO2: 98% (04-10-24 @ 11:36) (95% - 100%)    Physical Exam  Gen: NAD, alert, awake  HEENT: NC/AT, moist mucous membranes  Chest: normal respiratory effort  Heart: +S1 S2  Neuro: normal mood and affect  Ext: left foot wrapped however with bleeding    Medications  acetaminophen     Tablet .. 650 milliGRAM(s) Oral every 6 hours PRN  aspirin  chewable 81 milliGRAM(s) Oral daily  chlorhexidine 2% Cloths 1 Application(s) Topical <User Schedule>  clopidogrel Tablet 75 milliGRAM(s) Oral daily  HYDROmorphone  Injectable 0.5 milliGRAM(s) IV Push every 4 hours PRN  influenza   Vaccine 0.5 milliLiter(s) IntraMuscular once  insulin lispro (ADMELOG) corrective regimen sliding scale   SubCutaneous Before meals and at bedtime  oxycodone    5 mG/acetaminophen 325 mG 1 Tablet(s) Oral every 4 hours PRN  piperacillin/tazobactam IVPB.. 3.375 Gram(s) IV Intermittent every 8 hours    Pertinent labs/imaging  POCT Blood Glucose.: 180 mg/dL (04-10-24 @ 08:55)  POCT Blood Glucose.: 199 mg/dL (04-10-24 @ 07:25)    eGFR: 79 mL/min/1.73m2 (04-10-24 @ 06:34)  eGFR: 80 mL/min/1.73m2 (04-09-24 @ 09:33)

## 2024-04-10 NOTE — PROGRESS NOTE ADULT - PROBLEM SELECTOR PLAN 1
In setting of diabetic foot wound. MRI demonstrating early osteomyelitis of L foot 5th metatarsal head and base of proximal phalanx.  - DAMIEN demonstrating LLE PAD  - BCx: NGTD  - Wound Cx: Klebsiella   - Podiatry following: OR for L foot 5th metatarsal head resection with graft application 4/10 pending vascular  - Vascular surgery following: LLE angiogram with possible stent 4/8  - ID following: switch to Zosyn  - WBC elevated today will CTM and appreciate id recs; afebrile

## 2024-04-10 NOTE — CONSULT NOTE ADULT - ASSESSMENT
59y old Male with history of T2DM not on meds, Colon cancer s/p colectomy and colostomy and peripheral neuropathy here with acute osteomyelitis of left metatarsal s/p resection on 4/10.     1. T2DM with hyperglycemia  - I had an extensive discussion with the patient regarding optimal inpatient glycemic control, especially in the setting of acute infection and surgery to promote proper wound healing. He believes that insulin is not working for him despite me explaining that requirements are typically higher in the hospital setting. He has good outpatient control as evidenced by his a1c but the current hyperglycemia is due to stress of acute infection and surgery.   - I also offered trying Tradjenta however he adamantly refused.  - Despite being aware that poor glycemic control can delay wound healing, he is refusing insulin at this time  - He seems to agree to periodic FS monitoring  - Will try again tomorrow    2. Left 5th metatarsal osteomyelitis s/p amputation  - bleeding noted, podiatry following  - on Zosyn, ID following    Will continue to follow. Discussed with primary daniel.    Catracho Mooney MD  [facility]    [address] [place] [address]    T [phone]  F [phone]

## 2024-04-10 NOTE — PROGRESS NOTE ADULT - SUBJECTIVE AND OBJECTIVE BOX
**************************************  Graeme Hoffman PGY 1  After 7PM, please contact night float at [phone] or [phone]  **************************************    INTERVAL HPI/OVERNIGHT EVENTS:  Patient was seen and examined at bedside. As per nurse and patient, no o/n events, patient resting comfortably. No complaints at this time. Patient denies: fever, chills, dizziness, weakness, HA, Changes in vision, CP, palpitations, SOB, cough, N/V/D/C, dysuria, changes in bowel movements, LE edema. ROS otherwise negative.    VITAL SIGNS:  T(F): 98 (04-10-24 @ 05:50)  HR: 70 (04-10-24 @ 07:27)  BP: 128/77 (04-10-24 @ 07:27)  RR: 18 (04-10-24 @ 07:27)  SpO2: 95% (04-10-24 @ 07:27)  Wt(kg): --    PHYSICAL EXAM:    Constitutional: WDWN, NAD  HEENT: PERRL, EOMI, sclera non-icteric, neck supple, trachea midline, no masses, no JVD, MMM, good dentition  Respiratory: CTA b/l, good air entry b/l, no wheezing, no rhonchi, no rales, without accessory muscle use and no intercostal retractions  Cardiovascular: RRR, normal S1S2, no M/R/G  Gastrointestinal: soft, NTND, no masses palpable, BS normal  Extremities: Warm, well perfused, no edema, no clubbing.   Neurological: AAOx***, CN Grossly intact  Skin: Normal temperature, warm, dry    MEDICATIONS  (STANDING):  aspirin  chewable 81 milliGRAM(s) Oral daily  chlorhexidine 2% Cloths 1 Application(s) Topical <User Schedule>  clopidogrel Tablet 75 milliGRAM(s) Oral daily  dextrose 5%. 1000 milliLiter(s) (50 mL/Hr) IV Continuous <Continuous>  dextrose 5%. 1000 milliLiter(s) (100 mL/Hr) IV Continuous <Continuous>  dextrose 50% Injectable 25 Gram(s) IV Push once  dextrose 50% Injectable 25 Gram(s) IV Push once  dextrose 50% Injectable 12.5 Gram(s) IV Push once  enoxaparin Injectable 40 milliGRAM(s) SubCutaneous every 24 hours  glucagon  Injectable 1 milliGRAM(s) IntraMuscular once  influenza   Vaccine 0.5 milliLiter(s) IntraMuscular once  insulin lispro (ADMELOG) corrective regimen sliding scale   SubCutaneous three times a day before meals  insulin lispro (ADMELOG) corrective regimen sliding scale   SubCutaneous at bedtime  insulin lispro (ADMELOG) corrective regimen sliding scale   SubCutaneous every 6 hours  piperacillin/tazobactam IVPB.. 3.375 Gram(s) IV Intermittent every 8 hours    MEDICATIONS  (PRN):  acetaminophen     Tablet .. 650 milliGRAM(s) Oral every 6 hours PRN Temp greater or equal to 38C (100.4F), Mild Pain (1 - 3)  albuterol    90 MICROgram(s) HFA Inhaler 2 Puff(s) Inhalation every 6 hours PRN Bronchospasm  dextrose Oral Gel 15 Gram(s) Oral once PRN Blood Glucose LESS THAN 70 milliGRAM(s)/deciliter      Allergies    No Known Allergies    Intolerances        LABS:                        10.9   13.33 )-----------( 469      ( 10 Apr 2024 06:34 )             32.3     04-10    135  |  101  |  20  ----------------------------<  156<H>  4.4   |  22  |  1.09    Ca    9.0      10 Apr 2024 06:34  Phos  3.4     04-09  Mg     1.8     04-09      PT/INR - ( 10 Apr 2024 06:34 )   PT: 12.4 sec;   INR: 1.13 ratio         PTT - ( 10 Apr 2024 06:34 )  PTT:33.8 sec  Urinalysis Basic - ( 10 Apr 2024 06:34 )    Color: x / Appearance: x / SG: x / pH: x  Gluc: 156 mg/dL / Ketone: x  / Bili: x / Urobili: x   Blood: x / Protein: x / Nitrite: x   Leuk Esterase: x / RBC: x / WBC x   Sq Epi: x / Non Sq Epi: x / Bacteria: x        RADIOLOGY & ADDITIONAL TESTS:  Reviewed **************************************  Graeme Hoffman PGY 1  After 7PM, please contact night float at [phone] or [phone]  **************************************    INTERVAL HPI/OVERNIGHT EVENTS:  Patient was seen and examined at bedside. As per nurse and patient, no o/n events, patient resting comfortably. Saw patient in afternoon after the surgery. States insulin makes his blood sugar worse and is refusing finger sticks and insulin; patient educated and informed that insulin makes blood sugars better but is steadfast in refusing insulin. Patient denies: fever, chills, dizziness, weakness, HA, Changes in vision, CP, palpitations, SOB, cough, N/V/D/C, dysuria, changes in bowel movements, LE edema. ROS otherwise negative.    VITAL SIGNS:  T(F): 98 (04-10-24 @ 05:50)  HR: 70 (04-10-24 @ 07:27)  BP: 128/77 (04-10-24 @ 07:27)  RR: 18 (04-10-24 @ 07:27)  SpO2: 95% (04-10-24 @ 07:27)  Wt(kg): --    PHYSICAL EXAM:    Constitutional: WDWN, NAD  HEENT: PERRL, EOMI, sclera non-icteric, neck supple, trachea midline, no masses, no JVD, MMM, good dentition  Respiratory: CTA b/l, good air entry b/l, no wheezing, no rhonchi, no rales, without accessory muscle use and no intercostal retractions  Cardiovascular: RRR, normal S1S2, no M/R/G  Gastrointestinal: soft, NTND, no masses palpable, BS normal  Extremities: Warm, well perfused, no edema, no clubbing.   Neurological: AAOx3, CN Grossly intact  Skin: Normal temperature, warm, dry    MEDICATIONS  (STANDING):  aspirin  chewable 81 milliGRAM(s) Oral daily  chlorhexidine 2% Cloths 1 Application(s) Topical <User Schedule>  clopidogrel Tablet 75 milliGRAM(s) Oral daily  dextrose 5%. 1000 milliLiter(s) (50 mL/Hr) IV Continuous <Continuous>  dextrose 5%. 1000 milliLiter(s) (100 mL/Hr) IV Continuous <Continuous>  dextrose 50% Injectable 25 Gram(s) IV Push once  dextrose 50% Injectable 25 Gram(s) IV Push once  dextrose 50% Injectable 12.5 Gram(s) IV Push once  enoxaparin Injectable 40 milliGRAM(s) SubCutaneous every 24 hours  glucagon  Injectable 1 milliGRAM(s) IntraMuscular once  influenza   Vaccine 0.5 milliLiter(s) IntraMuscular once  insulin lispro (ADMELOG) corrective regimen sliding scale   SubCutaneous three times a day before meals  insulin lispro (ADMELOG) corrective regimen sliding scale   SubCutaneous at bedtime  insulin lispro (ADMELOG) corrective regimen sliding scale   SubCutaneous every 6 hours  piperacillin/tazobactam IVPB.. 3.375 Gram(s) IV Intermittent every 8 hours    MEDICATIONS  (PRN):  acetaminophen     Tablet .. 650 milliGRAM(s) Oral every 6 hours PRN Temp greater or equal to 38C (100.4F), Mild Pain (1 - 3)  albuterol    90 MICROgram(s) HFA Inhaler 2 Puff(s) Inhalation every 6 hours PRN Bronchospasm  dextrose Oral Gel 15 Gram(s) Oral once PRN Blood Glucose LESS THAN 70 milliGRAM(s)/deciliter      Allergies    No Known Allergies    Intolerances        LABS:                        10.9   13.33 )-----------( 469      ( 10 Apr 2024 06:34 )             32.3     04-10    135  |  101  |  20  ----------------------------<  156<H>  4.4   |  22  |  1.09    Ca    9.0      10 Apr 2024 06:34  Phos  3.4     04-09  Mg     1.8     04-09      PT/INR - ( 10 Apr 2024 06:34 )   PT: 12.4 sec;   INR: 1.13 ratio         PTT - ( 10 Apr 2024 06:34 )  PTT:33.8 sec  Urinalysis Basic - ( 10 Apr 2024 06:34 )    Color: x / Appearance: x / SG: x / pH: x  Gluc: 156 mg/dL / Ketone: x  / Bili: x / Urobili: x   Blood: x / Protein: x / Nitrite: x   Leuk Esterase: x / RBC: x / WBC x   Sq Epi: x / Non Sq Epi: x / Bacteria: x        RADIOLOGY & ADDITIONAL TESTS:  Reviewed

## 2024-04-10 NOTE — BRIEF OPERATIVE NOTE - COMMENTS
Pt is s/p left foot fifth metatarsal head resection, open   - High concern for residual infection   - High concern for viability   - Pod plan return to OR for staged procedure, VAC to be placed on Friday pending appearance

## 2024-04-10 NOTE — PROGRESS NOTE ADULT - ASSESSMENT
Mr. Menon is a 59 year old male active smoker with PMHx of  peripheral neuropathy, Type II DM,  anemia,  status post colectomy with colostomy 01/2024  due to rectal cancer under the care of Dr. Renny Purvis now admitted with osteomyelitis of the left foot.  MRI foot shows Findings suggestive of early osteomyelitis at the fifth metatarsal head and base of the proximal phalanx, deep to the soft tissue wound. Focal osteitis is also a differential consideration for the osseous findings. Podiatry pending resection with graft. Infectious disease consulted.    Labs show hemoglobin 11.6, hematocrit  34.7, MCV 83.6 platelet count 339, WBC 12.14 with neutrophilic predominance. Creatinine 1.12 with normal liver function.  HbA1c 6.8.  TTE with EF 69%.     Patient last seen in Hematology Oncology Clinic 04/02/2024  and oxaliplatin was discontinued due to peripheral neuropathy. Podiatry scheduled pt for left foot fifth metatarsal head resection with wound debridement and graft application 04/04/2024, DAMIEN concerning, vascular performed LLE angio, balloon angioplasty and stent to SFA 04/08/2024.       # Stage IV Rectal Adenocarcinoma   - Diagnosed 08/2023  - Invasive, moderately differentiated colonic adenocarcinoma  - Low probability of MSI-high  - HER-2-Mary Negative  - KRAS is Wild Type  - Metastatic to liver  - FOLFOX and weekly Cetuximab started 9/18/23  - PET-CT from 03/20/2024 shows that the patient is status post surgery with no abnormal FDG uptake in the pelvis postsurgical area.  More prominent uptake in the gallbladder fossa (early recurrence versus normal fluctuation of uptake ) and no other FDG avid lesion in the field-of-view.  Previously noted liver Mets are not presently seen.  - Was scheduled to receive 5FU, Leucovorin and Cetuximab 04/02/2024, therapy held due to concern for L foot infection and pt sent to Podiatry   - Plan to continue therapy once able to from infection standpoint, pt is aware     # Left Foot Osteomyelitis  # PVD  - Podiatry scheduled pt for left foot fifth metatarsal head resection with wound debridement and graft application, delayed  - Vascular surgery DAMIEN suggestive of moderate arterial flow limitation in the left lower extremity localizing to the iliofemoral distribution  --DAMIEN LLE with 0.65 indicating moderate lower extremity arterial vascular disease  - LLE angio, balloon angioplasty and stent to SFA 04/08/2024  - Antibiotics per ID and primary team     # Neutrophilia   - Due to infection  - Outpatient labs 04/02/2024 WBC 16.19 ANC 14,170  - Continue to monitor, improving     # Normocytic anemia  - Likely multifactorial, active infection, cancer, chemotherapy, dilutional   - Outpatient labs 04/02/2024 Hb 13.7, MCV 83.5   - Continue to monitor  - Transfuse to maintain Hb > 7     # Nicotine dependence, active cigarette smoker  - Counseling provided, pt aware of possible compromised wound healing       Thank you for allowing me to participate in the care of Mr. Menon, please do not hesitate to call or text me if you have further questions or concerns.     Gato Horvath MD  [facility]   Division of Hematology/Oncology  [address], [address]  [place] [address]  P: [phone]  F: [phone]    Attestation:    ----Pt evaluated including face-to-face interaction in addition to chart review, reviewing treatment plan, and managing the patient’s chronic diagnoses as listed in the assessment---- Mr. Menon is a 59 year old male active smoker with PMHx of  peripheral neuropathy, Type II DM,  anemia,  status post colectomy with colostomy 01/2024  due to rectal cancer under the care of Dr. Renny Purvis now admitted with osteomyelitis of the left foot.  MRI foot shows Findings suggestive of early osteomyelitis at the fifth metatarsal head and base of the proximal phalanx, deep to the soft tissue wound. Focal osteitis is also a differential consideration for the osseous findings. Podiatry pending resection with graft. Infectious disease consulted.    Labs show hemoglobin 11.6, hematocrit  34.7, MCV 83.6 platelet count 339, WBC 12.14 with neutrophilic predominance. Creatinine 1.12 with normal liver function.  HbA1c 6.8.  TTE with EF 69%.     Patient last seen in Hematology Oncology Clinic 04/02/2024  and oxaliplatin was discontinued due to peripheral neuropathy. Podiatry scheduled pt for left foot fifth metatarsal head resection with wound debridement and graft application 04/04/2024, DAMIEN concerning, vascular performed LLE angio, balloon angioplasty and stent to SFA 04/08/2024.       # Stage IV Rectal Adenocarcinoma   - Diagnosed 08/2023  - Invasive, moderately differentiated colonic adenocarcinoma  - Low probability of MSI-high  - HER-2-Mary Negative  - KRAS is Wild Type  - Metastatic to liver  - FOLFOX and weekly Cetuximab started 9/18/23  - PET-CT from 03/20/2024 shows that the patient is status post surgery with no abnormal FDG uptake in the pelvis postsurgical area.  More prominent uptake in the gallbladder fossa (early recurrence versus normal fluctuation of uptake ) and no other FDG avid lesion in the field-of-view.  Previously noted liver Mets are not presently seen.  - Was scheduled to receive 5FU, Leucovorin and Cetuximab 04/02/2024, therapy held due to concern for L foot infection and pt sent to Podiatry   - Plan to continue therapy once able to from infection standpoint, pt is aware     # Left Foot Osteomyelitis  # PVD  - Vascular surgery DAMIEN suggestive of moderate arterial flow limitation in the left lower extremity localizing to the iliofemoral distribution  --DAMIEN LLE with 0.65 indicating moderate lower extremity arterial vascular disease  - LLE angio, balloon angioplasty and stent to SFA 04/08/2024]  - Podiatry scheduled pt for left foot fifth metatarsal head resection with wound debridement and graft application, 04/11/2024   - Antibiotics per ID and primary team     # Neutrophilia   - Due to infection  - Outpatient labs 04/02/2024 WBC 16.19 ANC 14,170  - Continue to monitor, improving     # Normocytic anemia  - Likely multifactorial, active infection, cancer, chemotherapy, dilutional   - Outpatient labs 04/02/2024 Hb 13.7, MCV 83.5   - Continue to monitor  - Transfuse to maintain Hb > 7     # Nicotine dependence, active cigarette smoker  - Counseling provided, pt aware of possible compromised wound healing       Thank you for allowing me to participate in the care of Mr. Menon, please do not hesitate to call or text me if you have further questions or concerns.     Gato Horvath MD  [facility]   Division of Hematology/Oncology  [address], [address]  [place] [address]  P: [phone]  F: [phone]    Attestation:    ----Pt evaluated including face-to-face interaction in addition to chart review, reviewing treatment plan, and managing the patient’s chronic diagnoses as listed in the assessment----

## 2024-04-10 NOTE — PROGRESS NOTE ADULT - SUBJECTIVE AND OBJECTIVE BOX
Subjective: Pt seen and examined at bedside with surgical team.    Vital Signs Last 24 Hrs  T(C): 36.5 (10 Apr 2024 10:00), Max: 37 (09 Apr 2024 20:23)  T(F): 97.7 (10 Apr 2024 10:00), Max: 98.6 (09 Apr 2024 20:23)  HR: 58 (10 Apr 2024 10:00) (58 - 76)  BP: 111/56 (10 Apr 2024 10:00) (91/55 - 128/79)  BP(mean): 77 (10 Apr 2024 10:00) (69 - 91)  RR: 14 (10 Apr 2024 10:00) (14 - 18)  SpO2: 99% (10 Apr 2024 10:00) (95% - 100%)    Parameters below as of 10 Apr 2024 10:00  Patient On (Oxygen Delivery Method): room air        I&O's Detail    09 Apr 2024 07:01  -  10 Apr 2024 07:00  --------------------------------------------------------  IN:    IV PiggyBack: 150 mL    Oral Fluid: 240 mL  Total IN: 390 mL    OUT:  Total OUT: 0 mL    Total NET: 390 mL      10 Apr 2024 07:01  -  10 Apr 2024 10:20  --------------------------------------------------------  IN:  Total IN: 0 mL    OUT:    Voided (mL): 750 mL  Total OUT: 750 mL    Total NET: -750 mL      MEDICATIONS  (STANDING):  aspirin  chewable 81 milliGRAM(s) Oral daily  chlorhexidine 2% Cloths 1 Application(s) Topical <User Schedule>  clopidogrel Tablet 75 milliGRAM(s) Oral daily  influenza   Vaccine 0.5 milliLiter(s) IntraMuscular once  insulin lispro (ADMELOG) corrective regimen sliding scale   SubCutaneous three times a day before meals  insulin lispro (ADMELOG) corrective regimen sliding scale   SubCutaneous every 6 hours  lactated ringers. 1000 milliLiter(s) (75 mL/Hr) IV Continuous <Continuous>  piperacillin/tazobactam IVPB.. 3.375 Gram(s) IV Intermittent every 8 hours    MEDICATIONS  (PRN):  acetaminophen     Tablet .. 650 milliGRAM(s) Oral every 6 hours PRN Mild Pain (1 - 3)  HYDROmorphone  Injectable 0.5 milliGRAM(s) IV Push every 10 minutes PRN Moderate Pain (4 - 6)  HYDROmorphone  Injectable 0.5 milliGRAM(s) IV Push every 4 hours PRN Severe Pain (7 - 10)  ondansetron Injectable 4 milliGRAM(s) IV Push once PRN Nausea and/or Vomiting  oxycodone    5 mG/acetaminophen 325 mG 1 Tablet(s) Oral every 4 hours PRN Moderate Pain (4 - 6)      LABS:                        10.9   13.33 )-----------( 469      ( 10 Apr 2024 06:34 )             32.3     04-10    135  |  101  |  20  ----------------------------<  156<H>  4.4   |  22  |  1.09    Ca    9.0      10 Apr 2024 06:34  Phos  3.4     04-09  Mg     1.8     04-09      PT/INR - ( 10 Apr 2024 06:34 )   PT: 12.4 sec;   INR: 1.13 ratio         PTT - ( 10 Apr 2024 06:34 )  PTT:33.8 sec    Urinalysis Basic - ( 10 Apr 2024 06:34 )    Color: x / Appearance: x / SG: x / pH: x  Gluc: 156 mg/dL / Ketone: x  / Bili: x / Urobili: x   Blood: x / Protein: x / Nitrite: x   Leuk Esterase: x / RBC: x / WBC x   Sq Epi: x / Non Sq Epi: x / Bacteria: x      ABO Interpretation: O (04-10-24 @ 06:52)      Physical Exam:  General: not acutely distressed  Respiratory: nonlabored respirations  Cardiac: pulse present  Extremities: warm, motor function and sensation intact. R groin access site soft, nontender, R femoral pulse, LLE DP pulse

## 2024-04-10 NOTE — CONSULT NOTE ADULT - REASON FOR ADMISSION
Sent to the ER by PCP following concern for LEFT foot infection

## 2024-04-10 NOTE — PROGRESS NOTE ADULT - ASSESSMENT
Patient is a 59y Male active smoker with a hx of Stage IV rectal adenocarcinoma s/p colectomy with colostomy (1/2024) and T2DM c/b peripheral neuropathy who presented from PCP concerning for L foot infection, found to have L fifth metatarsal osteomyelitis. OR with podiatry pending vascular surgery. LLE angiogram with possible stent scheduled for 4/8. L foot 5th metatarsal head resection with graft application scheduled for 4/10.    PLANS:    # Osteomyelitis:   - In setting of diabetic foot wound. MRI demonstrating early osteomyelitis of L foot 5th metatarsal head and base of proximal phalanx.  - DAMIEN demonstrating LLE PAD  - BCx: NGTD  - Wound Cx: Klebsiella   - Podiatry following: OR for L foot 5th metatarsal head resection with graft application today  - Vascular surgery following: LLE angiogram with possible stent 4/8  - ID following: switch to Zosyn  - WBC elevated today will CTM and appreciate id recs; afebrile.    # Rectal adenocarcinoma:  - Stage IV Rectal Adenocarcinoma with metastasis to liver diagnosed 08/2023. Follows with Dr. Renny Purvis.  - Oncology following  - Chemotherapy held in setting of infection  - Follow-up outpatient.    # DM2:  - HgbA1c 6.8%  - Continue to monitor blood glucose levels  - Sliding Scale    # DVT ppx:  - Lovenox    [facility]

## 2024-04-10 NOTE — BRIEF OPERATIVE NOTE - OPERATION/FINDINGS
Pt is s/p left foot fifth metatarsal head resection, open   - Bone at proximal resection and bone biopsy was soft and of bad quality   - Limited intraop bleeding   - Ellipse out necrotic wound measuring 3.25cm x 2.5cm  - Evidence of soft tissue infection and 3cc pus expressed intraop   - Incision left open and packed with 1 inch iodoform packing, 4x4 gauze, ABD pad, mateo, and ACE bandage

## 2024-04-10 NOTE — PROGRESS NOTE ADULT - ASSESSMENT
Plan:   To OR today at 7:30am with Dr. Call for left foot 5th met head resection with debridement and graft application.   CXR on sunrise.  EKG on sunrise.  Medical/Cardiac clearance since 4/5 and 4/4, respectively, and documented in chart.  Consent signed and in chart.  Procedure was explained to patient in detail. All alternatives, risks and complications were discussed. All questions answered.

## 2024-04-10 NOTE — PROGRESS NOTE ADULT - SUBJECTIVE AND OBJECTIVE BOX
Podiatry Pager #: [phone]    Patient is a 59y old  Male who presents with a chief complaint of Sent to the ER by PCP following concern for LEFT foot infection (10 Apr 2024 06:40)      INTERVAL HPI/OVERNIGHT EVENTS:   Pt is scheduled for left foot 5th met head resection with debridement and graft application with Dr. Call at 7:30am. Patient is aware of procedure and is NPO since midnight.    MEDICATIONS  (STANDING):  aspirin  chewable 81 milliGRAM(s) Oral daily  chlorhexidine 2% Cloths 1 Application(s) Topical <User Schedule>  clopidogrel Tablet 75 milliGRAM(s) Oral daily  dextrose 5%. 1000 milliLiter(s) (50 mL/Hr) IV Continuous <Continuous>  dextrose 5%. 1000 milliLiter(s) (100 mL/Hr) IV Continuous <Continuous>  dextrose 50% Injectable 12.5 Gram(s) IV Push once  dextrose 50% Injectable 25 Gram(s) IV Push once  dextrose 50% Injectable 25 Gram(s) IV Push once  enoxaparin Injectable 40 milliGRAM(s) SubCutaneous every 24 hours  glucagon  Injectable 1 milliGRAM(s) IntraMuscular once  influenza   Vaccine 0.5 milliLiter(s) IntraMuscular once  insulin lispro (ADMELOG) corrective regimen sliding scale   SubCutaneous three times a day before meals  insulin lispro (ADMELOG) corrective regimen sliding scale   SubCutaneous at bedtime  insulin lispro (ADMELOG) corrective regimen sliding scale   SubCutaneous every 6 hours  piperacillin/tazobactam IVPB.. 3.375 Gram(s) IV Intermittent every 8 hours    MEDICATIONS  (PRN):  acetaminophen     Tablet .. 650 milliGRAM(s) Oral every 6 hours PRN Temp greater or equal to 38C (100.4F), Mild Pain (1 - 3)  albuterol    90 MICROgram(s) HFA Inhaler 2 Puff(s) Inhalation every 6 hours PRN Bronchospasm  dextrose Oral Gel 15 Gram(s) Oral once PRN Blood Glucose LESS THAN 70 milliGRAM(s)/deciliter      Allergies    No Known Allergies    Intolerances        Vital Signs Last 24 Hrs  T(C): 36.7 (10 Apr 2024 05:50), Max: 37 (09 Apr 2024 20:23)  T(F): 98 (10 Apr 2024 05:50), Max: 98.6 (09 Apr 2024 20:23)  HR: 70 (10 Apr 2024 05:50) (70 - 76)  BP: 128/77 (10 Apr 2024 05:50) (121/76 - 128/79)  BP(mean): --  RR: 18 (10 Apr 2024 05:50) (16 - 18)  SpO2: 95% (10 Apr 2024 05:50) (95% - 99%)    Parameters below as of 10 Apr 2024 05:50  Patient On (Oxygen Delivery Method): room air        LABS:                        10.9   13.33 )-----------( 469      ( 10 Apr 2024 06:34 )             32.3     04-09    135  |  101  |  18  ----------------------------<  164<H>  4.2   |  25  |  1.07    Ca    8.9      09 Apr 2024 09:33  Phos  3.4     04-09  Mg     1.8     04-09      PT/INR - ( 10 Apr 2024 06:34 )   PT: 12.4 sec;   INR: 1.13 ratio         PTT - ( 10 Apr 2024 06:34 )  PTT:33.8 sec  Urinalysis Basic - ( 09 Apr 2024 09:33 )    Color: x / Appearance: x / SG: x / pH: x  Gluc: 164 mg/dL / Ketone: x  / Bili: x / Urobili: x   Blood: x / Protein: x / Nitrite: x   Leuk Esterase: x / RBC: x / WBC x   Sq Epi: x / Non Sq Epi: x / Bacteria: x      CAPILLARY BLOOD GLUCOSE          RADIOLOGY & ADDITIONAL TESTS:

## 2024-04-10 NOTE — PROGRESS NOTE ADULT - SUBJECTIVE AND OBJECTIVE BOX
DATE OF SERVICE: 04-10-24 @ 15:36    Patient is a 59y old  Male who presents with a chief complaint of Sent to the ER by PCP following concern for LEFT foot infection (10 Apr 2024 12:30)      INTERVAL HISTORY: Feels ok.     REVIEW OF SYSTEMS:  CONSTITUTIONAL: No weakness  EYES/ENT: No visual changes;  No throat pain   NECK: No pain or stiffness  RESPIRATORY: No cough, wheezing; No shortness of breath  CARDIOVASCULAR: No chest pain or palpitations  GASTROINTESTINAL: No abdominal  pain. No nausea, vomiting, or hematemesis  GENITOURINARY: No dysuria, frequency or hematuria  NEUROLOGICAL: No stroke like symptoms  SKIN: No rashes    MEDICATIONS:        PHYSICAL EXAM:  T(C): 36.4 (04-10-24 @ 11:36), Max: 37 (04-09-24 @ 20:23)  HR: 56 (04-10-24 @ 11:36) (56 - 76)  BP: 103/62 (04-10-24 @ 11:36) (91/55 - 128/77)  RR: 18 (04-10-24 @ 11:36) (14 - 18)  SpO2: 98% (04-10-24 @ 11:36) (95% - 100%)  Wt(kg): --  I&O's Summary    09 Apr 2024 07:01  -  10 Apr 2024 07:00  --------------------------------------------------------  IN: 390 mL / OUT: 0 mL / NET: 390 mL    10 Apr 2024 07:01  -  10 Apr 2024 15:36  --------------------------------------------------------  IN: 120 mL / OUT: 750 mL / NET: -630 mL      Height (cm): 185.4 (04-10 @ 07:27)  Weight (kg): 83.5 (04-10 @ 07:27)  BMI (kg/m2): 24.3 (04-10 @ 07:27)  BSA (m2): 2.08 (04-10 @ 07:27)    Appearance: In no distress	  HEENT:    PERRL, EOMI	  Cardiovascular:  S1 S2, No JVD  Respiratory: Lungs clear to auscultation	  Gastrointestinal:  Soft, Non-tender, + BS	  Vascularature:  No edema of LE  Psychiatric: Appropriate affect   Neuro: no acute focal deficits                               10.9   13.33 )-----------( 469      ( 10 Apr 2024 06:34 )             32.3     04-10    135  |  101  |  20  ----------------------------<  156<H>  4.4   |  22  |  1.09    Ca    9.0      10 Apr 2024 06:34  Phos  3.4     04-09  Mg     1.8     04-09          Labs personally reviewed      ASSESSMENT/PLAN: 	    This is a 59 year old male PMH: Colon ca, Tobacco dependance.  Admitted for left foot pain. + Ulcer      1. Preop Risk Stratification - TTE unremarkable with preserved EF  - EKG non ischemic   - Reports good functional capacity prior to diabetic foot ulcer  - Elevated risk for mod risk LE angio and pods surgery, no contraindication to proceed  - s/p peripheral angio with BMS placement.   - L foot 5th metatarsal head resection with graft application   - Tolerated surgery well.    2.   Diabetic foot ulcer  - MRI foot  suggestive of early osteomyelitis   - C/w local wound care per Pod  - Abx as per primary team  - POD following-> s/p left foot fifth metatarsal head resection, open 4/10    3.  Dm2:  - HgbA1c 6.8  - ISS as per primary team    4 DVT ppx:  - Lovenox sq      Isabel Deleon, AG-NP   [facility]ager,  [facility]  Cardiovascular Medicine  [address], [address]  Available through call or text on [employer]  Office: [phone]

## 2024-04-11 LAB
ANION GAP SERPL CALC-SCNC: 11 MMOL/L — SIGNIFICANT CHANGE UP (ref 5–17)
BASOPHILS # BLD AUTO: 0.05 K/UL — SIGNIFICANT CHANGE UP (ref 0–0.2)
BASOPHILS NFR BLD AUTO: 0.4 % — SIGNIFICANT CHANGE UP (ref 0–2)
BUN SERPL-MCNC: 19 MG/DL — SIGNIFICANT CHANGE UP (ref 7–23)
CALCIUM SERPL-MCNC: 9.4 MG/DL — SIGNIFICANT CHANGE UP (ref 8.4–10.5)
CHLORIDE SERPL-SCNC: 101 MMOL/L — SIGNIFICANT CHANGE UP (ref 96–108)
CO2 SERPL-SCNC: 24 MMOL/L — SIGNIFICANT CHANGE UP (ref 22–31)
CREAT SERPL-MCNC: 1.14 MG/DL — SIGNIFICANT CHANGE UP (ref 0.5–1.3)
EGFR: 75 ML/MIN/1.73M2 — SIGNIFICANT CHANGE UP
EOSINOPHIL # BLD AUTO: 0.29 K/UL — SIGNIFICANT CHANGE UP (ref 0–0.5)
EOSINOPHIL NFR BLD AUTO: 2.4 % — SIGNIFICANT CHANGE UP (ref 0–6)
GLUCOSE SERPL-MCNC: 181 MG/DL — HIGH (ref 70–99)
HCT VFR BLD CALC: 31.3 % — LOW (ref 39–50)
HGB BLD-MCNC: 10.2 G/DL — LOW (ref 13–17)
IMM GRANULOCYTES NFR BLD AUTO: 0.7 % — SIGNIFICANT CHANGE UP (ref 0–0.9)
LYMPHOCYTES # BLD AUTO: 16.8 % — SIGNIFICANT CHANGE UP (ref 13–44)
LYMPHOCYTES # BLD AUTO: 2 K/UL — SIGNIFICANT CHANGE UP (ref 1–3.3)
MAGNESIUM SERPL-MCNC: 2.1 MG/DL — SIGNIFICANT CHANGE UP (ref 1.6–2.6)
MCHC RBC-ENTMCNC: 27.4 PG — SIGNIFICANT CHANGE UP (ref 27–34)
MCHC RBC-ENTMCNC: 32.6 GM/DL — SIGNIFICANT CHANGE UP (ref 32–36)
MCV RBC AUTO: 84.1 FL — SIGNIFICANT CHANGE UP (ref 80–100)
MONOCYTES # BLD AUTO: 0.68 K/UL — SIGNIFICANT CHANGE UP (ref 0–0.9)
MONOCYTES NFR BLD AUTO: 5.7 % — SIGNIFICANT CHANGE UP (ref 2–14)
NEUTROPHILS # BLD AUTO: 8.8 K/UL — HIGH (ref 1.8–7.4)
NEUTROPHILS NFR BLD AUTO: 74 % — SIGNIFICANT CHANGE UP (ref 43–77)
NIGHT BLUE STAIN TISS: SIGNIFICANT CHANGE UP
NIGHT BLUE STAIN TISS: SIGNIFICANT CHANGE UP
NRBC # BLD: 0 /100 WBCS — SIGNIFICANT CHANGE UP (ref 0–0)
PHOSPHATE SERPL-MCNC: 3.5 MG/DL — SIGNIFICANT CHANGE UP (ref 2.5–4.5)
PLATELET # BLD AUTO: 476 K/UL — HIGH (ref 150–400)
POTASSIUM SERPL-MCNC: 4.4 MMOL/L — SIGNIFICANT CHANGE UP (ref 3.5–5.3)
POTASSIUM SERPL-SCNC: 4.4 MMOL/L — SIGNIFICANT CHANGE UP (ref 3.5–5.3)
RBC # BLD: 3.72 M/UL — LOW (ref 4.2–5.8)
RBC # FLD: 11.9 % — SIGNIFICANT CHANGE UP (ref 10.3–14.5)
SODIUM SERPL-SCNC: 136 MMOL/L — SIGNIFICANT CHANGE UP (ref 135–145)
SPECIMEN SOURCE: SIGNIFICANT CHANGE UP
SPECIMEN SOURCE: SIGNIFICANT CHANGE UP
WBC # BLD: 11.9 K/UL — HIGH (ref 3.8–10.5)
WBC # FLD AUTO: 11.9 K/UL — HIGH (ref 3.8–10.5)

## 2024-04-11 RX ADMIN — PIPERACILLIN AND TAZOBACTAM 25 GRAM(S): 4; .5 INJECTION, POWDER, LYOPHILIZED, FOR SOLUTION INTRAVENOUS at 13:05

## 2024-04-11 RX ADMIN — HYDROMORPHONE HYDROCHLORIDE 0.5 MILLIGRAM(S): 2 INJECTION INTRAMUSCULAR; INTRAVENOUS; SUBCUTANEOUS at 09:28

## 2024-04-11 RX ADMIN — PIPERACILLIN AND TAZOBACTAM 25 GRAM(S): 4; .5 INJECTION, POWDER, LYOPHILIZED, FOR SOLUTION INTRAVENOUS at 21:09

## 2024-04-11 RX ADMIN — PIPERACILLIN AND TAZOBACTAM 25 GRAM(S): 4; .5 INJECTION, POWDER, LYOPHILIZED, FOR SOLUTION INTRAVENOUS at 05:22

## 2024-04-11 RX ADMIN — Medication 81 MILLIGRAM(S): at 11:25

## 2024-04-11 RX ADMIN — CLOPIDOGREL BISULFATE 75 MILLIGRAM(S): 75 TABLET, FILM COATED ORAL at 11:25

## 2024-04-11 RX ADMIN — HYDROMORPHONE HYDROCHLORIDE 0.5 MILLIGRAM(S): 2 INJECTION INTRAMUSCULAR; INTRAVENOUS; SUBCUTANEOUS at 16:23

## 2024-04-11 NOTE — PROGRESS NOTE ADULT - ASSESSMENT
Patient is a 59 year old male with PMH of stage IV colon cancer, s/p colectomy with ostomy 1/2024, peripheral neuropathy sent in from his podiatrist office with concern for osteomyelitis of the left foot.     L foot submet 5th wound with suspected early OM  Leukocytosis likely due to above   - s/p L foot wound explored by Podiatry - 1cm incision proximal to wound down to level of subq, mild serous drainage  - L foot Wcx - polymicrobial - Kleb oxytoca/Roultella ornithinolytica; Strep mitis/oralis grp, Peptoniphilus harei grp, Staph pettenkoferi  - MRI L foot suggestive of early OM at 5th metatarsal head and base of proximal phalanx, deep to soft tissue wound   - MRSA PCR screen negative, s/p vancomycin   - Bcx NGTD x2   - 4/8 s/p LLE angiogram, balloon angioplasty and stent to SFA  - 4/10 s/p L foot 5th met head resection, open    -- brief op note reviewed - bone at proximal resection and biopsy was soft and bad quality, noted with necrotic wound 3.25x2.5cm with evidence of soft tissue infection and 3cc pus expressed -- high concern for residual infection  - afebrile, WBC noted, elevated ESR/CRP (99/140)    Recommendations:   Follow OR culture (gram stain neg) and surg path   Podiatry planning to take back to OR for staged procedure  Continue zosyn 3.375g IV Q8h   Continue local wound care    Monitor temps/WBC      Cherelle Horvath M.D.  [facility], Division of Infectious Diseases  [phone]  After 5pm on weekdays and all day on weekends - please call [phone] Patient is a 59 year old male with PMH of stage IV colon cancer, s/p colectomy with ostomy 1/2024, peripheral neuropathy sent in from his podiatrist office with concern for osteomyelitis of the left foot.     L foot submet 5th wound with suspected early OM  Leukocytosis likely due to above   - s/p L foot wound explored by Podiatry - 1cm incision proximal to wound down to level of subq, mild serous drainage  - L foot Wcx - polymicrobial - Kleb oxytoca/Roultella ornithinolytica; Strep mitis/oralis grp, Peptoniphilus harei grp, Staph pettenkoferi  - MRI L foot suggestive of early OM at 5th metatarsal head and base of proximal phalanx, deep to soft tissue wound   - MRSA PCR screen negative, s/p vancomycin   - Bcx NGTD x2   - 4/8 s/p LLE angiogram, balloon angioplasty and stent to SFA  - 4/10 s/p L foot 5th met head resection, open    -- brief op note reviewed - bone at proximal resection and biopsy was soft and bad quality, noted with necrotic wound 3.25x2.5cm with evidence of soft tissue infection and 3cc pus expressed -- high concern for residual infection  - afebrile, WBC noted, elevated ESR/CRP (99/140)    Recommendations:   Follow OR culture (gram stain neg) and surg path   Podiatry planning to take back to OR for staged procedure  Continue zosyn 3.375g IV Q8h   Can place PICC Line as will likely need prolonged IV antibiotics   Continue local wound care    Monitor temps/WBC      Cherelle Horvath M.D.  [facility], Division of Infectious Diseases  [phone]  After 5pm on weekdays and all day on weekends - please call [phone]

## 2024-04-11 NOTE — PROGRESS NOTE ADULT - SUBJECTIVE AND OBJECTIVE BOX
[facility] DIVISION OF INFECTIOUS DISEASES  REGIS Khan Y. Patel, S. Shah, G. Melvin  [phone]  ([phone] - weekdays after 5pm and weekends)    Name: EDA CHAWLA  Age/Gender: 59y Male  MRN: [id number]    Interval History:  Patient seen and examined this morning.   No new complaints noted.  Notes reviewed, s/p OR yesterday.   No concerning overnight events  Afebrile   Allergies: No Known Allergies      Objective:  Vitals:   T(F): 98.6 (04-11-24 @ 05:15), Max: 98.9 (04-10-24 @ 21:30)  HR: 73 (04-11-24 @ 05:15) (56 - 78)  BP: 117/71 (04-11-24 @ 05:15) (103/62 - 137/73)  RR: 18 (04-11-24 @ 05:15) (18 - 18)  SpO2: 98% (04-11-24 @ 05:15) (97% - 98%)  Physical Examination:  General: no acute distress  HEENT: NC/AT, anicteric, neck supple  Respiratory: no acc muscle use, breathing comfortably  Cardiovascular: S1 and S2 present  Gastrointestinal: normal appearing, nondistended  Extremities: L foot dressing/ACE  Skin: no visible rash    Laboratory Studies:  CBC:                       10.2   11.90 )-----------( 476      ( 11 Apr 2024 07:42 )             31.3     WBC Trend:  11.90 04-11-24 @ 07:42  13.33 04-10-24 @ 06:34  13.41 04-09-24 @ 09:33  10.43 04-08-24 @ 03:50  9.60 04-07-24 @ 06:32  10.57 04-06-24 @ 07:28  9.88 04-05-24 @ 12:02    CMP: 04-11    136  |  101  |  19  ----------------------------<  181<H>  4.4   |  24  |  1.14    Ca    9.4      11 Apr 2024 07:42  Phos  3.5     04-11  Mg     2.1     04-11      Creatinine: 1.14 mg/dL (04-11-24 @ 07:42)  Creatinine: 1.09 mg/dL (04-10-24 @ 06:34)  Creatinine: 1.07 mg/dL (04-09-24 @ 09:33)  Creatinine: 1.08 mg/dL (04-08-24 @ 03:49)  Creatinine: 1.09 mg/dL (04-07-24 @ 06:32)  Creatinine: 1.11 mg/dL (04-06-24 @ 07:28)  Creatinine: 0.98 mg/dL (04-05-24 @ 11:59)    Microbiology: reviewed   Culture - Abscess with Gram Stain (collected 04-10-24 @ 12:30)  Source: .Abscess left foot abscess.  Gram Stain (04-10-24 @ 23:53):    No polymorphonuclear cells seen per low power field    No organisms seen per oil power field    Culture - Fungal, Other (collected 04-10-24 @ 12:30)  Source: .Other left foot abscess.  Preliminary Report (04-11-24 @ 08:32):    Testing in progress    Culture - Fungal, Tissue (collected 04-10-24 @ 12:22)  Source: .Tissue  Preliminary Report (04-11-24 @ 08:25):    Testing in progress    Culture - Tissue with Gram Stain (collected 04-10-24 @ 12:22)  Source: .Tissue left 5th proximal phalanx  Gram Stain (04-10-24 @ 23:36):    No polymorphonuclear cells seen per low power field    No organisms seen per oil power field    Culture - Abscess with Gram Stain (collected 04-02-24 @ 17:40)  Source: .Abscess left foot  Gram Stain (04-03-24 @ 05:56):    No polymorphonuclear leukocytes seen per low power field    Numerous Gram positive cocci in pairs seen per oil power field  Final Report (04-04-24 @ 23:56):    Few Klebsiella oxytoca/Raoultella ornithinolytica    Few Streptococcus mitis/oralis group "Susceptibilities not performed"    Moderate Peptoniphilus harei group "Susceptibilities not performed"    Rare Staphylococcus pettenkoferi "Susceptibilities not performed"  Organism: Klebsiella oxytoca /Raoutella ornithinolytica (04-04-24 @ 23:56)  Organism: Klebsiella oxytoca /Raoutella ornithinolytica (04-04-24 @ 23:56)      Method Type: TARIQ      -  Amoxicillin/Clavulanic Acid: S <=8/4      -  Ampicillin: R >16 These ampicillin results predict results for amoxicillin      -  Ampicillin/Sulbactam: S 8/4      -  Aztreonam: S <=4      -  Cefazolin: R >16      -  Cefepime: S <=2      -  Cefoxitin: S <=8      -  Ceftriaxone: S <=1      -  Ciprofloxacin: S <=0.25      -  Ertapenem: S <=0.5      -  Gentamicin: S <=2      -  Imipenem: S <=1      -  Levofloxacin: S <=0.5      -  Meropenem: S <=1      -  Piperacillin/Tazobactam: S <=8      -  Tobramycin: S <=2      -  Trimethoprim/Sulfamethoxazole: S <=0.5/9.5    Culture - Blood (collected 04-02-24 @ 17:24)  Source: .Blood Blood-Peripheral  Final Report (04-07-24 @ 23:01):    No growth at 5 days    Culture - Blood (collected 04-02-24 @ 17:17)  Source: .Blood Blood-Peripheral  Final Report (04-07-24 @ 23:01):    No growth at 5 days    Radiology: reviewed     Medications:  acetaminophen     Tablet .. 650 milliGRAM(s) Oral every 6 hours PRN  aspirin  chewable 81 milliGRAM(s) Oral daily  chlorhexidine 2% Cloths 1 Application(s) Topical <User Schedule>  clopidogrel Tablet 75 milliGRAM(s) Oral daily  HYDROmorphone  Injectable 0.5 milliGRAM(s) IV Push every 4 hours PRN  influenza   Vaccine 0.5 milliLiter(s) IntraMuscular once  insulin lispro (ADMELOG) corrective regimen sliding scale   SubCutaneous Before meals and at bedtime  oxycodone    5 mG/acetaminophen 325 mG 1 Tablet(s) Oral every 4 hours PRN  piperacillin/tazobactam IVPB.. 3.375 Gram(s) IV Intermittent every 8 hours    Current Antimicrobials:  piperacillin/tazobactam IVPB.. 3.375 Gram(s) IV Intermittent every 8 hours    Prior/Completed Antimicrobials:  cefepime   IVPB  piperacillin/tazobactam IVPB.  piperacillin/tazobactam IVPB.-  vancomycin  IVPB.

## 2024-04-11 NOTE — PROGRESS NOTE ADULT - ASSESSMENT
59M with left foot submet 5 wound to bone and cellulitis.   - Pt was seen and evaluated.   - Afebrile, WBC 11.90  - Left foot submet 5 wound to bone, fibronecrotic wound bed, tracking 1cm circumferentially, no malodor, no drainage, erythema to midfoot resolving.  - Left Foot X-Ray: No gas, no OM.  - Left Foot MRI: 5th met head OM, 5th proximal phalanx base OM.  - Left Foot Wound Culture: Klebsiella oxytoca/Raoultella ornithinolytica, Strep mitis/oralis, Peptoniphilus harei, Staph pettenkoferi.  - DAMIEN/PVR: RABI 1.1, LABI 0.65, RTBI 0.97, LTBI 0.3.  - Vasc recs, appreciated   - Pod Plan: Left foot partial fifth ray resection, wound debridement and graft application on Friday 4/12 at 0100pm with Dr. Call   - Documented medical and cardiac clearance for podiatric surgery under anesthesia, appreciated.   - NPO after midnight   - CBC, BMP, Coags, Type and screen in AM   - Chest xray and EKG ordered   - Discussed with attending. 59M with left foot submet 5 wound to bone and cellulitis.   - Pt was seen and evaluated.   - Afebrile, WBC 11.90  - Left foot submet 5 wound to bone, fibronecrotic wound bed, tracking 1cm circumferentially, no malodor, no drainage, erythema to midfoot resolving.  - Left Foot X-Ray: No gas, no OM.  - Left Foot MRI: 5th met head OM, 5th proximal phalanx base OM.  - Left Foot Wound Culture: Klebsiella oxytoca/Raoultella ornithinolytica, Strep mitis/oralis, Peptoniphilus harei, Staph pettenkoferi.  - DAMIEN/PVR: RABI 1.1, LABI 0.65, RTBI 0.97, LTBI 0.3.  - Vasc recs, appreciated   - Pod Plan: Return to OR for left foot partial fifth ray resection, wound debridement and graft application on Friday 4/12 at 1pm with Dr. Call   - Documented medical and cardiac clearance for podiatric surgery under anesthesia, appreciated.   - NPO after midnight   - CBC, BMP, Coags, Type and screen in AM   - Discussed with attending.

## 2024-04-11 NOTE — PROGRESS NOTE ADULT - ASSESSMENT
59M active smoker (48-pack-year history) hx stage IV colon cancer, s/p colectomy with ostomy 1/2024, peripheral neuropathy 2/2 neoadjuvant chemo, sent in from his podiatrist's office with concern for osteomyelitis of the left foot. Patient was previously unaware of any infection, but noticed a wound on his left foot before visiting his oncologist on Tuesday, when he had been due to start adjuvant chemotherapy. His oncologist sent him to the ED for evaluation. Patient denies any hx of foot wounds and has never seen a vascular surgeon. He ambulates without difficulty and denies claudication or rest pain. States he did not have any foot wounds one-three weeks ago. Patient with xray negative for left toe OM, however MRI sf left foot 5th met head OM, 5th proximal phalanx base OM. S/p incision and drainage of wound in ED by podiatry, now planning for left foot 5th metatarsal head resection with graft application vs LWC, pending vascular surgery recommendations. DAMIEN PVR 0.65 on left, left toe 0.30, cf moderate arterial flow limitation in the left lower extremity localizing to the iliofemoral distribution. s/p LLE angiogram, SFA stent placement on 4/8.      Recommendations:  - pain control PRN   - dressing change PRN   - diet as tolerated   - pain control PRN  - appreciate care per primary team   - please contact us with any questions or concerns    Discussed with vascular fellow on behalf of Dr. Rodriguez     Vascular Surgery   [phone]

## 2024-04-11 NOTE — PROGRESS NOTE ADULT - SUBJECTIVE AND OBJECTIVE BOX
**************************************  Graeme Hoffman PGY 1  After 7PM, please contact night float at [phone] or [phone]  **************************************    INTERVAL HPI/OVERNIGHT EVENTS:  Patient was seen and examined at bedside. As per nurse and patient, no o/n events, patient resting comfortably. No complaints at this time. Patient denies: fever, chills, dizziness, weakness, HA, Changes in vision, CP, palpitations, SOB, cough, N/V/D/C, dysuria, changes in bowel movements, LE edema. ROS otherwise negative.    VITAL SIGNS:  T(F): 98.6 (04-11-24 @ 05:15)  HR: 73 (04-11-24 @ 05:15)  BP: 117/71 (04-11-24 @ 05:15)  RR: 18 (04-11-24 @ 05:15)  SpO2: 98% (04-11-24 @ 05:15)  Wt(kg): --    PHYSICAL EXAM:    Constitutional: WDWN, NAD  HEENT: PERRL, EOMI, sclera non-icteric, neck supple, trachea midline, no masses, no JVD, MMM, good dentition  Respiratory: CTA b/l, good air entry b/l, no wheezing, no rhonchi, no rales, without accessory muscle use and no intercostal retractions  Cardiovascular: RRR, normal S1S2, no M/R/G  Gastrointestinal: soft, NTND, no masses palpable, BS normal  Extremities: Warm, well perfused, no edema, no clubbing.   Neurological: AAOx***, CN Grossly intact  Skin: Normal temperature, warm, dry    MEDICATIONS  (STANDING):  aspirin  chewable 81 milliGRAM(s) Oral daily  chlorhexidine 2% Cloths 1 Application(s) Topical <User Schedule>  clopidogrel Tablet 75 milliGRAM(s) Oral daily  influenza   Vaccine 0.5 milliLiter(s) IntraMuscular once  insulin lispro (ADMELOG) corrective regimen sliding scale   SubCutaneous Before meals and at bedtime  piperacillin/tazobactam IVPB.. 3.375 Gram(s) IV Intermittent every 8 hours    MEDICATIONS  (PRN):  acetaminophen     Tablet .. 650 milliGRAM(s) Oral every 6 hours PRN Mild Pain (1 - 3)  HYDROmorphone  Injectable 0.5 milliGRAM(s) IV Push every 4 hours PRN Severe Pain (7 - 10)  oxycodone    5 mG/acetaminophen 325 mG 1 Tablet(s) Oral every 4 hours PRN Moderate Pain (4 - 6)      Allergies    No Known Allergies    Intolerances        LABS:                        10.9   13.33 )-----------( 469      ( 10 Apr 2024 06:34 )             32.3     04-10    135  |  101  |  20  ----------------------------<  156<H>  4.4   |  22  |  1.09    Ca    9.0      10 Apr 2024 06:34  Phos  3.4     04-09  Mg     1.8     04-09      PT/INR - ( 10 Apr 2024 06:34 )   PT: 12.4 sec;   INR: 1.13 ratio         PTT - ( 10 Apr 2024 06:34 )  PTT:33.8 sec  Urinalysis Basic - ( 10 Apr 2024 06:34 )    Color: x / Appearance: x / SG: x / pH: x  Gluc: 156 mg/dL / Ketone: x  / Bili: x / Urobili: x   Blood: x / Protein: x / Nitrite: x   Leuk Esterase: x / RBC: x / WBC x   Sq Epi: x / Non Sq Epi: x / Bacteria: x        RADIOLOGY & ADDITIONAL TESTS:  Reviewed **************************************  Graeme Hoffman PGY 1  After 7PM, please contact night float at #[phone] or [phone]  **************************************    INTERVAL HPI/OVERNIGHT EVENTS:  Patient was seen and examined at bedside. As per nurse and patient, no o/n events, patient resting comfortably. No complaints at this time. Patient denies: fever, chills, dizziness, weakness, HA, Changes in vision, CP, palpitations, SOB, cough, N/V/D/C, dysuria, changes in bowel movements, LE edema. ROS otherwise negative. Ostomy functioning with gas and normal output.    VITAL SIGNS:  T(F): 98.6 (04-11-24 @ 05:15)  HR: 73 (04-11-24 @ 05:15)  BP: 117/71 (04-11-24 @ 05:15)  RR: 18 (04-11-24 @ 05:15)  SpO2: 98% (04-11-24 @ 05:15)  Wt(kg): --    PHYSICAL EXAM:    Constitutional: WDWN, NAD  HEENT: PERRL, EOMI, sclera non-icteric, neck supple, trachea midline, no masses, no JVD, MMM, good dentition  Respiratory: CTA b/l, good air entry b/l, no wheezing, no rhonchi, no rales, without accessory muscle use and no intercostal retractions  Cardiovascular: RRR, normal S1S2, no M/R/G  Gastrointestinal: soft, NTND, no masses palpable, BS normal  Extremities: Warm, well perfused, no edema, no clubbing.   Neurological: AAOx3, CN Grossly intact  Skin: Normal temperature, warm, dry    MEDICATIONS  (STANDING):  aspirin  chewable 81 milliGRAM(s) Oral daily  chlorhexidine 2% Cloths 1 Application(s) Topical <User Schedule>  clopidogrel Tablet 75 milliGRAM(s) Oral daily  influenza   Vaccine 0.5 milliLiter(s) IntraMuscular once  insulin lispro (ADMELOG) corrective regimen sliding scale   SubCutaneous Before meals and at bedtime  piperacillin/tazobactam IVPB.. 3.375 Gram(s) IV Intermittent every 8 hours    MEDICATIONS  (PRN):  acetaminophen     Tablet .. 650 milliGRAM(s) Oral every 6 hours PRN Mild Pain (1 - 3)  HYDROmorphone  Injectable 0.5 milliGRAM(s) IV Push every 4 hours PRN Severe Pain (7 - 10)  oxycodone    5 mG/acetaminophen 325 mG 1 Tablet(s) Oral every 4 hours PRN Moderate Pain (4 - 6)      Allergies    No Known Allergies    Intolerances        LABS:                        10.9   13.33 )-----------( 469      ( 10 Apr 2024 06:34 )             32.3     04-10    135  |  101  |  20  ----------------------------<  156<H>  4.4   |  22  |  1.09    Ca    9.0      10 Apr 2024 06:34  Phos  3.4     04-09  Mg     1.8     04-09      PT/INR - ( 10 Apr 2024 06:34 )   PT: 12.4 sec;   INR: 1.13 ratio         PTT - ( 10 Apr 2024 06:34 )  PTT:33.8 sec  Urinalysis Basic - ( 10 Apr 2024 06:34 )    Color: x / Appearance: x / SG: x / pH: x  Gluc: 156 mg/dL / Ketone: x  / Bili: x / Urobili: x   Blood: x / Protein: x / Nitrite: x   Leuk Esterase: x / RBC: x / WBC x   Sq Epi: x / Non Sq Epi: x / Bacteria: x        RADIOLOGY & ADDITIONAL TESTS:  Reviewed **************************************  Graeme Hoffman PGY 1  After 7PM, please contact night float at [phone] or [phone]  **************************************    INTERVAL HPI/OVERNIGHT EVENTS:  Patient was seen and examined at bedside. As per nurse and patient, no o/n events, patient resting comfortably. No complaints at this time. Patient denies: fever, chills, dizziness, weakness, HA, Changes in vision, CP, palpitations, SOB, cough, N/V/D/C, dysuria, changes in bowel movements, LE edema. ROS otherwise negative. Ostomy functioning with gas and normal output. Still refusing insulin and am fingerstick despite education and redirection by multiple providers including endocrine.    VITAL SIGNS:  T(F): 98.6 (04-11-24 @ 05:15)  HR: 73 (04-11-24 @ 05:15)  BP: 117/71 (04-11-24 @ 05:15)  RR: 18 (04-11-24 @ 05:15)  SpO2: 98% (04-11-24 @ 05:15)  Wt(kg): --    PHYSICAL EXAM:    Constitutional: WDWN, NAD  HEENT: PERRL, EOMI, sclera non-icteric, neck supple, trachea midline, no masses, no JVD, MMM, good dentition  Respiratory: CTA b/l, good air entry b/l, no wheezing, no rhonchi, no rales, without accessory muscle use and no intercostal retractions  Cardiovascular: RRR, normal S1S2, no M/R/G  Gastrointestinal: soft, NTND, no masses palpable, BS normal  Extremities: Warm, well perfused, no edema, no clubbing.   Neurological: AAOx3, CN Grossly intact  Skin: Normal temperature, warm, dry    MEDICATIONS  (STANDING):  aspirin  chewable 81 milliGRAM(s) Oral daily  chlorhexidine 2% Cloths 1 Application(s) Topical <User Schedule>  clopidogrel Tablet 75 milliGRAM(s) Oral daily  influenza   Vaccine 0.5 milliLiter(s) IntraMuscular once  insulin lispro (ADMELOG) corrective regimen sliding scale   SubCutaneous Before meals and at bedtime  piperacillin/tazobactam IVPB.. 3.375 Gram(s) IV Intermittent every 8 hours    MEDICATIONS  (PRN):  acetaminophen     Tablet .. 650 milliGRAM(s) Oral every 6 hours PRN Mild Pain (1 - 3)  HYDROmorphone  Injectable 0.5 milliGRAM(s) IV Push every 4 hours PRN Severe Pain (7 - 10)  oxycodone    5 mG/acetaminophen 325 mG 1 Tablet(s) Oral every 4 hours PRN Moderate Pain (4 - 6)      Allergies    No Known Allergies    Intolerances        LABS:                        10.9   13.33 )-----------( 469      ( 10 Apr 2024 06:34 )             32.3     04-10    135  |  101  |  20  ----------------------------<  156<H>  4.4   |  22  |  1.09    Ca    9.0      10 Apr 2024 06:34  Phos  3.4     04-09  Mg     1.8     04-09      PT/INR - ( 10 Apr 2024 06:34 )   PT: 12.4 sec;   INR: 1.13 ratio         PTT - ( 10 Apr 2024 06:34 )  PTT:33.8 sec  Urinalysis Basic - ( 10 Apr 2024 06:34 )    Color: x / Appearance: x / SG: x / pH: x  Gluc: 156 mg/dL / Ketone: x  / Bili: x / Urobili: x   Blood: x / Protein: x / Nitrite: x   Leuk Esterase: x / RBC: x / WBC x   Sq Epi: x / Non Sq Epi: x / Bacteria: x        RADIOLOGY & ADDITIONAL TESTS:  Reviewed

## 2024-04-11 NOTE — PROGRESS NOTE ADULT - SUBJECTIVE AND OBJECTIVE BOX
SUBJECTIVE / OVERNIGHT EVENTS:      sp OR yesterday, pending return to OR today    --------------------------------------------------------------------------------------------  LABS:                        10.2   11.90 )-----------( 476      ( 11 Apr 2024 07:42 )             31.3     04-11    136  |  101  |  19  ----------------------------<  181<H>  4.4   |  24  |  1.14    Ca    9.4      11 Apr 2024 07:42  Phos  3.5     04-11  Mg     2.1     04-11      PT/INR - ( 10 Apr 2024 06:34 )   PT: 12.4 sec;   INR: 1.13 ratio         PTT - ( 10 Apr 2024 06:34 )  PTT:33.8 sec  CAPILLARY BLOOD GLUCOSE      POCT Blood Glucose.: 244 mg/dL (10 Apr 2024 21:40)        Urinalysis Basic - ( 11 Apr 2024 07:42 )    Color: x / Appearance: x / SG: x / pH: x  Gluc: 181 mg/dL / Ketone: x  / Bili: x / Urobili: x   Blood: x / Protein: x / Nitrite: x   Leuk Esterase: x / RBC: x / WBC x   Sq Epi: x / Non Sq Epi: x / Bacteria: x        RADIOLOGY & ADDITIONAL TESTS:    Imaging Personally Reviewed:  [x] YES  [ ] NO    Consultant(s) Notes Reviewed:  [x] YES  [ ] NO    MEDICATIONS  (STANDING):  aspirin  chewable 81 milliGRAM(s) Oral daily  chlorhexidine 2% Cloths 1 Application(s) Topical <User Schedule>  clopidogrel Tablet 75 milliGRAM(s) Oral daily  influenza   Vaccine 0.5 milliLiter(s) IntraMuscular once  insulin lispro (ADMELOG) corrective regimen sliding scale   SubCutaneous Before meals and at bedtime  piperacillin/tazobactam IVPB.. 3.375 Gram(s) IV Intermittent every 8 hours    MEDICATIONS  (PRN):  acetaminophen     Tablet .. 650 milliGRAM(s) Oral every 6 hours PRN Mild Pain (1 - 3)  HYDROmorphone  Injectable 0.5 milliGRAM(s) IV Push every 4 hours PRN Severe Pain (7 - 10)  oxycodone    5 mG/acetaminophen 325 mG 1 Tablet(s) Oral every 4 hours PRN Moderate Pain (4 - 6)      Care Discussed with Consultants/Other Providers [x] YES  [ ] NO    Vital Signs Last 24 Hrs  T(C): 37 (11 Apr 2024 05:15), Max: 37.2 (10 Apr 2024 21:30)  T(F): 98.6 (11 Apr 2024 05:15), Max: 98.9 (10 Apr 2024 21:30)  HR: 73 (11 Apr 2024 05:15) (56 - 78)  BP: 117/71 (11 Apr 2024 05:15) (103/62 - 137/73)  BP(mean): 88 (10 Apr 2024 10:30) (88 - 88)  RR: 18 (11 Apr 2024 05:15) (16 - 18)  SpO2: 98% (11 Apr 2024 05:15) (96% - 98%)    Parameters below as of 11 Apr 2024 05:15  Patient On (Oxygen Delivery Method): room air      I&O's Summary    10 Apr 2024 07:01  -  11 Apr 2024 07:00  --------------------------------------------------------  IN: 120 mL / OUT: 2750 mL / NET: -2630 mL    PHYSICAL EXAM:  GENERAL: NAD, well-developed, comfortable  HEAD:  Atraumatic, Normocephalic  EYES: EOMI, PERRLA, conjunctiva and sclera clear  NECK: Supple, No JVD  CHEST/LUNG: Clear to auscultation bilaterally; No wheeze  HEART: Regular rate and rhythm; No murmurs, rubs, or gallops  ABDOMEN: Soft, Nontender, Nondistended; Bowel sounds present  NEURO: AAOx3, no focal weakness, 5/5 b/l extremity strength, b/l knee no arthritis, no effusion   EXTREMITIES:  LLE dsg c/d/i  SKIN: No rashes or lesions

## 2024-04-11 NOTE — PROGRESS NOTE ADULT - ASSESSMENT
Mr. Menon is a 59 year old male active smoker with PMHx of  peripheral neuropathy, Type II DM,  anemia,  status post colectomy with colostomy 01/2024  due to rectal cancer under the care of Dr. Renny Purvis now admitted with osteomyelitis of the left foot.  MRI foot shows Findings suggestive of early osteomyelitis at the fifth metatarsal head and base of the proximal phalanx, deep to the soft tissue wound. Focal osteitis is also a differential consideration for the osseous findings. Podiatry pending resection with graft. Infectious disease consulted.    Labs show hemoglobin 11.6, hematocrit  34.7, MCV 83.6 platelet count 339, WBC 12.14 with neutrophilic predominance. Creatinine 1.12 with normal liver function.  HbA1c 6.8.  TTE with EF 69%.     Patient last seen in Hematology Oncology Clinic 04/02/2024  and oxaliplatin was discontinued due to peripheral neuropathy. Podiatry scheduled pt for left foot fifth metatarsal head resection with wound debridement and graft application 04/04/2024, DAMIEN concerning, vascular performed LLE angio, balloon angioplasty and stent to SFA 04/08/2024.       # Stage IV Rectal Adenocarcinoma   - Diagnosed 08/2023  - Invasive, moderately differentiated colonic adenocarcinoma  - Low probability of MSI-high  - HER-2-Mary Negative  - KRAS is Wild Type  - Metastatic to liver  - FOLFOX and weekly Cetuximab started 9/18/23  - PET-CT from 03/20/2024 shows that the patient is status post surgery with no abnormal FDG uptake in the pelvis postsurgical area.  More prominent uptake in the gallbladder fossa (early recurrence versus normal fluctuation of uptake ) and no other FDG avid lesion in the field-of-view.  Previously noted liver Mets are not presently seen.  - Was scheduled to receive 5FU, Leucovorin and Cetuximab 04/02/2024, therapy held due to concern for L foot infection and pt sent to Podiatry   - Plan to continue therapy once able to from infection standpoint, pt is aware     # Left Foot Osteomyelitis  # PVD  - Vascular surgery DAMIEN suggestive of moderate arterial flow limitation in the left lower extremity localizing to the iliofemoral distribution  --DAMIEN LLE with 0.65 indicating moderate lower extremity arterial vascular disease  - LLE angio, balloon angioplasty and stent to SFA 04/08/2024]  - Podiatry scheduled pt for left foot fifth metatarsal head resection with wound debridement and graft application, 04/11/2024   - Antibiotics per ID and primary team     # Neutrophilia   - Due to infection  - Outpatient labs 04/02/2024 WBC 16.19 ANC 14,170  - Continue to monitor, improving     # Normocytic anemia  - Likely multifactorial, active infection, cancer, chemotherapy, dilutional   - Outpatient labs 04/02/2024 Hb 13.7, MCV 83.5   - Continue to monitor  - Transfuse to maintain Hb > 7     # Nicotine dependence, active cigarette smoker  - Counseling provided, pt aware of possible compromised wound healing       Thank you for allowing me to participate in the care of Mr. Menon, please do not hesitate to call or text me if you have further questions or concerns.     Gato Horvath MD  [facility]   Division of Hematology/Oncology  [address], [address]  [place] [address]  P: [phone]  F: [phone]    Attestation:    ----Pt evaluated including face-to-face interaction in addition to chart review, reviewing treatment plan, and managing the patient’s chronic diagnoses as listed in the assessment---- Mr. Menon is a 59 year old male active smoker with PMHx of  peripheral neuropathy, Type II DM,  anemia,  status post colectomy with colostomy 01/2024  due to rectal cancer under the care of Dr. Renny Purvis now admitted with osteomyelitis of the left foot.  MRI foot shows Findings suggestive of early osteomyelitis at the fifth metatarsal head and base of the proximal phalanx, deep to the soft tissue wound. Focal osteitis is also a differential consideration for the osseous findings. Podiatry pending resection with graft. Infectious disease consulted.    Labs show hemoglobin 11.6, hematocrit  34.7, MCV 83.6 platelet count 339, WBC 12.14 with neutrophilic predominance. Creatinine 1.12 with normal liver function.  HbA1c 6.8.  TTE with EF 69%.     Patient last seen in Hematology Oncology Clinic 04/02/2024  and oxaliplatin was discontinued due to peripheral neuropathy. Podiatry scheduled pt for left foot fifth metatarsal head resection with wound debridement and graft application 04/04/2024, DAMIEN concerning, vascular performed LLE angio, balloon angioplasty and stent to SFA 04/08/2024.     04/11/2024: s/p 5th metatarsal head and neck resection with podiatry, pending return to OR with podiatry today, noted Endo recs, counseled pt on need for glycemic control including for acute wound healing, but also chronically for optimal medical optimization given on going need for antineoplastic therapy, he was frustrated and would like to re-visit topic at later date, stated he believes insulin makes his blood sugars higher, delineated mechanism of therapy but continued frustration, will continue to  as appropriate.       # Stage IV Rectal Adenocarcinoma   - Diagnosed 08/2023  - Invasive, moderately differentiated colonic adenocarcinoma  - Low probability of MSI-high  - HER-2-Mary Negative  - KRAS is Wild Type  - Metastatic to liver  - FOLFOX and weekly Cetuximab started 9/18/23  - PET-CT from 03/20/2024 shows that the patient is status post surgery with no abnormal FDG uptake in the pelvis postsurgical area.  More prominent uptake in the gallbladder fossa (early recurrence versus normal fluctuation of uptake ) and no other FDG avid lesion in the field-of-view.  Previously noted liver Mets are not presently seen.  - Was scheduled to receive 5FU, Leucovorin and Cetuximab 04/02/2024, therapy held due to concern for L foot infection and pt sent to Podiatry   - Plan to continue therapy once able to from infection standpoint, pt is aware     # Left Foot Osteomyelitis  # PVD  - Vascular surgery DAMIEN suggestive of moderate arterial flow limitation in the left lower extremity localizing to the iliofemoral distribution  --DAMIEN LLE with 0.65 indicating moderate lower extremity arterial vascular disease  - LLE angio, balloon angioplasty and stent to SFA 04/08/2024]  - Podiatry s/p left foot fifth metatarsal head/neck resection with wound debridement and graft application, 04/11/2024   - Return to OR pending   - Antibiotics per ID and primary team     # Neutrophilia   - Due to infection  - Outpatient labs 04/02/2024 WBC 16.19 ANC 14,170  - Continue to monitor, improving     # Normocytic anemia  - Likely multifactorial, active infection, cancer, chemotherapy, dilutional   - Outpatient labs 04/02/2024 Hb 13.7, MCV 83.5   - Continue to monitor  - Transfuse to maintain Hb > 7     # Nicotine dependence, active cigarette smoker  - Counseling provided, pt aware of possible compromised wound healing     # Type II DM  - HBA1c 6.8  - inpatient Hyperglycemia noted  - Endocrinology following  - Counseling provided       Thank you for allowing me to participate in the care of Mr. Menon, please do not hesitate to call or text me if you have further questions or concerns.     Gato Horvath MD  [facility]   Division of Hematology/Oncology  [address] [place] [address], [address]  [place] [address]  P: [phone]  F: [phone]    Attestation:    ----Pt evaluated including face-to-face interaction in addition to chart review, reviewing treatment plan, and managing the patient’s chronic diagnoses as listed in the assessment----

## 2024-04-11 NOTE — PROGRESS NOTE ADULT - SUBJECTIVE AND OBJECTIVE BOX
DATE OF SERVICE: 04-11-24 @ 12:24    Patient is a 59y old  Male who presents with a chief complaint of Sent to the ER by PCP following concern for LEFT foot infection (11 Apr 2024 11:42)      INTERVAL HISTORY: Feels ok.     REVIEW OF SYSTEMS:  CONSTITUTIONAL: No weakness  EYES/ENT: No visual changes;  No throat pain   NECK: No pain or stiffness  RESPIRATORY: No cough, wheezing; No shortness of breath  CARDIOVASCULAR: No chest pain or palpitations  GASTROINTESTINAL: No abdominal  pain. No nausea, vomiting, or hematemesis  GENITOURINARY: No dysuria, frequency or hematuria  NEUROLOGICAL: No stroke like symptoms  SKIN: No rashes    	  MEDICATIONS:        PHYSICAL EXAM:  T(C): 36.7 (04-11-24 @ 12:15), Max: 37.2 (04-10-24 @ 21:30)  HR: 72 (04-11-24 @ 12:15) (72 - 78)  BP: 121/69 (04-11-24 @ 12:15) (117/71 - 137/73)  RR: 18 (04-11-24 @ 12:15) (18 - 18)  SpO2: 96% (04-11-24 @ 12:15) (96% - 98%)  Wt(kg): --  I&O's Summary    10 Apr 2024 07:01  -  11 Apr 2024 07:00  --------------------------------------------------------  IN: 120 mL / OUT: 2750 mL / NET: -2630 mL          Appearance: In no distress	  HEENT:    PERRL, EOMI	  Cardiovascular:  S1 S2, No JVD  Respiratory: Lungs clear to auscultation	  Gastrointestinal:  Soft, Non-tender, + BS	  Vascularature:  No edema of LE  Psychiatric: Appropriate affect   Neuro: no acute focal deficits                               10.2   11.90 )-----------( 476      ( 11 Apr 2024 07:42 )             31.3     04-11    136  |  101  |  19  ----------------------------<  181<H>  4.4   |  24  |  1.14    Ca    9.4      11 Apr 2024 07:42  Phos  3.5     04-11  Mg     2.1     04-11          Labs personally reviewed      ASSESSMENT/PLAN: 	      This is a 59 year old male PMH: Colon ca, Tobacco dependance.  Admitted for left foot pain. + Ulcer      1. Preop Risk Stratification - TTE unremarkable with preserved EF  - EKG non ischemic   - Reports good functional capacity prior to diabetic foot ulcer  - Elevated risk for mod risk LE angio and pods surgery, no contraindication to proceed  - s/p peripheral angio with BMS placement.   - L foot 5th metatarsal head resection with graft application   - Tolerated surgery well.  - Return to OR for left foot partial fifth ray resection, wound debridement and graft application on Friday 4/12 at 1pm with Dr. Call     2.   Diabetic foot ulcer  - MRI foot  suggestive of early osteomyelitis   - C/w local wound care per Pod  - Abx as per primary team  - POD following-> s/p left foot fifth metatarsal head resection, open 4/10  - Plan for left foot partial fifth ray resection, wound debridement and graft application on Friday 4/12 at 1pm with Dr. Call     3.  Dm2:  - HgbA1c 6.8  - ISS as per primary team    4 DVT ppx:  - Lovenox sq          GALA Medina-NP   Nuno Yeager DO [facility]  Cardiovascular Medicine  [address] [place] [address], [address]  Available through call or text on [employer] TE[employer]  Office: [phone]   DATE OF SERVICE: 04-11-24 @ 12:24    Patient is a 59y old  Male who presents with a chief complaint of Sent to the ER by PCP following concern for LEFT foot infection (11 Apr 2024 11:42)      INTERVAL HISTORY: Feels ok.     REVIEW OF SYSTEMS:  CONSTITUTIONAL: No weakness  EYES/ENT: No visual changes;  No throat pain   NECK: No pain or stiffness  RESPIRATORY: No cough, wheezing; No shortness of breath  CARDIOVASCULAR: No chest pain or palpitations  GASTROINTESTINAL: No abdominal  pain. No nausea, vomiting, or hematemesis  GENITOURINARY: No dysuria, frequency or hematuria  NEUROLOGICAL: No stroke like symptoms  SKIN: No rashes    	  MEDICATIONS:        PHYSICAL EXAM:  T(C): 36.7 (04-11-24 @ 12:15), Max: 37.2 (04-10-24 @ 21:30)  HR: 72 (04-11-24 @ 12:15) (72 - 78)  BP: 121/69 (04-11-24 @ 12:15) (117/71 - 137/73)  RR: 18 (04-11-24 @ 12:15) (18 - 18)  SpO2: 96% (04-11-24 @ 12:15) (96% - 98%)  Wt(kg): --  I&O's Summary    10 Apr 2024 07:01  -  11 Apr 2024 07:00  --------------------------------------------------------  IN: 120 mL / OUT: 2750 mL / NET: -2630 mL          Appearance: In no distress	  HEENT:    PERRL, EOMI	  Cardiovascular:  S1 S2, No JVD  Respiratory: Lungs clear to auscultation	  Gastrointestinal:  Soft, Non-tender, + BS	  Vascularature:  No edema of LE  Psychiatric: Appropriate affect   Neuro: no acute focal deficits                               10.2   11.90 )-----------( 476      ( 11 Apr 2024 07:42 )             31.3     04-11    136  |  101  |  19  ----------------------------<  181<H>  4.4   |  24  |  1.14    Ca    9.4      11 Apr 2024 07:42  Phos  3.5     04-11  Mg     2.1     04-11          Labs personally reviewed      ASSESSMENT/PLAN: 	  This is a 59 year old male PMH: Colon ca, Tobacco dependance.  Admitted for left foot pain. + Ulcer      1. Preop Risk Stratification - TTE unremarkable with preserved EF  - EKG non ischemic   - Reports good functional capacity prior to diabetic foot ulcer  - Elevated risk for mod risk LE angio and pods surgery, no contraindication to proceed  - s/p peripheral angio with BMS placement.   - L foot 5th metatarsal head resection with graft application   - Tolerated surgery well.  - Return to OR for left foot partial fifth ray resection, wound debridement and graft application on Friday 4/12 at 1pm with Dr. Call     2.   Diabetic foot ulcer  - MRI foot  suggestive of early osteomyelitis   - C/w local wound care per Pod  - Abx as per primary team  - POD following-> s/p left foot fifth metatarsal head resection, open 4/10  - Plan for left foot partial fifth ray resection, wound debridement and graft application on Friday 4/12 at 1pm with Dr. Call     3.  Dm2:  - HgbA1c 6.8  - ISS as per primary team    4 DVT ppx:  - Lovenox sq          GALA Medina-NP   Nuno Yeager DO [facility]  Cardiovascular Medicine  [address] [place] [address], [address]  Available through call or text on [employer] TEAM[employer]  Office: [phone]

## 2024-04-11 NOTE — PROGRESS NOTE ADULT - ASSESSMENT
59y old Male with history of T2DM not on meds, Colon cancer s/p colectomy and colostomy and peripheral neuropathy here with acute osteomyelitis of left metatarsal s/p resection on 4/10.     1. T2DM with hyperglycemia  - FS high at bedtime last night, morning serum glucose in the high 100s      2. Left 5th metatarsal osteomyelitis s/p amputation  - podiatry following, plans for OR again  - on Zosyn, ID following    Will continue to follow.    Catracho Mooney MD  [facility]- Division of Endocrinology    [address] [place] [address]    T [phone]  F [phone]       59y old Male with history of T2DM not on meds, Colon cancer s/p colectomy and colostomy and peripheral neuropathy here with acute osteomyelitis of left metatarsal s/p resection on 4/10.     1. T2DM with hyperglycemia  - FS high at bedtime last night, morning serum glucose in the high 100s  - Still not agreeing to any insulin  - He has agreed to outpatient follow up and treatment if his FS don't improve at home in 1-2 weeks  - Monitor FS as the patient allows    2. Left 5th metatarsal osteomyelitis s/p amputation  - podiatry following, plans for OR again  - on Zosyn, ID following    Will continue to follow.    Catracho Mooney MD  [facility] of Endocrinology    [address] [place] [address]    T [phone]  F [phone]

## 2024-04-11 NOTE — PROGRESS NOTE ADULT - SUBJECTIVE AND OBJECTIVE BOX
Vascular Surgery Progress Note     Subjective:  Patient seen and examined.       Vital Signs:  Vital Signs Last 24 Hrs  T(C): 37 (11 Apr 2024 05:15), Max: 37.2 (10 Apr 2024 21:30)  T(F): 98.6 (11 Apr 2024 05:15), Max: 98.9 (10 Apr 2024 21:30)  HR: 73 (11 Apr 2024 05:15) (56 - 78)  BP: 117/71 (11 Apr 2024 05:15) (103/62 - 137/73)  RR: 18 (11 Apr 2024 05:15) (18 - 18)  SpO2: 98% (11 Apr 2024 05:15) (97% - 98%)    Parameters below as of 11 Apr 2024 05:15  Patient On (Oxygen Delivery Method): room air        CAPILLARY BLOOD GLUCOSE      POCT Blood Glucose.: 244 mg/dL (10 Apr 2024 21:40)      I&O's Detail    10 Apr 2024 07:01  -  11 Apr 2024 07:00  --------------------------------------------------------  IN:    Oral Fluid: 120 mL  Total IN: 120 mL    OUT:    Voided (mL): 2750 mL  Total OUT: 2750 mL    Total NET: -2630 mL            Physical Exam:  General: not acutely distressed  Respiratory: nonlabored respirations  Cardiac: pulse present  Extremities: warm, motor function and sensation intact. R groin access site soft, nontender, R femoral pulse, LLE dressing strikethrough         Labs:    04-11    136  |  101  |  19  ----------------------------<  181<H>  4.4   |  24  |  1.14    Ca    9.4      11 Apr 2024 07:42  Phos  3.5     04-11  Mg     2.1     04-11                              10.2   11.90 )-----------( 476      ( 11 Apr 2024 07:42 )             31.3     PT/INR - ( 10 Apr 2024 06:34 )   PT: 12.4 sec;   INR: 1.13 ratio         PTT - ( 10 Apr 2024 06:34 )  PTT:33.8 sec

## 2024-04-11 NOTE — PROGRESS NOTE ADULT - SUBJECTIVE AND OBJECTIVE BOX
[facility] HEMATOLOGY/ONCOLOGY INPATIENT PROGRESS NOTE     Interval Hx:   04-11-24: Mr. Menon was seen at bedside today.    Meds:   MEDICATIONS  (STANDING):  aspirin  chewable 81 milliGRAM(s) Oral daily  chlorhexidine 2% Cloths 1 Application(s) Topical <User Schedule>  clopidogrel Tablet 75 milliGRAM(s) Oral daily  influenza   Vaccine 0.5 milliLiter(s) IntraMuscular once  insulin lispro (ADMELOG) corrective regimen sliding scale   SubCutaneous Before meals and at bedtime  piperacillin/tazobactam IVPB.. 3.375 Gram(s) IV Intermittent every 8 hours    MEDICATIONS  (PRN):  acetaminophen     Tablet .. 650 milliGRAM(s) Oral every 6 hours PRN Mild Pain (1 - 3)  HYDROmorphone  Injectable 0.5 milliGRAM(s) IV Push every 4 hours PRN Severe Pain (7 - 10)  oxycodone    5 mG/acetaminophen 325 mG 1 Tablet(s) Oral every 4 hours PRN Moderate Pain (4 - 6)    Vital Signs Last 24 Hrs  T(C): 37 (11 Apr 2024 05:15), Max: 37.2 (10 Apr 2024 21:30)  T(F): 98.6 (11 Apr 2024 05:15), Max: 98.9 (10 Apr 2024 21:30)  HR: 73 (11 Apr 2024 05:15) (56 - 78)  BP: 117/71 (11 Apr 2024 05:15) (91/55 - 137/73)  BP(mean): 88 (10 Apr 2024 10:30) (69 - 91)  RR: 18 (11 Apr 2024 05:15) (14 - 18)  SpO2: 98% (11 Apr 2024 05:15) (95% - 100%)    Parameters below as of 11 Apr 2024 05:15  Patient On (Oxygen Delivery Method): room air    Physical Exam:  Gen: NAD  HEENT: EOMI, MMM  Chest: equal chest rise, speaking full sentences   Cardiac: RR  Abd: Nondistended  Ext: LLE/foot cellulitis  Neuro: AAOx3, normal mood and affect     Labs:                        10.9   13.33 )-----------( 469      ( 10 Apr 2024 06:34 )             32.3     CBC Full  -  ( 10 Apr 2024 06:34 )  WBC Count : 13.33 K/uL  RBC Count : 3.90 M/uL  Hemoglobin : 10.9 g/dL  Hematocrit : 32.3 %  Platelet Count - Automated : 469 K/uL  Mean Cell Volume : 82.8 fl  Mean Cell Hemoglobin : 27.9 pg  Mean Cell Hemoglobin Concentration : 33.7 gm/dL    04-10    135  |  101  |  20  ----------------------------<  156<H>  4.4   |  22  |  1.09    Ca    9.0      10 Apr 2024 06:34  Phos  3.4     04-09  Mg     1.8     04-09      PT/INR - ( 10 Apr 2024 06:34 )   PT: 12.4 sec;   INR: 1.13 ratio         PTT - ( 10 Apr 2024 06:34 )  PTT:33.8 sec   [facility] HEMATOLOGY/ONCOLOGY INPATIENT PROGRESS NOTE     Interval Hx:   04-11-24: Mr. Menon was seen at bedside today, s/p 5th metatarsal head and neck resection with podiatry, pending return to OR with podiatry today, noted Endo recs, counseled pt on need for glycemic control including for acute wound healing, but also chronically for optimal medical optimization given on going need for antineoplastic therapy, he was frustrated and would like to re-visit topic at later date, stated he believes insulin makes his blood sugars higher, delineated mechanism of therapy but continued frustration, will continue to  as appropriate.     Meds:   MEDICATIONS  (STANDING):  aspirin  chewable 81 milliGRAM(s) Oral daily  chlorhexidine 2% Cloths 1 Application(s) Topical <User Schedule>  clopidogrel Tablet 75 milliGRAM(s) Oral daily  influenza   Vaccine 0.5 milliLiter(s) IntraMuscular once  insulin lispro (ADMELOG) corrective regimen sliding scale   SubCutaneous Before meals and at bedtime  piperacillin/tazobactam IVPB.. 3.375 Gram(s) IV Intermittent every 8 hours    MEDICATIONS  (PRN):  acetaminophen     Tablet .. 650 milliGRAM(s) Oral every 6 hours PRN Mild Pain (1 - 3)  HYDROmorphone  Injectable 0.5 milliGRAM(s) IV Push every 4 hours PRN Severe Pain (7 - 10)  oxycodone    5 mG/acetaminophen 325 mG 1 Tablet(s) Oral every 4 hours PRN Moderate Pain (4 - 6)    Vital Signs Last 24 Hrs  T(C): 37 (11 Apr 2024 05:15), Max: 37.2 (10 Apr 2024 21:30)  T(F): 98.6 (11 Apr 2024 05:15), Max: 98.9 (10 Apr 2024 21:30)  HR: 73 (11 Apr 2024 05:15) (56 - 78)  BP: 117/71 (11 Apr 2024 05:15) (91/55 - 137/73)  BP(mean): 88 (10 Apr 2024 10:30) (69 - 91)  RR: 18 (11 Apr 2024 05:15) (14 - 18)  SpO2: 98% (11 Apr 2024 05:15) (95% - 100%)    Parameters below as of 11 Apr 2024 05:15  Patient On (Oxygen Delivery Method): room air    Physical Exam:  Gen: NAD  HEENT: EOMI, MMM  Chest: equal chest rise, speaking full sentences   Cardiac: RR  Abd: Nondistended  Ext: LLE/foot cellulitis  Neuro: AAOx3, normal mood and affect     Labs:                        10.9   13.33 )-----------( 469      ( 10 Apr 2024 06:34 )             32.3     CBC Full  -  ( 10 Apr 2024 06:34 )  WBC Count : 13.33 K/uL  RBC Count : 3.90 M/uL  Hemoglobin : 10.9 g/dL  Hematocrit : 32.3 %  Platelet Count - Automated : 469 K/uL  Mean Cell Volume : 82.8 fl  Mean Cell Hemoglobin : 27.9 pg  Mean Cell Hemoglobin Concentration : 33.7 gm/dL    04-10    135  |  101  |  20  ----------------------------<  156<H>  4.4   |  22  |  1.09    Ca    9.0      10 Apr 2024 06:34  Phos  3.4     04-09  Mg     1.8     04-09      PT/INR - ( 10 Apr 2024 06:34 )   PT: 12.4 sec;   INR: 1.13 ratio         PTT - ( 10 Apr 2024 06:34 )  PTT:33.8 sec

## 2024-04-11 NOTE — PROGRESS NOTE ADULT - ASSESSMENT
Patient is a 59y Male active smoker with a hx of Stage IV rectal adenocarcinoma s/p colectomy with colostomy (1/2024) and T2DM c/b peripheral neuropathy who presented from PCP concerning for L foot infection, found to have L fifth metatarsal osteomyelitis. OR with podiatry pending vascular surgery. LLE angiogram with possible stent scheduled for 4/8. L foot 5th metatarsal head resection with graft application scheduled for 4/10.    PLANS:    # Osteomyelitis:   - In setting of diabetic foot wound. MRI demonstrating early osteomyelitis of L foot 5th metatarsal head and base of proximal phalanx.  - DAMIEN demonstrating LLE PAD  - BCx: NGTD  - Wound Cx: Klebsiella   - Podiatry following: sp OR for L foot 5th metatarsal head resection with graft application 4/10, return to OR pending  - Vascular surgery following: LLE angiogram balloon angioplasty and stent to SFA on 4/8  - ID following: switch to Zosyn  - WBC elevated today will CTM and appreciate id recs; afebrile.    # Rectal adenocarcinoma:  - Stage IV Rectal Adenocarcinoma with metastasis to liver diagnosed 08/2023. Follows with Dr. Renny Purvis.  - Oncology following  - Chemotherapy held in setting of infection  - Follow-up outpatient.    # DM2:  - HgbA1c 6.8%  - Continue to monitor blood glucose levels  - Sliding Scale    # DVT ppx:  - IPCs    [facility]  [phone]

## 2024-04-11 NOTE — PROGRESS NOTE ADULT - SUBJECTIVE AND OBJECTIVE BOX
Patient is a 59y old  Male who presents with a chief complaint of Sent to the ER by PCP following concern for LEFT foot infection (11 Apr 2024 11:31)       INTERVAL HPI/OVERNIGHT EVENTS:  Patient seen and evaluated at bedside.  Pt is resting comfortable in NAD. Denies N/V/F/C.      Allergies    No Known Allergies    Intolerances        Vital Signs Last 24 Hrs  T(C): 37 (11 Apr 2024 05:15), Max: 37.2 (10 Apr 2024 21:30)  T(F): 98.6 (11 Apr 2024 05:15), Max: 98.9 (10 Apr 2024 21:30)  HR: 73 (11 Apr 2024 05:15) (73 - 78)  BP: 117/71 (11 Apr 2024 05:15) (117/71 - 137/73)  BP(mean): --  RR: 18 (11 Apr 2024 05:15) (18 - 18)  SpO2: 98% (11 Apr 2024 05:15) (97% - 98%)    Parameters below as of 11 Apr 2024 05:15  Patient On (Oxygen Delivery Method): room air        LABS:                        10.2   11.90 )-----------( 476      ( 11 Apr 2024 07:42 )             31.3     04-11    136  |  101  |  19  ----------------------------<  181<H>  4.4   |  24  |  1.14    Ca    9.4      11 Apr 2024 07:42  Phos  3.5     04-11  Mg     2.1     04-11      PT/INR - ( 10 Apr 2024 06:34 )   PT: 12.4 sec;   INR: 1.13 ratio         PTT - ( 10 Apr 2024 06:34 )  PTT:33.8 sec  Urinalysis Basic - ( 11 Apr 2024 07:42 )    Color: x / Appearance: x / SG: x / pH: x  Gluc: 181 mg/dL / Ketone: x  / Bili: x / Urobili: x   Blood: x / Protein: x / Nitrite: x   Leuk Esterase: x / RBC: x / WBC x   Sq Epi: x / Non Sq Epi: x / Bacteria: x      CAPILLARY BLOOD GLUCOSE      POCT Blood Glucose.: 244 mg/dL (10 Apr 2024 21:40)      Lower Extremity Physical Exam:  Vascular: DP/PT 2/4, B/L, CFT <3  seconds B/L, Temperature gradient warm to cool B/L.   Neuro: Epicritic sensation diminished to the level of digits, B/L.  Musculoskeletal/Ortho: unremarkable   Skin: Left foot submet 5 wound to bone, fibronecrotic wound bed, tracking 1cm circumferentially, no malodor, no drainage, erythema to midfoot resolving. Right foot no open wounds, no acute signs of infection.     RADIOLOGY & ADDITIONAL TESTS:

## 2024-04-11 NOTE — PROGRESS NOTE ADULT - SUBJECTIVE AND OBJECTIVE BOX
[facility] Progress Note    MAR, chart notes, fingersticks and labs reviewed    Subjective:    Objective  Vital Signs  T(C): 36.7 (04-11-24 @ 12:15), Max: 37.2 (04-10-24 @ 21:30)  HR: 72 (04-11-24 @ 12:15) (72 - 78)  BP: 121/69 (04-11-24 @ 12:15) (117/71 - 137/73)  RR: 18 (04-11-24 @ 12:15) (18 - 18)  SpO2: 96% (04-11-24 @ 12:15) (96% - 98%)    Physical Exam  Gen: NAD, alert, awake  HEENT: NC/AT, EOMI  Neck: supple  Chest: breathing comfortably  Heart: +s1 +s2    Medications  acetaminophen     Tablet .. 650 milliGRAM(s) Oral every 6 hours PRN  aspirin  chewable 81 milliGRAM(s) Oral daily  chlorhexidine 2% Cloths 1 Application(s) Topical <User Schedule>  clopidogrel Tablet 75 milliGRAM(s) Oral daily  HYDROmorphone  Injectable 0.5 milliGRAM(s) IV Push every 4 hours PRN  influenza   Vaccine 0.5 milliLiter(s) IntraMuscular once  insulin lispro (ADMELOG) corrective regimen sliding scale   SubCutaneous Before meals and at bedtime  oxycodone    5 mG/acetaminophen 325 mG 1 Tablet(s) Oral every 4 hours PRN  piperacillin/tazobactam IVPB.. 3.375 Gram(s) IV Intermittent every 8 hours    Pertinent labs/Imaging  POCT Blood Glucose.: 244 mg/dL (04-10-24 @ 21:40)    eGFR: 75 mL/min/1.73m2 (04-11-24 @ 07:42)  eGFR: 79 mL/min/1.73m2 (04-10-24 @ 06:34)       [facility] Progress Note    MAR, chart notes, fingersticks and labs reviewed    Subjective: feels much better today, hoping he may go home Saturday and will closely monitor his FS    Objective  Vital Signs  T(C): 36.7 (04-11-24 @ 12:15), Max: 37.2 (04-10-24 @ 21:30)  HR: 72 (04-11-24 @ 12:15) (72 - 78)  BP: 121/69 (04-11-24 @ 12:15) (117/71 - 137/73)  RR: 18 (04-11-24 @ 12:15) (18 - 18)  SpO2: 96% (04-11-24 @ 12:15) (96% - 98%)    Physical Exam  Gen: NAD, alert, awake  HEENT: NC/AT, EOMI  Neck: supple  Chest: breathing comfortably  Heart: +s1 +s2    Medications  acetaminophen     Tablet .. 650 milliGRAM(s) Oral every 6 hours PRN  aspirin  chewable 81 milliGRAM(s) Oral daily  chlorhexidine 2% Cloths 1 Application(s) Topical <User Schedule>  clopidogrel Tablet 75 milliGRAM(s) Oral daily  HYDROmorphone  Injectable 0.5 milliGRAM(s) IV Push every 4 hours PRN  influenza   Vaccine 0.5 milliLiter(s) IntraMuscular once  insulin lispro (ADMELOG) corrective regimen sliding scale   SubCutaneous Before meals and at bedtime  oxycodone    5 mG/acetaminophen 325 mG 1 Tablet(s) Oral every 4 hours PRN  piperacillin/tazobactam IVPB.. 3.375 Gram(s) IV Intermittent every 8 hours    Pertinent labs/Imaging  POCT Blood Glucose.: 244 mg/dL (04-10-24 @ 21:40)    eGFR: 75 mL/min/1.73m2 (04-11-24 @ 07:42)  eGFR: 79 mL/min/1.73m2 (04-10-24 @ 06:34)

## 2024-04-12 LAB
ANION GAP SERPL CALC-SCNC: 13 MMOL/L — SIGNIFICANT CHANGE UP (ref 5–17)
APTT BLD: 32.4 SEC — SIGNIFICANT CHANGE UP (ref 24.5–35.6)
BLD GP AB SCN SERPL QL: NEGATIVE — SIGNIFICANT CHANGE UP
BUN SERPL-MCNC: 22 MG/DL — SIGNIFICANT CHANGE UP (ref 7–23)
CALCIUM SERPL-MCNC: 9.2 MG/DL — SIGNIFICANT CHANGE UP (ref 8.4–10.5)
CHLORIDE SERPL-SCNC: 100 MMOL/L — SIGNIFICANT CHANGE UP (ref 96–108)
CO2 SERPL-SCNC: 23 MMOL/L — SIGNIFICANT CHANGE UP (ref 22–31)
CREAT SERPL-MCNC: 1.11 MG/DL — SIGNIFICANT CHANGE UP (ref 0.5–1.3)
CULTURE RESULTS: ABNORMAL
EGFR: 77 ML/MIN/1.73M2 — SIGNIFICANT CHANGE UP
GLUCOSE BLDC GLUCOMTR-MCNC: 122 MG/DL — HIGH (ref 70–99)
GLUCOSE BLDC GLUCOMTR-MCNC: 308 MG/DL — HIGH (ref 70–99)
GLUCOSE SERPL-MCNC: 164 MG/DL — HIGH (ref 70–99)
GRAM STN FLD: ABNORMAL
HCT VFR BLD CALC: 29 % — LOW (ref 39–50)
HGB BLD-MCNC: 9.8 G/DL — LOW (ref 13–17)
INR BLD: 1.1 RATIO — SIGNIFICANT CHANGE UP (ref 0.85–1.18)
MCHC RBC-ENTMCNC: 28 PG — SIGNIFICANT CHANGE UP (ref 27–34)
MCHC RBC-ENTMCNC: 33.8 GM/DL — SIGNIFICANT CHANGE UP (ref 32–36)
MCV RBC AUTO: 82.9 FL — SIGNIFICANT CHANGE UP (ref 80–100)
NRBC # BLD: 0 /100 WBCS — SIGNIFICANT CHANGE UP (ref 0–0)
PLATELET # BLD AUTO: 476 K/UL — HIGH (ref 150–400)
POTASSIUM SERPL-MCNC: 4.7 MMOL/L — SIGNIFICANT CHANGE UP (ref 3.5–5.3)
POTASSIUM SERPL-SCNC: 4.7 MMOL/L — SIGNIFICANT CHANGE UP (ref 3.5–5.3)
PROTHROM AB SERPL-ACNC: 12.1 SEC — SIGNIFICANT CHANGE UP (ref 9.5–13)
RBC # BLD: 3.5 M/UL — LOW (ref 4.2–5.8)
RBC # FLD: 11.7 % — SIGNIFICANT CHANGE UP (ref 10.3–14.5)
RH IG SCN BLD-IMP: POSITIVE — SIGNIFICANT CHANGE UP
SODIUM SERPL-SCNC: 136 MMOL/L — SIGNIFICANT CHANGE UP (ref 135–145)
SPECIMEN SOURCE: SIGNIFICANT CHANGE UP
WBC # BLD: 10.35 K/UL — SIGNIFICANT CHANGE UP (ref 3.8–10.5)
WBC # FLD AUTO: 10.35 K/UL — SIGNIFICANT CHANGE UP (ref 3.8–10.5)

## 2024-04-12 PROCEDURE — 73630 X-RAY EXAM OF FOOT: CPT | Mod: 26,LT

## 2024-04-12 PROCEDURE — 88311 DECALCIFY TISSUE: CPT | Mod: 26

## 2024-04-12 PROCEDURE — 88305 TISSUE EXAM BY PATHOLOGIST: CPT | Mod: 26

## 2024-04-12 DEVICE — GRAFT FISH SKIN MICRO 38SQCM: Type: IMPLANTABLE DEVICE | Status: FUNCTIONAL

## 2024-04-12 RX ORDER — HYDROMORPHONE HYDROCHLORIDE 2 MG/ML
0.5 INJECTION INTRAMUSCULAR; INTRAVENOUS; SUBCUTANEOUS EVERY 4 HOURS
Refills: 0 | Status: DISCONTINUED | OUTPATIENT
Start: 2024-04-12 | End: 2024-04-16

## 2024-04-12 RX ORDER — ASPIRIN/CALCIUM CARB/MAGNESIUM 324 MG
81 TABLET ORAL DAILY
Refills: 0 | Status: DISCONTINUED | OUTPATIENT
Start: 2024-04-12 | End: 2024-04-16

## 2024-04-12 RX ORDER — PIPERACILLIN AND TAZOBACTAM 4; .5 G/20ML; G/20ML
3.38 INJECTION, POWDER, LYOPHILIZED, FOR SOLUTION INTRAVENOUS EVERY 8 HOURS
Refills: 0 | Status: DISCONTINUED | OUTPATIENT
Start: 2024-04-12 | End: 2024-04-13

## 2024-04-12 RX ORDER — OXYCODONE AND ACETAMINOPHEN 5; 325 MG/1; MG/1
1 TABLET ORAL EVERY 4 HOURS
Refills: 0 | Status: DISCONTINUED | OUTPATIENT
Start: 2024-04-12 | End: 2024-04-16

## 2024-04-12 RX ORDER — ACETAMINOPHEN 500 MG
650 TABLET ORAL EVERY 6 HOURS
Refills: 0 | Status: DISCONTINUED | OUTPATIENT
Start: 2024-04-12 | End: 2024-04-16

## 2024-04-12 RX ORDER — HYDROMORPHONE HYDROCHLORIDE 2 MG/ML
0.5 INJECTION INTRAMUSCULAR; INTRAVENOUS; SUBCUTANEOUS
Refills: 0 | Status: DISCONTINUED | OUTPATIENT
Start: 2024-04-12 | End: 2024-04-12

## 2024-04-12 RX ORDER — ERTAPENEM SODIUM 1 G/1
1000 INJECTION, POWDER, LYOPHILIZED, FOR SOLUTION INTRAMUSCULAR; INTRAVENOUS EVERY 24 HOURS
Refills: 0 | Status: DISCONTINUED | OUTPATIENT
Start: 2024-04-12 | End: 2024-04-16

## 2024-04-12 RX ORDER — ONDANSETRON 8 MG/1
4 TABLET, FILM COATED ORAL ONCE
Refills: 0 | Status: DISCONTINUED | OUTPATIENT
Start: 2024-04-12 | End: 2024-04-12

## 2024-04-12 RX ORDER — ERTAPENEM SODIUM 1 G/1
1000 INJECTION, POWDER, LYOPHILIZED, FOR SOLUTION INTRAMUSCULAR; INTRAVENOUS EVERY 24 HOURS
Refills: 0 | Status: DISCONTINUED | OUTPATIENT
Start: 2024-04-12 | End: 2024-04-12

## 2024-04-12 RX ADMIN — ERTAPENEM SODIUM 120 MILLIGRAM(S): 1 INJECTION, POWDER, LYOPHILIZED, FOR SOLUTION INTRAMUSCULAR; INTRAVENOUS at 15:18

## 2024-04-12 RX ADMIN — HYDROMORPHONE HYDROCHLORIDE 0.5 MILLIGRAM(S): 2 INJECTION INTRAMUSCULAR; INTRAVENOUS; SUBCUTANEOUS at 01:33

## 2024-04-12 RX ADMIN — ERTAPENEM SODIUM 120 MILLIGRAM(S): 1 INJECTION, POWDER, LYOPHILIZED, FOR SOLUTION INTRAMUSCULAR; INTRAVENOUS at 13:11

## 2024-04-12 RX ADMIN — Medication 81 MILLIGRAM(S): at 12:04

## 2024-04-12 RX ADMIN — PIPERACILLIN AND TAZOBACTAM 25 GRAM(S): 4; .5 INJECTION, POWDER, LYOPHILIZED, FOR SOLUTION INTRAVENOUS at 23:21

## 2024-04-12 RX ADMIN — PIPERACILLIN AND TAZOBACTAM 25 GRAM(S): 4; .5 INJECTION, POWDER, LYOPHILIZED, FOR SOLUTION INTRAVENOUS at 05:12

## 2024-04-12 RX ADMIN — CLOPIDOGREL BISULFATE 75 MILLIGRAM(S): 75 TABLET, FILM COATED ORAL at 12:04

## 2024-04-12 RX ADMIN — PIPERACILLIN AND TAZOBACTAM 25 GRAM(S): 4; .5 INJECTION, POWDER, LYOPHILIZED, FOR SOLUTION INTRAVENOUS at 17:31

## 2024-04-12 NOTE — PROGRESS NOTE ADULT - ASSESSMENT
Plan:   To OR today at 1pm with Dr. Call for left foot partial 5th ray resection with wound debridement and Kerecis graft application .   CXR on sunrise.  EKG on sunrise.  Medical/Cardiac clearance since 4/5 and 4/4, respectively, and documented in chart.  Consent signed and in chart.  Procedure was explained to patient in detail. All alternatives, risks and complications were discussed. All questions answered.

## 2024-04-12 NOTE — PRE-ANESTHESIA EVALUATION ADULT - BP NONINVASIVE MEAN (MM HG)
Ongoing SW/CM Assessment/Plan of Care Note     See SW/CM flowsheets for goals and other objective data.    Patient/Family discharge goal (s):  Goal #1: Communication facilitated  Goal #2: Psychosocial needs assessed       PT Recommendation:  Recommendation for Discharge: PT WI: Sub-acute nursing home       OT Recommendation:  Recommendations for Discharge: OT WI: Sub-acute nursing home       SLP Recommendation:  Recommendations for Discharge: SLP: con pending progress    Disposition:  Planned Discharge Destination: Location not determined at this time    Progress note:   7:53am: Voicemail from [facility] - denial due to no appropriate bed. SW to reach out to activated POA to seek alternative sites.     9:40am: SW spoke to pt's HCPOA/wife Belkis. SW emailed over list of [facility] to her and will wait from an update regarding further referrals.     Denials:  Marcial Yi  [facility]        
91
88
88

## 2024-04-12 NOTE — BRIEF OPERATIVE NOTE - NSICDXBRIEFPOSTOP_GEN_ALL_CORE_FT
POST-OP DIAGNOSIS:  Acute osteomyelitis 10-Apr-2024 08:52:33  Adeline White  
POST-OP DIAGNOSIS:  Acute osteomyelitis 10-Apr-2024 08:52:33  Adeline White

## 2024-04-12 NOTE — PRE-OP CHECKLIST - SELECT TESTS ORDERED
Mg, Phos/CBC/CMP/PT/PTT/INR/Type and Screen
morning labs sent. results pending
Results in MD note
Results in MD note

## 2024-04-12 NOTE — PRE-ANESTHESIA EVALUATION ADULT - NSANTHPEFT_GEN_ALL_CORE
NAD, denies pain to foot  lungs clear  cardiac RRR
FROM of neck, TMD>3FB, Good mouth opening  COR: S1S2  Lungs: CTA

## 2024-04-12 NOTE — PHYSICAL THERAPY INITIAL EVALUATION ADULT - PERTINENT HX OF CURRENT PROBLEM, REHAB EVAL
58yo Male active smoker with a hx of Stage IV rectal adenocarcinoma s/p colectomy with colostomy (1/2024) and T2DM c/b peripheral neuropathy who presented from PCP concerning for L foot infection, found to have L fifth metatarsal osteomyelitis.  Dx: Osteomyelitis, Rectal adenocarcinoma, Type 2 diabetes mellitus. Pt s/p incision and drainage of wound in ED by podiatry, now planning for left foot 5th metatarsal head resection with graft application vs LWC, pending vascular surgery recommendations. DAMIEN PVR 0.65 on left, left toe 0.30, cf moderate arterial flow limitation in the left lower extremity localizing to the iliofemoral distribution. s/p LLE angiogram, SFA stent placement on 4/8. Plan for OR today for left foot partial 5th ray resection with wound debridement and Kerecis graft application with MD Burnett. PT Wound Care Team to follow-up postoperatively for VAC application, will coordinate with Podiatry Team.    X-Ray L Foot 4/10/24 IMPRESSION: Patient is status post resection of the 5th metatarsal head and neck.  Duplex L LE Arteries Impression 4/5/24: The left DAMIEN of 0.65 indicates moderate lower extremity arterial vascular disease. The left superficial femoral artery is thrombosed in the distal thigh. There is a tardus parvus pattern of reduced arterial flow distal to this obstruction. There is a flow-limiting stenosis of the left posterior tibial artery in the proximal calf.

## 2024-04-12 NOTE — BRIEF OPERATIVE NOTE - OPERATION/FINDINGS
Pt is s/p left foot partial excision of 5th metatarsal, wound debridement and micronized kerecis graft application  - No pus noted  - Limited intraop bleeding  - Micronized kerecis graft applied with layer of adaptic stapled on top

## 2024-04-12 NOTE — PROGRESS NOTE ADULT - SUBJECTIVE AND OBJECTIVE BOX
[facility] HEMATOLOGY/ONCOLOGY INPATIENT PROGRESS NOTE     Interval Hx:   04-12-24: Mr. Menon was seen at bedside today.    Meds:   MEDICATIONS  (STANDING):  aspirin  chewable 81 milliGRAM(s) Oral daily  chlorhexidine 2% Cloths 1 Application(s) Topical <User Schedule>  clopidogrel Tablet 75 milliGRAM(s) Oral daily  influenza   Vaccine 0.5 milliLiter(s) IntraMuscular once  insulin lispro (ADMELOG) corrective regimen sliding scale   SubCutaneous Before meals and at bedtime  piperacillin/tazobactam IVPB.. 3.375 Gram(s) IV Intermittent every 8 hours    MEDICATIONS  (PRN):  acetaminophen     Tablet .. 650 milliGRAM(s) Oral every 6 hours PRN Mild Pain (1 - 3)  HYDROmorphone  Injectable 0.5 milliGRAM(s) IV Push every 4 hours PRN Severe Pain (7 - 10)  oxycodone    5 mG/acetaminophen 325 mG 1 Tablet(s) Oral every 4 hours PRN Moderate Pain (4 - 6)    Vital Signs Last 24 Hrs  T(C): 36.8 (12 Apr 2024 04:45), Max: 37.3 (11 Apr 2024 22:18)  T(F): 98.2 (12 Apr 2024 04:45), Max: 99.2 (11 Apr 2024 22:18)  HR: 69 (12 Apr 2024 04:45) (69 - 72)  BP: 118/76 (12 Apr 2024 04:45) (118/76 - 126/75)  BP(mean): --  RR: 18 (12 Apr 2024 04:45) (16 - 18)  SpO2: 99% (12 Apr 2024 04:45) (96% - 99%)    Parameters below as of 12 Apr 2024 04:45  Patient On (Oxygen Delivery Method): room air    Physical Exam:  Gen: NAD  HEENT: EOMI, MMM  Chest: equal chest rise, speaking full sentences   Cardiac: RR  Abd: Nondistended  Ext: LLE/foot cellulitis  Neuro: AAOx3, normal mood and affect     Labs:                        10.2   11.90 )-----------( 476      ( 11 Apr 2024 07:42 )             31.3     CBC Full  -  ( 11 Apr 2024 07:42 )  WBC Count : 11.90 K/uL  RBC Count : 3.72 M/uL  Hemoglobin : 10.2 g/dL  Hematocrit : 31.3 %  Platelet Count - Automated : 476 K/uL  Mean Cell Volume : 84.1 fl  Mean Cell Hemoglobin : 27.4 pg  Mean Cell Hemoglobin Concentration : 32.6 gm/dL  Auto Neutrophil # : 8.80 K/uL  Auto Lymphocyte # : 2.00 K/uL  Auto Monocyte # : 0.68 K/uL  Auto Eosinophil # : 0.29 K/uL  Auto Basophil # : 0.05 K/uL  Auto Neutrophil % : 74.0 %  Auto Lymphocyte % : 16.8 %  Auto Monocyte % : 5.7 %  Auto Eosinophil % : 2.4 %  Auto Basophil % : 0.4 %    04-11    136  |  101  |  19  ----------------------------<  181<H>  4.4   |  24  |  1.14    Ca    9.4      11 Apr 2024 07:42  Phos  3.5     04-11  Mg     2.1     04-11      PT/INR - ( 10 Apr 2024 06:34 )   PT: 12.4 sec;   INR: 1.13 ratio         PTT - ( 10 Apr 2024 06:34 )  PTT:33.8 sec   [facility] HEMATOLOGY/ONCOLOGY INPATIENT PROGRESS NOTE     Interval Hx:   04-12-24: Mr. Menon was seen at bedside today, awake and alert, no acute complaints, pending return to OR with podiatry today, we again revisited discussion regarding glycemic control and he stated he is steadfast in his decision for now to avoid insulin and will consider therapy after discharge with outpatient follow up with Endocrinology    Meds:   MEDICATIONS  (STANDING):  aspirin  chewable 81 milliGRAM(s) Oral daily  chlorhexidine 2% Cloths 1 Application(s) Topical <User Schedule>  clopidogrel Tablet 75 milliGRAM(s) Oral daily  influenza   Vaccine 0.5 milliLiter(s) IntraMuscular once  insulin lispro (ADMELOG) corrective regimen sliding scale   SubCutaneous Before meals and at bedtime  piperacillin/tazobactam IVPB.. 3.375 Gram(s) IV Intermittent every 8 hours    MEDICATIONS  (PRN):  acetaminophen     Tablet .. 650 milliGRAM(s) Oral every 6 hours PRN Mild Pain (1 - 3)  HYDROmorphone  Injectable 0.5 milliGRAM(s) IV Push every 4 hours PRN Severe Pain (7 - 10)  oxycodone    5 mG/acetaminophen 325 mG 1 Tablet(s) Oral every 4 hours PRN Moderate Pain (4 - 6)    Vital Signs Last 24 Hrs  T(C): 36.8 (12 Apr 2024 04:45), Max: 37.3 (11 Apr 2024 22:18)  T(F): 98.2 (12 Apr 2024 04:45), Max: 99.2 (11 Apr 2024 22:18)  HR: 69 (12 Apr 2024 04:45) (69 - 72)  BP: 118/76 (12 Apr 2024 04:45) (118/76 - 126/75)  BP(mean): --  RR: 18 (12 Apr 2024 04:45) (16 - 18)  SpO2: 99% (12 Apr 2024 04:45) (96% - 99%)    Parameters below as of 12 Apr 2024 04:45  Patient On (Oxygen Delivery Method): room air    Physical Exam:  Gen: NAD  HEENT: EOMI, MMM  Chest: equal chest rise, speaking full sentences   Cardiac: RR  Abd: Nondistended  Ext: LLE/foot cellulitis now surgical  Neuro: AAOx3, normal mood and affect     Labs:                        10.2   11.90 )-----------( 476      ( 11 Apr 2024 07:42 )             31.3     CBC Full  -  ( 11 Apr 2024 07:42 )  WBC Count : 11.90 K/uL  RBC Count : 3.72 M/uL  Hemoglobin : 10.2 g/dL  Hematocrit : 31.3 %  Platelet Count - Automated : 476 K/uL  Mean Cell Volume : 84.1 fl  Mean Cell Hemoglobin : 27.4 pg  Mean Cell Hemoglobin Concentration : 32.6 gm/dL  Auto Neutrophil # : 8.80 K/uL  Auto Lymphocyte # : 2.00 K/uL  Auto Monocyte # : 0.68 K/uL  Auto Eosinophil # : 0.29 K/uL  Auto Basophil # : 0.05 K/uL  Auto Neutrophil % : 74.0 %  Auto Lymphocyte % : 16.8 %  Auto Monocyte % : 5.7 %  Auto Eosinophil % : 2.4 %  Auto Basophil % : 0.4 %    04-11    136  |  101  |  19  ----------------------------<  181<H>  4.4   |  24  |  1.14    Ca    9.4      11 Apr 2024 07:42  Phos  3.5     04-11  Mg     2.1     04-11      PT/INR - ( 10 Apr 2024 06:34 )   PT: 12.4 sec;   INR: 1.13 ratio         PTT - ( 10 Apr 2024 06:34 )  PTT:33.8 sec

## 2024-04-12 NOTE — PROGRESS NOTE ADULT - SUBJECTIVE AND OBJECTIVE BOX
[facility] DIVISION OF INFECTIOUS DISEASES  REGIS Khan Y. Patel, S. Shah, G. Casimir  [phone]  ([phone] - weekdays after 5pm and weekends)    Name: EDA CHAWLA  Age/Gender: 59y Male  MRN: [id number]    Interval History:  Patient seen and examined this morning.   No new complaints, going to OR today.   Notes reviewed  No concerning overnight events  Afebrile   Allergies: No Known Allergies      Objective:  Vitals:   T(F): 98.2 (04-12-24 @ 04:45), Max: 99.2 (04-11-24 @ 22:18)  HR: 69 (04-12-24 @ 04:45) (69 - 72)  BP: 118/76 (04-12-24 @ 04:45) (118/76 - 126/75)  RR: 18 (04-12-24 @ 04:45) (16 - 18)  SpO2: 99% (04-12-24 @ 04:45) (96% - 99%)  Physical Examination:  General: no acute distress  HEENT: NC/AT, anicteric, neck supple  Respiratory: no acc muscle use, breathing comfortably  Cardiovascular: S1 and S2 present  Gastrointestinal: normal appearing, nondistended  Extremities: L foot dressing/ACE  Skin: no visible rash    Laboratory Studies:  CBC:                       9.8    10.35 )-----------( 476      ( 12 Apr 2024 06:40 )             29.0     WBC Trend:  10.35 04-12-24 @ 06:40  11.90 04-11-24 @ 07:42  13.33 04-10-24 @ 06:34  13.41 04-09-24 @ 09:33  10.43 04-08-24 @ 03:50  9.60 04-07-24 @ 06:32  10.57 04-06-24 @ 07:28  9.88 04-05-24 @ 12:02    CMP: 04-12    136  |  100  |  22  ----------------------------<  164<H>  4.7   |  23  |  1.11    Ca    9.2      12 Apr 2024 06:40  Phos  3.5     04-11  Mg     2.1     04-11      Creatinine: 1.11 mg/dL (04-12-24 @ 06:40)  Creatinine: 1.14 mg/dL (04-11-24 @ 07:42)  Creatinine: 1.09 mg/dL (04-10-24 @ 06:34)  Creatinine: 1.07 mg/dL (04-09-24 @ 09:33)  Creatinine: 1.08 mg/dL (04-08-24 @ 03:49)  Creatinine: 1.09 mg/dL (04-07-24 @ 06:32)  Creatinine: 1.11 mg/dL (04-06-24 @ 07:28)  Creatinine: 0.98 mg/dL (04-05-24 @ 11:59)    Microbiology: reviewed   Culture - Abscess with Gram Stain (collected 04-10-24 @ 12:30)  Source: .Abscess left foot abscess.  Gram Stain (04-10-24 @ 23:53):    No polymorphonuclear cells seen per low power field    No organisms seen per oil power field  Preliminary Report (04-11-24 @ 15:51):    No growth    Culture - Acid Fast - Other w/Smear (collected 04-10-24 @ 12:30)  Source: .Other left foot abscess.    Culture - Fungal, Other (collected 04-10-24 @ 12:30)  Source: .Other left foot abscess.  Preliminary Report (04-11-24 @ 08:32):    Testing in progress    Culture - Acid Fast - Tissue w/Smear (collected 04-10-24 @ 12:22)  Source: .Tissue    Culture - Fungal, Tissue (collected 04-10-24 @ 12:22)  Source: .Tissue  Preliminary Report (04-11-24 @ 08:25):    Testing in progress    Culture - Tissue with Gram Stain (collected 04-10-24 @ 12:22)  Source: .Tissue left 5th proximal phalanx  Gram Stain (04-10-24 @ 23:36):    No polymorphonuclear cells seen per low power field    No organisms seen per oil power field  Preliminary Report (04-11-24 @ 15:44):    No growth    Culture - Abscess with Gram Stain (collected 04-02-24 @ 17:40)  Source: .Abscess left foot  Gram Stain (04-03-24 @ 05:56):    No polymorphonuclear leukocytes seen per low power field    Numerous Gram positive cocci in pairs seen per oil power field  Final Report (04-04-24 @ 23:56):    Few Klebsiella oxytoca/Raoultella ornithinolytica    Few Streptococcus mitis/oralis group "Susceptibilities not performed"    Moderate Peptoniphilus harei group "Susceptibilities not performed"    Rare Staphylococcus pettenkoferi "Susceptibilities not performed"  Organism: Klebsiella oxytoca /Raoutella ornithinolytica (04-04-24 @ 23:56)  Organism: Klebsiella oxytoca /Raoutella ornithinolytica (04-04-24 @ 23:56)      Method Type: TARIQ      -  Amoxicillin/Clavulanic Acid: S <=8/4      -  Ampicillin: R >16 These ampicillin results predict results for amoxicillin      -  Ampicillin/Sulbactam: S 8/4      -  Aztreonam: S <=4      -  Cefazolin: R >16      -  Cefepime: S <=2      -  Cefoxitin: S <=8      -  Ceftriaxone: S <=1      -  Ciprofloxacin: S <=0.25      -  Ertapenem: S <=0.5      -  Gentamicin: S <=2      -  Imipenem: S <=1      -  Levofloxacin: S <=0.5      -  Meropenem: S <=1      -  Piperacillin/Tazobactam: S <=8      -  Tobramycin: S <=2      -  Trimethoprim/Sulfamethoxazole: S <=0.5/9.5    Culture - Blood (collected 04-02-24 @ 17:24)  Source: .Blood Blood-Peripheral  Final Report (04-07-24 @ 23:01):    No growth at 5 days    Culture - Blood (collected 04-02-24 @ 17:17)  Source: .Blood Blood-Peripheral  Final Report (04-07-24 @ 23:01):    No growth at 5 days    Radiology: reviewed     Medications:  acetaminophen     Tablet .. 650 milliGRAM(s) Oral every 6 hours PRN  aspirin  chewable 81 milliGRAM(s) Oral daily  chlorhexidine 2% Cloths 1 Application(s) Topical <User Schedule>  clopidogrel Tablet 75 milliGRAM(s) Oral daily  HYDROmorphone  Injectable 0.5 milliGRAM(s) IV Push every 4 hours PRN  influenza   Vaccine 0.5 milliLiter(s) IntraMuscular once  insulin lispro (ADMELOG) corrective regimen sliding scale   SubCutaneous Before meals and at bedtime  oxycodone    5 mG/acetaminophen 325 mG 1 Tablet(s) Oral every 4 hours PRN  piperacillin/tazobactam IVPB.. 3.375 Gram(s) IV Intermittent every 8 hours    Current Antimicrobials:  piperacillin/tazobactam IVPB.. 3.375 Gram(s) IV Intermittent every 8 hours    Prior/Completed Antimicrobials:  cefepime   IVPB  piperacillin/tazobactam IVPB.  piperacillin/tazobactam IVPB.-  vancomycin  IVPB.

## 2024-04-12 NOTE — BRIEF OPERATIVE NOTE - COMMENTS
Pt is s/p left foot partial excision of 5th metatarsal, wound debridement and micronized kerecis graft application  - High concern viability; capillary refill time to 5th digit 10 seconds  - Low concern residual bone or soft tissue infection  - Micronized kerecis graft applied with layer of adaptic stapled on top  - Pod plan: stable for discharge pending final vasc/ID recs.

## 2024-04-12 NOTE — PROGRESS NOTE ADULT - SUBJECTIVE AND OBJECTIVE BOX
Podiatry Pager #: [phone]    Patient is a 59y old  Male who presents with a chief complaint of Sent to the ER by PCP following concern for LEFT foot infection (12 Apr 2024 05:25)      INTERVAL HPI/OVERNIGHT EVENTS:   Pt is scheduled for left foot partial 5th ray resection with wound debridement and Kerecis graft application with Dr. Call at 1pm. Patient is aware of procedure and is NPO since midnight.    MEDICATIONS  (STANDING):  aspirin  chewable 81 milliGRAM(s) Oral daily  chlorhexidine 2% Cloths 1 Application(s) Topical <User Schedule>  clopidogrel Tablet 75 milliGRAM(s) Oral daily  influenza   Vaccine 0.5 milliLiter(s) IntraMuscular once  insulin lispro (ADMELOG) corrective regimen sliding scale   SubCutaneous Before meals and at bedtime  piperacillin/tazobactam IVPB.. 3.375 Gram(s) IV Intermittent every 8 hours    MEDICATIONS  (PRN):  acetaminophen     Tablet .. 650 milliGRAM(s) Oral every 6 hours PRN Mild Pain (1 - 3)  HYDROmorphone  Injectable 0.5 milliGRAM(s) IV Push every 4 hours PRN Severe Pain (7 - 10)  oxycodone    5 mG/acetaminophen 325 mG 1 Tablet(s) Oral every 4 hours PRN Moderate Pain (4 - 6)      Allergies    No Known Allergies    Intolerances        Vital Signs Last 24 Hrs  T(C): 36.8 (12 Apr 2024 04:45), Max: 37.3 (11 Apr 2024 22:18)  T(F): 98.2 (12 Apr 2024 04:45), Max: 99.2 (11 Apr 2024 22:18)  HR: 69 (12 Apr 2024 04:45) (69 - 72)  BP: 118/76 (12 Apr 2024 04:45) (118/76 - 126/75)  BP(mean): --  RR: 18 (12 Apr 2024 04:45) (16 - 18)  SpO2: 99% (12 Apr 2024 04:45) (96% - 99%)    Parameters below as of 12 Apr 2024 04:45  Patient On (Oxygen Delivery Method): room air        LABS:                        9.8    10.35 )-----------( 476      ( 12 Apr 2024 06:40 )             29.0     04-11    136  |  101  |  19  ----------------------------<  181<H>  4.4   |  24  |  1.14    Ca    9.4      11 Apr 2024 07:42  Phos  3.5     04-11  Mg     2.1     04-11      PT/INR - ( 12 Apr 2024 06:40 )   PT: 12.1 sec;   INR: 1.10 ratio         PTT - ( 12 Apr 2024 06:40 )  PTT:32.4 sec  Urinalysis Basic - ( 11 Apr 2024 07:42 )    Color: x / Appearance: x / SG: x / pH: x  Gluc: 181 mg/dL / Ketone: x  / Bili: x / Urobili: x   Blood: x / Protein: x / Nitrite: x   Leuk Esterase: x / RBC: x / WBC x   Sq Epi: x / Non Sq Epi: x / Bacteria: x      CAPILLARY BLOOD GLUCOSE          RADIOLOGY & ADDITIONAL TESTS:

## 2024-04-12 NOTE — PRE-ANESTHESIA EVALUATION ADULT - MALLAMPATI CLASS
Class I (easy) - visualization of the soft palate, fauces, uvula, and both anterior and posterior pillars
Class II - visualization of the soft palate, fauces, and uvula
Class II - visualization of the soft palate, fauces, and uvula

## 2024-04-12 NOTE — PHYSICAL THERAPY INITIAL EVALUATION ADULT - MODALITIES TREATMENT COMMENTS
PT WC consult received for VAC dressing application to Lt 5th metatarsal surgical wound, becky session well, wound received C/D/I, measured: 5.5cmx4.0cmx1.0cm, no erythema, no purulent, no malodor, covered 100% with graft, cleansed with NS, cavilon to periwound, adaptic touch to cover the graft and staplers, filled with black foam, tracked to Lt ante tibia with good seal reached at 75mmHg continuous

## 2024-04-12 NOTE — CHART NOTE - NSCHARTNOTEFT_GEN_A_CORE
No change in H&P over past 24 hours. Please see Anesthesia preop note.
Post Operative Note  Patient: EDA CHAWLA 59y (1965) Male   MRN: [id number]  Location: [facility] 5MON Overflow 34  Visit: 04-02-24 Inpatient  Date: 04-08-24 @ 17:09    Procedure: S/P LLE angiogram, SFA stent placement      Subjective:   Patient seen and examined at bedside. Patient states he tolerated a diet. He denies nausea, vomiting, chest pain, SOB.     Objective:  Vitals: T(F): 97.7 (04-08-24 @ 14:31), Max: 99.1 (04-07-24 @ 21:32)  HR: 75 (04-08-24 @ 14:31)  BP: 122/78 (04-08-24 @ 14:31) (113/64 - 162/77)  RR: 18 (04-08-24 @ 14:31)  SpO2: 97% (04-08-24 @ 14:31)  Vent Settings:     In:   04-07-24 @ 07:01  -  04-08-24 @ 07:00  --------------------------------------------------------  IN: 1060 mL      IV Fluids: dextrose 5%. 1000 milliLiter(s) (100 mL/Hr) IV Continuous <Continuous>  dextrose 5%. 1000 milliLiter(s) (50 mL/Hr) IV Continuous <Continuous>  lactated ringers. 1000 milliLiter(s) (75 mL/Hr) IV Continuous <Continuous>      Out:   04-07-24 @ 07:01  -  04-08-24 @ 07:00  --------------------------------------------------------  OUT: 100 mL      EBL:     Voided Urine:   04-07-24 @ 07:01  -  04-08-24 @ 07:00  --------------------------------------------------------  OUT: 100 mL      Ramírez Catheter: yes no   Drains:   HARLAN:    ,   Chest Tube:      NG Tube:         Physical Examination:  General: NAD, resting comfortably in bed  Respiratory: Nonlabored respirations  Cardio: pulse present   Vascular: extremities are warm and well perfused. Right groin access site soft, appropriately tender, LLE DP pulse    Imaging:  No post-op imaging studies    Assessment:  59yMale patient S/P LLE angiogram, SFA stent placement      Plan:  - IV abx  - Pain control PRN  - Diet: as tolerated   - plavix, ASA  - DVT ppx    Date/Time: 04-08-24 @ 17:09    Vascular Surgery   [phone]
Wound Care Team Note:    Request for wound care consult for foot/toe wound received and referred to Podiatry. Please refer to Podiatry for management. Will not follow.    Jennifer Fuentes NP-C, CWOCN via TEAMS
-Patient booked for left foot fifth metatarsal head resection with wound debridement and graft application with Dr Leiva at 4:00 PM tomorrow 4/4/24 .  - NPO after midnight  - Pre-op labs: CBC, BMP, PTT, PT and INR  - Please document medical clearance for podiatry procedure under anesthesia  - Discussed with attending

## 2024-04-12 NOTE — PROGRESS NOTE ADULT - SUBJECTIVE AND OBJECTIVE BOX
**************************************  Graeme Hoffman PGY 1  After 7PM, please contact night float at #[phone] or [phone]  **************************************    INTERVAL HPI/OVERNIGHT EVENTS:  Patient was seen and examined at bedside. As per nurse and patient, no o/n events, patient resting comfortably. No complaints at this time. Patient denies: fever, chills, dizziness, weakness, HA, Changes in vision, CP, palpitations, SOB, cough, N/V/D/C, dysuria, changes in bowel movements, LE edema. ROS otherwise negative.    VITAL SIGNS:  T(F): 98.2 (04-12-24 @ 04:45)  HR: 69 (04-12-24 @ 04:45)  BP: 118/76 (04-12-24 @ 04:45)  RR: 18 (04-12-24 @ 04:45)  SpO2: 99% (04-12-24 @ 04:45)  Wt(kg): --    PHYSICAL EXAM:    Constitutional: WDWN, NAD  HEENT: PERRL, EOMI, sclera non-icteric, neck supple, trachea midline, no masses, no JVD, MMM, good dentition  Respiratory: CTA b/l, good air entry b/l, no wheezing, no rhonchi, no rales, without accessory muscle use and no intercostal retractions  Cardiovascular: RRR, normal S1S2, no M/R/G  Gastrointestinal: soft, NTND, no masses palpable, BS normal  Extremities: Warm, well perfused, no edema, no clubbing.   Neurological: AAOx***, CN Grossly intact  Skin: Normal temperature, warm, dry    MEDICATIONS  (STANDING):  aspirin  chewable 81 milliGRAM(s) Oral daily  chlorhexidine 2% Cloths 1 Application(s) Topical <User Schedule>  clopidogrel Tablet 75 milliGRAM(s) Oral daily  influenza   Vaccine 0.5 milliLiter(s) IntraMuscular once  insulin lispro (ADMELOG) corrective regimen sliding scale   SubCutaneous Before meals and at bedtime  piperacillin/tazobactam IVPB.. 3.375 Gram(s) IV Intermittent every 8 hours    MEDICATIONS  (PRN):  acetaminophen     Tablet .. 650 milliGRAM(s) Oral every 6 hours PRN Mild Pain (1 - 3)  HYDROmorphone  Injectable 0.5 milliGRAM(s) IV Push every 4 hours PRN Severe Pain (7 - 10)  oxycodone    5 mG/acetaminophen 325 mG 1 Tablet(s) Oral every 4 hours PRN Moderate Pain (4 - 6)      Allergies    No Known Allergies    Intolerances        LABS:                        9.8    10.35 )-----------( 476      ( 12 Apr 2024 06:40 )             29.0     04-12    136  |  100  |  22  ----------------------------<  164<H>  4.7   |  23  |  1.11    Ca    9.2      12 Apr 2024 06:40  Phos  3.5     04-11  Mg     2.1     04-11      PT/INR - ( 12 Apr 2024 06:40 )   PT: 12.1 sec;   INR: 1.10 ratio         PTT - ( 12 Apr 2024 06:40 )  PTT:32.4 sec  Urinalysis Basic - ( 12 Apr 2024 06:40 )    Color: x / Appearance: x / SG: x / pH: x  Gluc: 164 mg/dL / Ketone: x  / Bili: x / Urobili: x   Blood: x / Protein: x / Nitrite: x   Leuk Esterase: x / RBC: x / WBC x   Sq Epi: x / Non Sq Epi: x / Bacteria: x        RADIOLOGY & ADDITIONAL TESTS:  Reviewed **************************************  Graeme Hoffman PGY 1  After 7PM, please contact night float at [phone] or [phone]  **************************************    INTERVAL HPI/OVERNIGHT EVENTS:  Patient was seen and examined at bedside. As per nurse and patient, no o/n events, patient resting comfortably. No complaints at this time. Stage two of surgery is today. Patient denies: fever, chills, dizziness, weakness, HA, Changes in vision, CP, palpitations, SOB, cough, N/V/D/C, dysuria, changes in bowel movements, LE edema. ROS otherwise negative.    VITAL SIGNS:  T(F): 98.2 (04-12-24 @ 04:45)  HR: 69 (04-12-24 @ 04:45)  BP: 118/76 (04-12-24 @ 04:45)  RR: 18 (04-12-24 @ 04:45)  SpO2: 99% (04-12-24 @ 04:45)  Wt(kg): --    PHYSICAL EXAM:    Constitutional: WDWN, NAD  HEENT: PERRL, EOMI, sclera non-icteric, neck supple, trachea midline, no masses, no JVD, MMM, good dentition  Respiratory: CTA b/l, good air entry b/l, no wheezing, no rhonchi, no rales, without accessory muscle use and no intercostal retractions  Cardiovascular: RRR, normal S1S2, no M/R/G  Gastrointestinal: soft, NTND, no masses palpable, BS normal  Extremities: Warm, well perfused, no edema, no clubbing.   Neurological: AAOx3, CN Grossly intact  Skin: Normal temperature, warm, dry    MEDICATIONS  (STANDING):  aspirin  chewable 81 milliGRAM(s) Oral daily  chlorhexidine 2% Cloths 1 Application(s) Topical <User Schedule>  clopidogrel Tablet 75 milliGRAM(s) Oral daily  influenza   Vaccine 0.5 milliLiter(s) IntraMuscular once  insulin lispro (ADMELOG) corrective regimen sliding scale   SubCutaneous Before meals and at bedtime  piperacillin/tazobactam IVPB.. 3.375 Gram(s) IV Intermittent every 8 hours    MEDICATIONS  (PRN):  acetaminophen     Tablet .. 650 milliGRAM(s) Oral every 6 hours PRN Mild Pain (1 - 3)  HYDROmorphone  Injectable 0.5 milliGRAM(s) IV Push every 4 hours PRN Severe Pain (7 - 10)  oxycodone    5 mG/acetaminophen 325 mG 1 Tablet(s) Oral every 4 hours PRN Moderate Pain (4 - 6)      Allergies    No Known Allergies    Intolerances        LABS:                        9.8    10.35 )-----------( 476      ( 12 Apr 2024 06:40 )             29.0     04-12    136  |  100  |  22  ----------------------------<  164<H>  4.7   |  23  |  1.11    Ca    9.2      12 Apr 2024 06:40  Phos  3.5     04-11  Mg     2.1     04-11      PT/INR - ( 12 Apr 2024 06:40 )   PT: 12.1 sec;   INR: 1.10 ratio         PTT - ( 12 Apr 2024 06:40 )  PTT:32.4 sec  Urinalysis Basic - ( 12 Apr 2024 06:40 )    Color: x / Appearance: x / SG: x / pH: x  Gluc: 164 mg/dL / Ketone: x  / Bili: x / Urobili: x   Blood: x / Protein: x / Nitrite: x   Leuk Esterase: x / RBC: x / WBC x   Sq Epi: x / Non Sq Epi: x / Bacteria: x        RADIOLOGY & ADDITIONAL TESTS:  Reviewed

## 2024-04-12 NOTE — PROGRESS NOTE ADULT - ASSESSMENT
Mr. Menon is a 59 year old male active smoker with PMHx of  peripheral neuropathy, Type II DM,  anemia,  status post colectomy with colostomy 01/2024  due to rectal cancer under the care of Dr. Renny Purvis now admitted with osteomyelitis of the left foot.  MRI foot shows Findings suggestive of early osteomyelitis at the fifth metatarsal head and base of the proximal phalanx, deep to the soft tissue wound. Focal osteitis is also a differential consideration for the osseous findings. Podiatry pending resection with graft. Infectious disease consulted.    Labs show hemoglobin 11.6, hematocrit  34.7, MCV 83.6 platelet count 339, WBC 12.14 with neutrophilic predominance. Creatinine 1.12 with normal liver function.  HbA1c 6.8.  TTE with EF 69%.     Patient last seen in Hematology Oncology Clinic 04/02/2024  and oxaliplatin was discontinued due to peripheral neuropathy. Podiatry scheduled pt for left foot fifth metatarsal head resection with wound debridement and graft application 04/04/2024, DAMIEN concerning, vascular performed LLE angio, balloon angioplasty and stent to SFA 04/08/2024.     04/11/2024: s/p 5th metatarsal head and neck resection with podiatry, pending return to OR with podiatry today, noted Endo recs, counseled pt on need for glycemic control including for acute wound healing, but also chronically for optimal medical optimization given on going need for antineoplastic therapy, he was frustrated and would like to re-visit topic at later date, stated he believes insulin makes his blood sugars higher, delineated mechanism of therapy but continued frustration, will continue to  as appropriate.       # Stage IV Rectal Adenocarcinoma   - Diagnosed 08/2023  - Invasive, moderately differentiated colonic adenocarcinoma  - Low probability of MSI-high  - HER-2-Mary Negative  - KRAS is Wild Type  - Metastatic to liver  - FOLFOX and weekly Cetuximab started 9/18/23  - PET-CT from 03/20/2024 shows that the patient is status post surgery with no abnormal FDG uptake in the pelvis postsurgical area.  More prominent uptake in the gallbladder fossa (early recurrence versus normal fluctuation of uptake ) and no other FDG avid lesion in the field-of-view.  Previously noted liver Mets are not presently seen.  - Was scheduled to receive 5FU, Leucovorin and Cetuximab 04/02/2024, therapy held due to concern for L foot infection and pt sent to Podiatry   - Plan to continue therapy once able to from infection standpoint, pt is aware     # Left Foot Osteomyelitis  # PVD  - Vascular surgery DAMIEN suggestive of moderate arterial flow limitation in the left lower extremity localizing to the iliofemoral distribution  --DAMIEN LLE with 0.65 indicating moderate lower extremity arterial vascular disease  - LLE angio, balloon angioplasty and stent to SFA 04/08/2024]  - Podiatry s/p left foot fifth metatarsal head/neck resection with wound debridement and graft application, 04/11/2024   - Return to OR pending   - Antibiotics per ID and primary team     # Neutrophilia   - Due to infection  - Outpatient labs 04/02/2024 WBC 16.19 ANC 14,170  - Continue to monitor, improving     # Normocytic anemia  - Likely multifactorial, active infection, cancer, chemotherapy, dilutional   - Outpatient labs 04/02/2024 Hb 13.7, MCV 83.5   - Continue to monitor  - Transfuse to maintain Hb > 7     # Nicotine dependence, active cigarette smoker  - Counseling provided, pt aware of possible compromised wound healing     # Type II DM  - HBA1c 6.8  - inpatient Hyperglycemia noted  - Endocrinology following  - Counseling provided       Thank you for allowing me to participate in the care of Mr. Menon, please do not hesitate to call or text me if you have further questions or concerns.     Gato Horvath MD  [facility] [place]   Division of Hematology/Oncology  [address] [place] [address], [address]  [place] [address]  P: [phone]  F: [phone]    Attestation:    ----Pt evaluated including face-to-face interaction in addition to chart review, reviewing treatment plan, and managing the patient’s chronic diagnoses as listed in the assessment---- Mr. Menon is a 59 year old male active smoker with PMHx of  peripheral neuropathy, Type II DM,  anemia,  status post colectomy with colostomy 01/2024  due to rectal cancer under the care of Dr. Renny Purvis now admitted with osteomyelitis of the left foot.  MRI foot shows Findings suggestive of early osteomyelitis at the fifth metatarsal head and base of the proximal phalanx, deep to the soft tissue wound. Focal osteitis is also a differential consideration for the osseous findings. Podiatry pending resection with graft. Infectious disease consulted.    Labs show hemoglobin 11.6, hematocrit  34.7, MCV 83.6 platelet count 339, WBC 12.14 with neutrophilic predominance. Creatinine 1.12 with normal liver function.  HbA1c 6.8.  TTE with EF 69%.     Patient last seen in Hematology Oncology Clinic 04/02/2024  and oxaliplatin was discontinued due to peripheral neuropathy. Podiatry scheduled pt for left foot fifth metatarsal head resection with wound debridement and graft application 04/04/2024, DAMIEN concerning, vascular performed LLE angio, balloon angioplasty and stent to SFA 04/08/2024.     04/11/2024: s/p 5th metatarsal head and neck resection with podiatry, pending return to OR with podiatry today, noted Endo recs, counseled pt on need for glycemic control including for acute wound healing, but also chronically for optimal medical optimization given on going need for antineoplastic therapy, he was frustrated and would like to re-visit topic at later date, stated he believes insulin makes his blood sugars higher, delineated mechanism of therapy but continued frustration, will continue to  as appropriate.       # Stage IV Rectal Adenocarcinoma   - Diagnosed 08/2023  - Invasive, moderately differentiated colonic adenocarcinoma  - Low probability of MSI-high  - HER-2-Mary Negative  - KRAS is Wild Type  - Metastatic to liver  - FOLFOX and weekly Cetuximab started 9/18/23  - PET-CT from 03/20/2024 shows that the patient is status post surgery with no abnormal FDG uptake in the pelvis postsurgical area.  More prominent uptake in the gallbladder fossa (early recurrence versus normal fluctuation of uptake ) and no other FDG avid lesion in the field-of-view.  Previously noted liver Mets are not presently seen.  - Was scheduled to receive 5FU, Leucovorin and Cetuximab 04/02/2024, therapy held due to concern for L foot infection and pt sent to Podiatry   - Plan to continue therapy once able to from infection standpoint, pt is aware     # Left Foot Osteomyelitis  # PVD  - Vascular surgery DAMIEN suggestive of moderate arterial flow limitation in the left lower extremity localizing to the iliofemoral distribution  --DAMIEN LLE with 0.65 indicating moderate lower extremity arterial vascular disease  - LLE angio, balloon angioplasty and stent to SFA 04/08/2024]  - Podiatry s/p left foot fifth metatarsal head/neck resection with wound debridement and graft application, 04/11/2024   - Return to OR pending 04/12/2024   - Antibiotics per ID and primary team     # Neutrophilia   - Due to infection  - Outpatient labs 04/02/2024 WBC 16.19 ANC 14,170  - Continue to monitor, improving     # Normocytic anemia  - Likely multifactorial, active infection, cancer, chemotherapy, dilutional   - Outpatient labs 04/02/2024 Hb 13.7, MCV 83.5   - Continue to monitor  - Transfuse to maintain Hb > 7     # Nicotine dependence, active cigarette smoker  - Counseling provided, pt aware of possible compromised wound healing     # Type II DM  - HBA1c 6.8  - inpatient Hyperglycemia noted  - Endocrinology following  - Counseling provided       Thank you for allowing me to participate in the care of Mr. Menon, please do not hesitate to call or text me if you have further questions or concerns.     Gato Horvath MD  [facility] [place]   Division of Hematology/Oncology  [address] [place] [address], [address] [place] [address]  P: [phone]  F: [phone]    Attestation:    ----Pt evaluated including face-to-face interaction in addition to chart review, reviewing treatment plan, and managing the patient’s chronic diagnoses as listed in the assessment----

## 2024-04-12 NOTE — PROGRESS NOTE ADULT - ASSESSMENT
Patient is a 59 year old male with PMH of stage IV colon cancer, s/p colectomy with ostomy 1/2024, peripheral neuropathy sent in from his podiatrist office with concern for osteomyelitis of the left foot.     L foot submet 5th wound with suspected early OM  Leukocytosis likely due to above and reactive postop  - s/p L foot wound explored by Podiatry - 1cm incision proximal to wound down to level of subq, mild serous drainage  - L foot Wcx - polymicrobial - Kleb oxytoca/Roultella ornithinolytica; Strep mitis/oralis grp, Peptoniphilus harei grp, Staph pettenkoferi  - MRI L foot suggestive of early OM at 5th metatarsal head and base of proximal phalanx, deep to soft tissue wound   - MRSA PCR screen negative, s/p vancomycin   - 4/8 s/p LLE angiogram, balloon angioplasty and stent to SFA  - 4/10 s/p L foot 5th met head resection, open    -- per brief op note - bone at proximal resection and biopsy was soft and bad quality, noted with necrotic wound 3.25x2.5cm with evidence of soft tissue infection and 3cc pus expressed -- high concern for residual infection  - afebrile, WBC improving, elevated ESR/CRP (99/140)    Recommendations:   Follow OR culture (NGTD) and surg path   Podiatry taking back to OR 4/12 for wound debridement and graft application   Place PICC Line as will need prolonged IV antibiotics   Discontinued zosyn  Started on Ertapenem 1g IV Q24h (for ease of dosing) to complete 6 week course on 5/23/24  - Will need weekly labs CBC/CMP/ESR/CRP while on IV antibiotics - fax  results to [phone].  - Patient will follow up with us in ID office within 1-2 weeks of discharge   Continue local wound care   Monitor temps/WBC    Over the weekend Dr. Taj Ernandez will be covering for our group. If you have any questions, concerns or new micro data, please reach out to them at [phone].    Cherelle Horvath M.D.  [facility], Division of Infectious Diseases  [phone]  After 5pm on weekdays and all day on weekends - please call [phone]

## 2024-04-12 NOTE — PROGRESS NOTE ADULT - SUBJECTIVE AND OBJECTIVE BOX
SUBJECTIVE / OVERNIGHT EVENTS:          --------------------------------------------------------------------------------------------  LABS:                        9.8    10.35 )-----------( 476      ( 12 Apr 2024 06:40 )             29.0     04-12    136  |  100  |  22  ----------------------------<  164<H>  4.7   |  23  |  1.11    Ca    9.2      12 Apr 2024 06:40  Phos  3.5     04-11  Mg     2.1     04-11      PT/INR - ( 12 Apr 2024 06:40 )   PT: 12.1 sec;   INR: 1.10 ratio         PTT - ( 12 Apr 2024 06:40 )  PTT:32.4 sec  CAPILLARY BLOOD GLUCOSE            Urinalysis Basic - ( 12 Apr 2024 06:40 )    Color: x / Appearance: x / SG: x / pH: x  Gluc: 164 mg/dL / Ketone: x  / Bili: x / Urobili: x   Blood: x / Protein: x / Nitrite: x   Leuk Esterase: x / RBC: x / WBC x   Sq Epi: x / Non Sq Epi: x / Bacteria: x        RADIOLOGY & ADDITIONAL TESTS:    Imaging Personally Reviewed:  [x] YES  [ ] NO    Consultant(s) Notes Reviewed:  [x] YES  [ ] NO    MEDICATIONS  (STANDING):  aspirin  chewable 81 milliGRAM(s) Oral daily  chlorhexidine 2% Cloths 1 Application(s) Topical <User Schedule>  clopidogrel Tablet 75 milliGRAM(s) Oral daily  influenza   Vaccine 0.5 milliLiter(s) IntraMuscular once  insulin lispro (ADMELOG) corrective regimen sliding scale   SubCutaneous Before meals and at bedtime  piperacillin/tazobactam IVPB.. 3.375 Gram(s) IV Intermittent every 8 hours    MEDICATIONS  (PRN):  acetaminophen     Tablet .. 650 milliGRAM(s) Oral every 6 hours PRN Mild Pain (1 - 3)  HYDROmorphone  Injectable 0.5 milliGRAM(s) IV Push every 4 hours PRN Severe Pain (7 - 10)  oxycodone    5 mG/acetaminophen 325 mG 1 Tablet(s) Oral every 4 hours PRN Moderate Pain (4 - 6)      Care Discussed with Consultants/Other Providers [x] YES  [ ] NO    Vital Signs Last 24 Hrs  T(C): 36.8 (12 Apr 2024 04:45), Max: 37.3 (11 Apr 2024 22:18)  T(F): 98.2 (12 Apr 2024 04:45), Max: 99.2 (11 Apr 2024 22:18)  HR: 69 (12 Apr 2024 04:45) (69 - 72)  BP: 118/76 (12 Apr 2024 04:45) (118/76 - 126/75)  BP(mean): --  RR: 18 (12 Apr 2024 04:45) (16 - 18)  SpO2: 99% (12 Apr 2024 04:45) (96% - 99%)    Parameters below as of 12 Apr 2024 04:45  Patient On (Oxygen Delivery Method): room air      I&O's Summary    11 Apr 2024 07:01  -  12 Apr 2024 07:00  --------------------------------------------------------  IN: 0 mL / OUT: 600 mL / NET: -600 mL    PHYSICAL EXAM:  GENERAL: NAD, well-developed, comfortable  HEAD:  Atraumatic, Normocephalic  EYES: EOMI, PERRLA, conjunctiva and sclera clear  NECK: Supple, No JVD  CHEST/LUNG: Clear to auscultation bilaterally; No wheeze  HEART: Regular rate and rhythm; No murmurs, rubs, or gallops  ABDOMEN: Soft, Nontender, Nondistended; Bowel sounds present  NEURO: AAOx3, no focal weakness, 5/5 b/l extremity strength, b/l knee no arthritis, no effusion   EXTREMITIES:  LLE dsg c/d/i  SKIN: No rashes or lesions       SUBJECTIVE / OVERNIGHT EVENTS:    patient seen and examined  resting comfortably in bed  plan to return to OR today    --------------------------------------------------------------------------------------------  LABS:                        9.8    10.35 )-----------( 476      ( 12 Apr 2024 06:40 )             29.0     04-12    136  |  100  |  22  ----------------------------<  164<H>  4.7   |  23  |  1.11    Ca    9.2      12 Apr 2024 06:40  Phos  3.5     04-11  Mg     2.1     04-11      PT/INR - ( 12 Apr 2024 06:40 )   PT: 12.1 sec;   INR: 1.10 ratio         PTT - ( 12 Apr 2024 06:40 )  PTT:32.4 sec  CAPILLARY BLOOD GLUCOSE            Urinalysis Basic - ( 12 Apr 2024 06:40 )    Color: x / Appearance: x / SG: x / pH: x  Gluc: 164 mg/dL / Ketone: x  / Bili: x / Urobili: x   Blood: x / Protein: x / Nitrite: x   Leuk Esterase: x / RBC: x / WBC x   Sq Epi: x / Non Sq Epi: x / Bacteria: x        RADIOLOGY & ADDITIONAL TESTS:    Imaging Personally Reviewed:  [x] YES  [ ] NO    Consultant(s) Notes Reviewed:  [x] YES  [ ] NO    MEDICATIONS  (STANDING):  aspirin  chewable 81 milliGRAM(s) Oral daily  chlorhexidine 2% Cloths 1 Application(s) Topical <User Schedule>  clopidogrel Tablet 75 milliGRAM(s) Oral daily  influenza   Vaccine 0.5 milliLiter(s) IntraMuscular once  insulin lispro (ADMELOG) corrective regimen sliding scale   SubCutaneous Before meals and at bedtime  piperacillin/tazobactam IVPB.. 3.375 Gram(s) IV Intermittent every 8 hours    MEDICATIONS  (PRN):  acetaminophen     Tablet .. 650 milliGRAM(s) Oral every 6 hours PRN Mild Pain (1 - 3)  HYDROmorphone  Injectable 0.5 milliGRAM(s) IV Push every 4 hours PRN Severe Pain (7 - 10)  oxycodone    5 mG/acetaminophen 325 mG 1 Tablet(s) Oral every 4 hours PRN Moderate Pain (4 - 6)      Care Discussed with Consultants/Other Providers [x] YES  [ ] NO    Vital Signs Last 24 Hrs  T(C): 36.8 (12 Apr 2024 04:45), Max: 37.3 (11 Apr 2024 22:18)  T(F): 98.2 (12 Apr 2024 04:45), Max: 99.2 (11 Apr 2024 22:18)  HR: 69 (12 Apr 2024 04:45) (69 - 72)  BP: 118/76 (12 Apr 2024 04:45) (118/76 - 126/75)  BP(mean): --  RR: 18 (12 Apr 2024 04:45) (16 - 18)  SpO2: 99% (12 Apr 2024 04:45) (96% - 99%)    Parameters below as of 12 Apr 2024 04:45  Patient On (Oxygen Delivery Method): room air      I&O's Summary    11 Apr 2024 07:01  -  12 Apr 2024 07:00  --------------------------------------------------------  IN: 0 mL / OUT: 600 mL / NET: -600 mL    PHYSICAL EXAM:  GENERAL: NAD, well-developed, comfortable  HEAD:  Atraumatic, Normocephalic  EYES: EOMI, PERRLA, conjunctiva and sclera clear  NECK: Supple, No JVD  CHEST/LUNG: Clear to auscultation bilaterally; No wheeze  HEART: Regular rate and rhythm; No murmurs, rubs, or gallops  ABDOMEN: Soft, Nontender, Nondistended; Bowel sounds present  NEURO: AAOx3, no focal weakness, 5/5 b/l extremity strength, b/l knee no arthritis, no effusion   EXTREMITIES:  LLE dsg c/d/i  SKIN: No rashes or lesions

## 2024-04-12 NOTE — PROGRESS NOTE ADULT - ASSESSMENT
Patient is a 59y Male active smoker with a hx of Stage IV rectal adenocarcinoma s/p colectomy with colostomy (1/2024) and T2DM c/b peripheral neuropathy who presented from PCP concerning for L foot infection, found to have L fifth metatarsal osteomyelitis. OR with podiatry pending vascular surgery. LLE angiogram with possible stent scheduled for 4/8. L foot 5th metatarsal head resection with graft application scheduled for 4/10.    PLANS:    # Osteomyelitis:   - In setting of diabetic foot wound. MRI demonstrating early osteomyelitis of L foot 5th metatarsal head and base of proximal phalanx.  - DAMIEN demonstrating LLE PAD  - BCx: NGTD  - Wound Cx: Klebsiella   - Podiatry following: sp OR for L foot 5th metatarsal head resection with graft application 4/10, return to OR today for left foot partial 5th ray resection with wound debridement and Kerecis graft application with Dr. Call  - Vascular surgery following: LLE angiogram balloon angioplasty and stent to SFA on 4/8  - ID following: switch to Zosyn  - WBC elevated today will CTM and appreciate id recs; afebrile.    # Rectal adenocarcinoma:  - Stage IV Rectal Adenocarcinoma with metastasis to liver diagnosed 08/2023. Follows with Dr. Renny Purvis.  - Oncology following  - Chemotherapy held in setting of infection  - Follow-up outpatient.    # DM2:  - HgbA1c 6.8%  - Continue to monitor blood glucose levels  - Sliding Scale    # DVT ppx:  - IPCs    [facility]  [phone]

## 2024-04-12 NOTE — BRIEF OPERATIVE NOTE - SPECIMENS
Pathology 1) 5th metatarsal head 2) 5th proximal phalanx margin 3) Necrotic wound; Culture 1) OR deep wound culture 2) 5th proximal phalanx margin 
Microbiology: 1) Left foot 5th metatarsal. Pathology: 1)Left foot 5th metatarsal.

## 2024-04-12 NOTE — BRIEF OPERATIVE NOTE - NSICDXBRIEFPROCEDURE_GEN_ALL_CORE_FT
PROCEDURES:  Incision and drainage, bone abscess or osteomyelitis, foot 10-Apr-2024 08:52:06  Adeline White  Debridement, wound, foot, with skin graft application 12-Apr-2024 15:33:18  Monique Damon  
PROCEDURES:  Incision and drainage, bone abscess or osteomyelitis, foot 10-Apr-2024 08:52:06  Adeline White amputation of toe 10-Apr-2024 08:52:14  Adeline White

## 2024-04-12 NOTE — PROGRESS NOTE ADULT - SUBJECTIVE AND OBJECTIVE BOX
DATE OF SERVICE: 04-12-24 @ 12:30    Patient is a 59y old  Male who presents with a chief complaint of Sent to the ER by PCP following concern for LEFT foot infection (12 Apr 2024 11:56)      INTERVAL HISTORY: Feels ok. Awaiting surgery.     REVIEW OF SYSTEMS:  CONSTITUTIONAL: No weakness  EYES/ENT: No visual changes;  No throat pain   NECK: No pain or stiffness  RESPIRATORY: No cough, wheezing; No shortness of breath  CARDIOVASCULAR: No chest pain or palpitations  GASTROINTESTINAL: No abdominal  pain. No nausea, vomiting, or hematemesis  GENITOURINARY: No dysuria, frequency or hematuria  NEUROLOGICAL: No stroke like symptoms  SKIN: No rashes      	  MEDICATIONS:        PHYSICAL EXAM:  T(C): 36.7 (04-12-24 @ 15:07), Max: 37.3 (04-11-24 @ 22:18)  HR: 67 (04-12-24 @ 15:35) (64 - 73)  BP: 118/66 (04-12-24 @ 15:35) (111/60 - 126/75)  RR: 16 (04-12-24 @ 15:35) (16 - 18)  SpO2: 99% (04-12-24 @ 15:35) (97% - 100%)  Wt(kg): --  I&O's Summary    11 Apr 2024 07:01  -  12 Apr 2024 07:00  --------------------------------------------------------  IN: 0 mL / OUT: 600 mL / NET: -600 mL          Appearance: In no distress	  HEENT:    PERRL, EOMI	  Cardiovascular:  S1 S2, No JVD  Respiratory: Lungs clear to auscultation	  Gastrointestinal:  Soft, Non-tender, + BS	  Vascularature:  No edema of LE  Psychiatric: Appropriate affect   Neuro: no acute focal deficits                               9.8    10.35 )-----------( 476      ( 12 Apr 2024 06:40 )             29.0     04-12    136  |  100  |  22  ----------------------------<  164<H>  4.7   |  23  |  1.11    Ca    9.2      12 Apr 2024 06:40  Phos  3.5     04-11  Mg     2.1     04-11          Labs personally reviewed      ASSESSMENT/PLAN: 	    This is a 59 year old male PMH: Colon ca, Tobacco dependance.  Admitted for left foot pain. + Ulcer      1. Preop Risk Stratification - TTE unremarkable with preserved EF  - EKG non ischemic   - Reports good functional capacity prior to diabetic foot ulcer  - Elevated risk for mod risk LE angio and pods surgery, no contraindication to proceed  - s/p peripheral angio with BMS placement.   - L foot 5th metatarsal head resection with graft application   - Tolerated surgery well.  - Return to OR for left foot partial fifth ray resection, wound debridement and graft application today 4/12 at 1pm with Dr. Call     2.   Diabetic foot ulcer  - MRI foot  suggestive of early osteomyelitis   - C/w local wound care per Pod  - Abx as per primary team  - POD following-> s/p left foot fifth metatarsal head resection, open 4/10  - Plan for left foot partial fifth ray resection, wound debridement and graft application today 4/12 at 1pm with Dr. Call     3.  Dm2:  - HgbA1c 6.8  - ISS as per primary team    4 DVT ppx:  - Lovenox sq- on hold for Sx        GALA Medina-NP   Nuno Yeager DO [facility]  Cardiovascular Medicine  [address] [place] [address], [address]  Available through call or text on [employer]  Office: [phone]

## 2024-04-13 LAB
ANION GAP SERPL CALC-SCNC: 13 MMOL/L — SIGNIFICANT CHANGE UP (ref 5–17)
BUN SERPL-MCNC: 26 MG/DL — HIGH (ref 7–23)
CALCIUM SERPL-MCNC: 9.6 MG/DL — SIGNIFICANT CHANGE UP (ref 8.4–10.5)
CHLORIDE SERPL-SCNC: 100 MMOL/L — SIGNIFICANT CHANGE UP (ref 96–108)
CO2 SERPL-SCNC: 24 MMOL/L — SIGNIFICANT CHANGE UP (ref 22–31)
CREAT SERPL-MCNC: 1.36 MG/DL — HIGH (ref 0.5–1.3)
EGFR: 60 ML/MIN/1.73M2 — SIGNIFICANT CHANGE UP
GLUCOSE SERPL-MCNC: 260 MG/DL — HIGH (ref 70–99)
GRAM STN FLD: SIGNIFICANT CHANGE UP
HCT VFR BLD CALC: 29.8 % — LOW (ref 39–50)
HGB BLD-MCNC: 9.8 G/DL — LOW (ref 13–17)
MAGNESIUM SERPL-MCNC: 2 MG/DL — SIGNIFICANT CHANGE UP (ref 1.6–2.6)
MCHC RBC-ENTMCNC: 27.5 PG — SIGNIFICANT CHANGE UP (ref 27–34)
MCHC RBC-ENTMCNC: 32.9 GM/DL — SIGNIFICANT CHANGE UP (ref 32–36)
MCV RBC AUTO: 83.7 FL — SIGNIFICANT CHANGE UP (ref 80–100)
NRBC # BLD: 0 /100 WBCS — SIGNIFICANT CHANGE UP (ref 0–0)
PHOSPHATE SERPL-MCNC: 3.5 MG/DL — SIGNIFICANT CHANGE UP (ref 2.5–4.5)
PLATELET # BLD AUTO: 515 K/UL — HIGH (ref 150–400)
POTASSIUM SERPL-MCNC: 4.8 MMOL/L — SIGNIFICANT CHANGE UP (ref 3.5–5.3)
POTASSIUM SERPL-SCNC: 4.8 MMOL/L — SIGNIFICANT CHANGE UP (ref 3.5–5.3)
RBC # BLD: 3.56 M/UL — LOW (ref 4.2–5.8)
RBC # FLD: 11.7 % — SIGNIFICANT CHANGE UP (ref 10.3–14.5)
SODIUM SERPL-SCNC: 137 MMOL/L — SIGNIFICANT CHANGE UP (ref 135–145)
SPECIMEN SOURCE: SIGNIFICANT CHANGE UP
WBC # BLD: 9.63 K/UL — SIGNIFICANT CHANGE UP (ref 3.8–10.5)
WBC # FLD AUTO: 9.63 K/UL — SIGNIFICANT CHANGE UP (ref 3.8–10.5)

## 2024-04-13 RX ORDER — DEXTROSE 50 % IN WATER 50 %
25 SYRINGE (ML) INTRAVENOUS ONCE
Refills: 0 | Status: DISCONTINUED | OUTPATIENT
Start: 2024-04-13 | End: 2024-04-16

## 2024-04-13 RX ORDER — DEXTROSE 50 % IN WATER 50 %
12.5 SYRINGE (ML) INTRAVENOUS ONCE
Refills: 0 | Status: DISCONTINUED | OUTPATIENT
Start: 2024-04-13 | End: 2024-04-16

## 2024-04-13 RX ORDER — DEXTROSE 50 % IN WATER 50 %
15 SYRINGE (ML) INTRAVENOUS ONCE
Refills: 0 | Status: DISCONTINUED | OUTPATIENT
Start: 2024-04-13 | End: 2024-04-16

## 2024-04-13 RX ORDER — INSULIN LISPRO 100/ML
VIAL (ML) SUBCUTANEOUS
Refills: 0 | Status: DISCONTINUED | OUTPATIENT
Start: 2024-04-13 | End: 2024-04-16

## 2024-04-13 RX ORDER — SODIUM CHLORIDE 9 MG/ML
1000 INJECTION, SOLUTION INTRAVENOUS
Refills: 0 | Status: DISCONTINUED | OUTPATIENT
Start: 2024-04-13 | End: 2024-04-16

## 2024-04-13 RX ORDER — DEXTROSE 10 % IN WATER 10 %
125 INTRAVENOUS SOLUTION INTRAVENOUS ONCE
Refills: 0 | Status: DISCONTINUED | OUTPATIENT
Start: 2024-04-13 | End: 2024-04-16

## 2024-04-13 RX ORDER — INSULIN LISPRO 100/ML
VIAL (ML) SUBCUTANEOUS AT BEDTIME
Refills: 0 | Status: DISCONTINUED | OUTPATIENT
Start: 2024-04-13 | End: 2024-04-16

## 2024-04-13 RX ORDER — GLUCAGON INJECTION, SOLUTION 0.5 MG/.1ML
1 INJECTION, SOLUTION SUBCUTANEOUS ONCE
Refills: 0 | Status: DISCONTINUED | OUTPATIENT
Start: 2024-04-13 | End: 2024-04-16

## 2024-04-13 RX ADMIN — ERTAPENEM SODIUM 120 MILLIGRAM(S): 1 INJECTION, POWDER, LYOPHILIZED, FOR SOLUTION INTRAMUSCULAR; INTRAVENOUS at 14:58

## 2024-04-13 RX ADMIN — PIPERACILLIN AND TAZOBACTAM 25 GRAM(S): 4; .5 INJECTION, POWDER, LYOPHILIZED, FOR SOLUTION INTRAVENOUS at 07:56

## 2024-04-13 RX ADMIN — Medication 81 MILLIGRAM(S): at 12:09

## 2024-04-13 NOTE — PROGRESS NOTE ADULT - ASSESSMENT
59y old Male with history of T2DM not on meds, Colon cancer s/p colectomy and colostomy and peripheral neuropathy here with acute osteomyelitis of left metatarsal s/p resection on 4/10.     1. T2DM with hyperglycemia  - FS high at bedtime last night  - He has agreed to outpatient follow up and treatment if his FS don't improve at home in 1-2 weeks  - Monitor FS as the patient allows    2. Left 5th metatarsal osteomyelitis s/p amputation  - podiatry following, s/p graft placement  - on Zosyn, ID following    Will continue to follow.    Catracho Mooney MD  [facility]- Division of Endocrinology    [address] [place] [address]    T [phone]  F [phone]

## 2024-04-13 NOTE — PROGRESS NOTE ADULT - PROBLEM SELECTOR PLAN 1
In setting of diabetic foot wound. MRI demonstrating early osteomyelitis of L foot 5th metatarsal head and base of proximal phalanx.  - DAMIEN demonstrating LLE PAD  - BCx: NGTD  - Wound Cx: Klebsiella   - Podiatry following: OR for L foot 5th metatarsal head resection with graft application 4/10 pending vascular  - Vascular surgery following: LLE angiogram with possible stent 4/8  - ID following: switch to Zosyn  - WBC elevated today will CTM and appreciate id recs; afebrile In setting of diabetic foot wound. MRI demonstrating early osteomyelitis of L foot 5th metatarsal head and base of proximal phalanx.  - DAMIEN demonstrating LLE PAD  - BCx: NGTD  - Wound Cx: Klebsiella   - Podiatry following: OR for L foot 5th metatarsal head resection with graft application 4/10 and 4/12  - Vascular surgery following: s/p LLE angiogram  - ID following: switch to Zosyn  - WBC elevated today will CTM and appreciate id recs; afebrile

## 2024-04-13 NOTE — PROGRESS NOTE ADULT - SUBJECTIVE AND OBJECTIVE BOX
Podiatry pager #: [phone] ([place])/ [phone] ([facility])    Patient is a 59y old  Male who presents with a chief complaint of Sent to the ER by PCP following concern for LEFT foot infection (13 Apr 2024 06:39)       INTERVAL HPI/OVERNIGHT EVENTS:  Patient seen and evaluated at bedside.  Pt is resting comfortable in NAD. Denies N/V/F/C.     Allergies    No Known Allergies    Intolerances        Vital Signs Last 24 Hrs  T(C): 36.8 (13 Apr 2024 04:30), Max: 36.8 (13 Apr 2024 04:30)  T(F): 98.3 (13 Apr 2024 04:30), Max: 98.3 (13 Apr 2024 04:30)  HR: 71 (13 Apr 2024 04:30) (64 - 73)  BP: 133/78 (13 Apr 2024 04:30) (111/60 - 133/78)  BP(mean): 84 (12 Apr 2024 16:30) (75 - 88)  RR: 18 (13 Apr 2024 04:30) (14 - 18)  SpO2: 98% (13 Apr 2024 04:30) (97% - 100%)    Parameters below as of 13 Apr 2024 04:30  Patient On (Oxygen Delivery Method): room air        LABS:                        9.8    9.63  )-----------( 515      ( 13 Apr 2024 07:05 )             29.8     04-13    137  |  100  |  26<H>  ----------------------------<  260<H>  4.8   |  24  |  1.36<H>    Ca    9.6      13 Apr 2024 07:05  Phos  3.5     04-13  Mg     2.0     04-13      PT/INR - ( 12 Apr 2024 06:40 )   PT: 12.1 sec;   INR: 1.10 ratio         PTT - ( 12 Apr 2024 06:40 )  PTT:32.4 sec  Urinalysis Basic - ( 13 Apr 2024 07:05 )    Color: x / Appearance: x / SG: x / pH: x  Gluc: 260 mg/dL / Ketone: x  / Bili: x / Urobili: x   Blood: x / Protein: x / Nitrite: x   Leuk Esterase: x / RBC: x / WBC x   Sq Epi: x / Non Sq Epi: x / Bacteria: x      CAPILLARY BLOOD GLUCOSE      POCT Blood Glucose.: 308 mg/dL (12 Apr 2024 22:03)  POCT Blood Glucose.: 122 mg/dL (12 Apr 2024 15:18)      Lower Extremity Physical Exam:  Vascular: DP/PT 2/4, B/L, CFT <3  seconds B/L, Temperature gradient warm to cool B/L.   Neuro: Epicritic sensation diminished to the level of digits, B/L.  Musculoskeletal/Ortho: unremarkable   Skin: 4/12 s/p left foot wound debridement w kerecis micro graft: staples/adaptic and graft intact, no drainage, 4th toe slightly dusky but warm to touch.    RADIOLOGY & ADDITIONAL TESTS:   Podiatry pager #: [phone] ([place])/ [phone] ([facility])    Patient is a 59y old  Male who presents with a chief complaint of Sent to the ER by PCP following concern for LEFT foot infection (13 Apr 2024 06:39)       INTERVAL HPI/OVERNIGHT EVENTS:  Patient seen and evaluated at bedside.  Pt is resting comfortable in NAD. Denies N/V/F/C.     Allergies    No Known Allergies    Intolerances        Vital Signs Last 24 Hrs  T(C): 36.8 (13 Apr 2024 04:30), Max: 36.8 (13 Apr 2024 04:30)  T(F): 98.3 (13 Apr 2024 04:30), Max: 98.3 (13 Apr 2024 04:30)  HR: 71 (13 Apr 2024 04:30) (64 - 73)  BP: 133/78 (13 Apr 2024 04:30) (111/60 - 133/78)  BP(mean): 84 (12 Apr 2024 16:30) (75 - 88)  RR: 18 (13 Apr 2024 04:30) (14 - 18)  SpO2: 98% (13 Apr 2024 04:30) (97% - 100%)    Parameters below as of 13 Apr 2024 04:30  Patient On (Oxygen Delivery Method): room air        LABS:                        9.8    9.63  )-----------( 515      ( 13 Apr 2024 07:05 )             29.8     04-13    137  |  100  |  26<H>  ----------------------------<  260<H>  4.8   |  24  |  1.36<H>    Ca    9.6      13 Apr 2024 07:05  Phos  3.5     04-13  Mg     2.0     04-13      PT/INR - ( 12 Apr 2024 06:40 )   PT: 12.1 sec;   INR: 1.10 ratio         PTT - ( 12 Apr 2024 06:40 )  PTT:32.4 sec  Urinalysis Basic - ( 13 Apr 2024 07:05 )    Color: x / Appearance: x / SG: x / pH: x  Gluc: 260 mg/dL / Ketone: x  / Bili: x / Urobili: x   Blood: x / Protein: x / Nitrite: x   Leuk Esterase: x / RBC: x / WBC x   Sq Epi: x / Non Sq Epi: x / Bacteria: x      CAPILLARY BLOOD GLUCOSE      POCT Blood Glucose.: 308 mg/dL (12 Apr 2024 22:03)  POCT Blood Glucose.: 122 mg/dL (12 Apr 2024 15:18)      Lower Extremity Physical Exam:  Vascular: DP/PT 2/4, B/L, CFT <3  seconds B/L, Temperature gradient warm to cool B/L.   Neuro: Epicritic sensation diminished to the level of digits, B/L.  Musculoskeletal/Ortho: unremarkable   Skin: 4/12 s/p left foot wound debridement w kerecis micro graft: staples/adaptic and graft intact, no drainage, 5th toe slightly dusky but warm to touch.    RADIOLOGY & ADDITIONAL TESTS:

## 2024-04-13 NOTE — PROGRESS NOTE ADULT - SUBJECTIVE AND OBJECTIVE BOX
[facility] [place] HEMATOLOGY/ONCOLOGY INPATIENT PROGRESS NOTE     Interval Hx:   04-13-24: Mr. Menon was seen at bedside today.    Meds:   MEDICATIONS  (STANDING):  aspirin  chewable 81 milliGRAM(s) Oral daily  ertapenem  IVPB 1000 milliGRAM(s) IV Intermittent every 24 hours  influenza   Vaccine 0.5 milliLiter(s) IntraMuscular once  piperacillin/tazobactam IVPB.. 3.375 Gram(s) IV Intermittent every 8 hours    MEDICATIONS  (PRN):  acetaminophen     Tablet .. 650 milliGRAM(s) Oral every 6 hours PRN Mild Pain (1 - 3)  HYDROmorphone  Injectable 0.5 milliGRAM(s) IV Push every 4 hours PRN Severe Pain (7 - 10)  oxycodone    5 mG/acetaminophen 325 mG 1 Tablet(s) Oral every 4 hours PRN Moderate Pain (4 - 6)    Vital Signs Last 24 Hrs  T(C): 36.8 (13 Apr 2024 04:30), Max: 36.8 (13 Apr 2024 04:30)  T(F): 98.3 (13 Apr 2024 04:30), Max: 98.3 (13 Apr 2024 04:30)  HR: 71 (13 Apr 2024 04:30) (64 - 73)  BP: 133/78 (13 Apr 2024 04:30) (111/60 - 133/78)  BP(mean): 84 (12 Apr 2024 16:30) (75 - 88)  RR: 18 (13 Apr 2024 04:30) (14 - 18)  SpO2: 98% (13 Apr 2024 04:30) (97% - 100%)    Parameters below as of 13 Apr 2024 04:30  Patient On (Oxygen Delivery Method): room air    Physical Exam:  Gen: NAD  HEENT: EOMI, MMM  Chest: equal chest rise, speaking full sentences   Cardiac: RR  Abd: Nondistended  Ext: LLE/foot cellulitis now surgical  Neuro: AAOx3, normal mood and affect     Labs:                        9.8    10.35 )-----------( 476      ( 12 Apr 2024 06:40 )             29.0     CBC Full  -  ( 12 Apr 2024 06:40 )  WBC Count : 10.35 K/uL  RBC Count : 3.50 M/uL  Hemoglobin : 9.8 g/dL  Hematocrit : 29.0 %  Platelet Count - Automated : 476 K/uL  Mean Cell Volume : 82.9 fl  Mean Cell Hemoglobin : 28.0 pg  Mean Cell Hemoglobin Concentration : 33.8 gm/dL    04-12    136  |  100  |  22  ----------------------------<  164<H>  4.7   |  23  |  1.11    Ca    9.2      12 Apr 2024 06:40  Phos  3.5     04-11  Mg     2.1     04-11      PT/INR - ( 12 Apr 2024 06:40 )   PT: 12.1 sec;   INR: 1.10 ratio         PTT - ( 12 Apr 2024 06:40 )  PTT:32.4 sec   [facility] HEMATOLOGY/ONCOLOGY INPATIENT PROGRESS NOTE     Interval Hx:   04-13-24: Mr. Menon was seen at bedside today, stated he feels well, tolerated return to OR well 04/12/2024 with podiatry, concern for ongoing viability given prolonged capillary refill time     Meds:   MEDICATIONS  (STANDING):  aspirin  chewable 81 milliGRAM(s) Oral daily  ertapenem  IVPB 1000 milliGRAM(s) IV Intermittent every 24 hours  influenza   Vaccine 0.5 milliLiter(s) IntraMuscular once  piperacillin/tazobactam IVPB.. 3.375 Gram(s) IV Intermittent every 8 hours    MEDICATIONS  (PRN):  acetaminophen     Tablet .. 650 milliGRAM(s) Oral every 6 hours PRN Mild Pain (1 - 3)  HYDROmorphone  Injectable 0.5 milliGRAM(s) IV Push every 4 hours PRN Severe Pain (7 - 10)  oxycodone    5 mG/acetaminophen 325 mG 1 Tablet(s) Oral every 4 hours PRN Moderate Pain (4 - 6)    Vital Signs Last 24 Hrs  T(C): 36.8 (13 Apr 2024 04:30), Max: 36.8 (13 Apr 2024 04:30)  T(F): 98.3 (13 Apr 2024 04:30), Max: 98.3 (13 Apr 2024 04:30)  HR: 71 (13 Apr 2024 04:30) (64 - 73)  BP: 133/78 (13 Apr 2024 04:30) (111/60 - 133/78)  BP(mean): 84 (12 Apr 2024 16:30) (75 - 88)  RR: 18 (13 Apr 2024 04:30) (14 - 18)  SpO2: 98% (13 Apr 2024 04:30) (97% - 100%)    Parameters below as of 13 Apr 2024 04:30  Patient On (Oxygen Delivery Method): room air    Physical Exam:  Gen: NAD  HEENT: EOMI, MMM  Chest: equal chest rise, speaking full sentences   Cardiac: RR  Abd: Nondistended  Ext: LLE/foot cellulitis now surgical  Neuro: AAOx3, normal mood and affect     Labs:                        9.8    10.35 )-----------( 476      ( 12 Apr 2024 06:40 )             29.0     CBC Full  -  ( 12 Apr 2024 06:40 )  WBC Count : 10.35 K/uL  RBC Count : 3.50 M/uL  Hemoglobin : 9.8 g/dL  Hematocrit : 29.0 %  Platelet Count - Automated : 476 K/uL  Mean Cell Volume : 82.9 fl  Mean Cell Hemoglobin : 28.0 pg  Mean Cell Hemoglobin Concentration : 33.8 gm/dL    04-12    136  |  100  |  22  ----------------------------<  164<H>  4.7   |  23  |  1.11    Ca    9.2      12 Apr 2024 06:40  Phos  3.5     04-11  Mg     2.1     04-11      PT/INR - ( 12 Apr 2024 06:40 )   PT: 12.1 sec;   INR: 1.10 ratio         PTT - ( 12 Apr 2024 06:40 )  PTT:32.4 sec

## 2024-04-13 NOTE — PROGRESS NOTE ADULT - ASSESSMENT
Mr. Menon is a 59 year old male active smoker with PMHx of  peripheral neuropathy, Type II DM,  anemia,  status post colectomy with colostomy 01/2024  due to rectal cancer under the care of Dr. Renny Purvis now admitted with osteomyelitis of the left foot.  MRI foot shows Findings suggestive of early osteomyelitis at the fifth metatarsal head and base of the proximal phalanx, deep to the soft tissue wound. Focal osteitis is also a differential consideration for the osseous findings. Podiatry pending resection with graft. Infectious disease consulted.    Labs show hemoglobin 11.6, hematocrit  34.7, MCV 83.6 platelet count 339, WBC 12.14 with neutrophilic predominance. Creatinine 1.12 with normal liver function.  HbA1c 6.8.  TTE with EF 69%.     Patient last seen in Hematology Oncology Clinic 04/02/2024  and oxaliplatin was discontinued due to peripheral neuropathy. Podiatry scheduled pt for left foot fifth metatarsal head resection with wound debridement and graft application 04/04/2024, DAMIEN concerning, vascular performed LLE angio, balloon angioplasty and stent to SFA 04/08/2024.     04/11/2024: s/p 5th metatarsal head and neck resection with podiatry, pending return to OR with podiatry today, noted Endo recs, counseled pt on need for glycemic control including for acute wound healing, but also chronically for optimal medical optimization given on going need for antineoplastic therapy, he was frustrated and would like to re-visit topic at later date, stated he believes insulin makes his blood sugars higher, delineated mechanism of therapy but continued frustration, will continue to  as appropriate.       # Stage IV Rectal Adenocarcinoma   - Diagnosed 08/2023  - Invasive, moderately differentiated colonic adenocarcinoma  - Low probability of MSI-high  - HER-2-Mary Negative  - KRAS is Wild Type  - Metastatic to liver  - FOLFOX and weekly Cetuximab started 9/18/23  - PET-CT from 03/20/2024 shows that the patient is status post surgery with no abnormal FDG uptake in the pelvis postsurgical area.  More prominent uptake in the gallbladder fossa (early recurrence versus normal fluctuation of uptake ) and no other FDG avid lesion in the field-of-view.  Previously noted liver Mets are not presently seen.  - Was scheduled to receive 5FU, Leucovorin and Cetuximab 04/02/2024, therapy held due to concern for L foot infection and pt sent to Podiatry   - Plan to continue therapy once able to from infection standpoint, pt is aware     # Left Foot Osteomyelitis  # PVD  - Vascular surgery DAMIEN suggestive of moderate arterial flow limitation in the left lower extremity localizing to the iliofemoral distribution  --DAMIEN LLE with 0.65 indicating moderate lower extremity arterial vascular disease  - LLE angio, balloon angioplasty and stent to SFA 04/08/2024]  - Podiatry s/p left foot fifth metatarsal head/neck resection with wound debridement and graft application, 04/11/2024   - Return to OR pending 04/12/2024   - Antibiotics per ID and primary team     # Neutrophilia   - Due to infection  - Outpatient labs 04/02/2024 WBC 16.19 ANC 14,170  - Continue to monitor, improving     # Normocytic anemia  - Likely multifactorial, active infection, cancer, chemotherapy, dilutional   - Outpatient labs 04/02/2024 Hb 13.7, MCV 83.5   - Continue to monitor  - Transfuse to maintain Hb > 7     # Nicotine dependence, active cigarette smoker  - Counseling provided, pt aware of possible compromised wound healing     # Type II DM  - HBA1c 6.8  - inpatient Hyperglycemia noted  - Endocrinology following  - Counseling provided       Thank you for allowing me to participate in the care of Mr. Menon, please do not hesitate to call or text me if you have further questions or concerns.     Gato Horvath MD  [facility]   Division of Hematology/Oncology  [address] [place] [address], [address] [place] [address]  P: [phone]  F: [phone]    Attestation:    ----Pt evaluated including face-to-face interaction in addition to chart review, reviewing treatment plan, and managing the patient’s chronic diagnoses as listed in the assessment---- Mr. Menon is a 59 year old male active smoker with PMHx of  peripheral neuropathy, Type II DM,  anemia,  status post colectomy with colostomy 01/2024  due to rectal cancer under the care of Dr. Renny Purvis now admitted with osteomyelitis of the left foot.  MRI foot shows Findings suggestive of early osteomyelitis at the fifth metatarsal head and base of the proximal phalanx, deep to the soft tissue wound. Focal osteitis is also a differential consideration for the osseous findings. Podiatry pending resection with graft. Infectious disease consulted.    Labs show hemoglobin 11.6, hematocrit  34.7, MCV 83.6 platelet count 339, WBC 12.14 with neutrophilic predominance. Creatinine 1.12 with normal liver function.  HbA1c 6.8.  TTE with EF 69%.     Patient last seen in Hematology Oncology Clinic 04/02/2024  and oxaliplatin was discontinued due to peripheral neuropathy. Podiatry scheduled pt for left foot fifth metatarsal head resection with wound debridement and graft application 04/04/2024, DAMIEN concerning, vascular performed LLE angio, balloon angioplasty and stent to SFA 04/08/2024.     04/11/2024: s/p 5th metatarsal head and neck resection with podiatry, pending return to OR with podiatry today, noted Endo recs, counseled pt on need for glycemic control including for acute wound healing, but also chronically for optimal medical optimization given on going need for antineoplastic therapy, he was frustrated and would like to re-visit topic at later date, stated he believes insulin makes his blood sugars higher, delineated mechanism of therapy but continued frustration, will continue to  as appropriate.     04/12/2024:  tolerated return to OR well 04/12/2024 with podiatry, concern for ongoing viability given prolonged capillary refill time     # Stage IV Rectal Adenocarcinoma   - Diagnosed 08/2023  - Invasive, moderately differentiated colonic adenocarcinoma  - Low probability of MSI-high  - HER-2-Mary Negative  - KRAS is Wild Type  - Metastatic to liver  - FOLFOX and weekly Cetuximab started 9/18/23  - PET-CT from 03/20/2024 shows that the patient is status post surgery with no abnormal FDG uptake in the pelvis postsurgical area.  More prominent uptake in the gallbladder fossa (early recurrence versus normal fluctuation of uptake ) and no other FDG avid lesion in the field-of-view.  Previously noted liver Mets are not presently seen.  - Was scheduled to receive 5FU, Leucovorin and Cetuximab 04/02/2024, therapy held due to concern for L foot infection and pt sent to Podiatry   - Plan to continue therapy once able to from infection standpoint, pt is aware     # Left Foot Osteomyelitis  # PVD  - Vascular surgery DAMIEN suggestive of moderate arterial flow limitation in the left lower extremity localizing to the iliofemoral distribution  --DAMIEN LLE with 0.65 indicating moderate lower extremity arterial vascular disease  - LLE angio, balloon angioplasty and stent to SFA 04/08/2024]  - Podiatry s/p left foot fifth metatarsal head/neck resection with wound debridement and graft application, 04/11/2024   -  tolerated return to OR well 04/12/2024 with podiatry, concern for ongoing viability given prolonged capillary refill time   - Antibiotics per ID and primary team     # Neutrophilia   - Due to infection  - Outpatient labs 04/02/2024 WBC 16.19 ANC 14,170  - Continue to monitor, improving     # Normocytic anemia  - Likely multifactorial, active infection, cancer, chemotherapy  - Outpatient labs 04/02/2024 Hb 13.7, MCV 83.5   - Continue to monitor  - Transfuse to maintain Hb > 7     # Nicotine dependence, active cigarette smoker  - Counseling provided, pt aware of possible compromised wound healing     # Type II DM  - HBA1c 6.8  - inpatient Hyperglycemia noted  - Endocrinology following  - Counseling provided daily       Thank you for allowing me to participate in the care of Mr. Menon, please do not hesitate to call or text me if you have further questions or concerns.     Gato Horvath MD  [facility]   Division of Hematology/Oncology  [address] [place] [address], [address]  [place] [address]  P: [phone]  F: [phone]    Attestation:    ----Pt evaluated including face-to-face interaction in addition to chart review, reviewing treatment plan, and managing the patient’s chronic diagnoses as listed in the assessment----

## 2024-04-13 NOTE — PROGRESS NOTE ADULT - SUBJECTIVE AND OBJECTIVE BOX
***************************************************************  Tanner Pimentel, PGY2  Internal Medicine   TEAMS Preferred  ***************************************************************    EDA CHAWLA  59y  MRN: [id number]  04-02-24 (11d)    Patient is a 59y old  Male who presents with a chief complaint of Sent to the ER by PCP following concern for LEFT foot infection (13 Apr 2024 06:34)      SUBJECTIVE / OVERNIGHT EVENTS:   No acute overnight events. Pt seen and examined at bedside. Denies fevers, chills, CP, SOB, Abdominal pain, N/V, Constipation, Diarrhea. Last BM     12 Point ROS negative with the exception of the above    MEDICATIONS  (STANDING):  aspirin  chewable 81 milliGRAM(s) Oral daily  ertapenem  IVPB 1000 milliGRAM(s) IV Intermittent every 24 hours  influenza   Vaccine 0.5 milliLiter(s) IntraMuscular once  piperacillin/tazobactam IVPB.. 3.375 Gram(s) IV Intermittent every 8 hours    MEDICATIONS  (PRN):  acetaminophen     Tablet .. 650 milliGRAM(s) Oral every 6 hours PRN Mild Pain (1 - 3)  HYDROmorphone  Injectable 0.5 milliGRAM(s) IV Push every 4 hours PRN Severe Pain (7 - 10)  oxycodone    5 mG/acetaminophen 325 mG 1 Tablet(s) Oral every 4 hours PRN Moderate Pain (4 - 6)      OBJECTIVE:  Vital Signs Last 24 Hrs  T(C): 36.8 (13 Apr 2024 04:30), Max: 36.8 (13 Apr 2024 04:30)  T(F): 98.3 (13 Apr 2024 04:30), Max: 98.3 (13 Apr 2024 04:30)  HR: 71 (13 Apr 2024 04:30) (64 - 73)  BP: 133/78 (13 Apr 2024 04:30) (111/60 - 133/78)  BP(mean): 84 (12 Apr 2024 16:30) (75 - 88)  RR: 18 (13 Apr 2024 04:30) (14 - 18)  SpO2: 98% (13 Apr 2024 04:30) (97% - 100%)    Parameters below as of 13 Apr 2024 04:30  Patient On (Oxygen Delivery Method): room air        I&O's Summary    11 Apr 2024 07:01  -  12 Apr 2024 07:00  --------------------------------------------------------  IN: 0 mL / OUT: 600 mL / NET: -600 mL    12 Apr 2024 07:01  -  13 Apr 2024 06:40  --------------------------------------------------------  IN: 240 mL / OUT: 220 mL / NET: 20 mL        PHYSICAL EXAM:  GENERAL: Laying comfortably, NAD  HEENT: NCAT, PERRLA, EOMI, no scleral icterus, no LAD  NECK: No JVD, supple  LUNG: CTABL; No wheezes, crackles, or rhonchi  HEART: RRR; normal S1/S2; No murmurs, rubs, or gallops  ABDOMEN: +BS, soft, nontender, nondistended, no HSM; No rebound, guarding, or rigidity  EXTREMITIES:  No LE edema b/l, 2+ Peripheral Pulses, No clubbing or cyanosis  NEUROLOGY: AOx3, non-focal, strength 5/5 in all extremities, sensation intact  PSYCH: calm and cooperative  SKIN: No rashes or lesions    LABS:                        9.8    10.35 )-----------( 476      ( 12 Apr 2024 06:40 )             29.0     Auto Eosinophil # x     / Auto Eosinophil % x     / Auto Neutrophil # x     / Auto Neutrophil % x     / BANDS % x                            10.2   11.90 )-----------( 476      ( 11 Apr 2024 07:42 )             31.3     Auto Eosinophil # 0.29  / Auto Eosinophil % 2.4   / Auto Neutrophil # 8.80  / Auto Neutrophil % 74.0  / BANDS % x        04-12    136  |  100  |  22  ----------------------------<  164<H>  4.7   |  23  |  1.11  04-11    136  |  101  |  19  ----------------------------<  181<H>  4.4   |  24  |  1.14    Ca    9.2      12 Apr 2024 06:40  Ca    9.4      11 Apr 2024 07:42  Phos  3.5     04-11  Mg     2.1     04-11      PT/INR - ( 12 Apr 2024 06:40 )   PT: 12.1 sec;   INR: 1.10 ratio         PTT - ( 12 Apr 2024 06:40 )  PTT:32.4 sec      Urinalysis Basic - ( 12 Apr 2024 06:40 )    Color: x / Appearance: x / SG: x / pH: x  Gluc: 164 mg/dL / Ketone: x  / Bili: x / Urobili: x   Blood: x / Protein: x / Nitrite: x   Leuk Esterase: x / RBC: x / WBC x   Sq Epi: x / Non Sq Epi: x / Bacteria: x          CAPILLARY BLOOD GLUCOSE      POCT Blood Glucose.: 308 mg/dL (12 Apr 2024 22:03)  POCT Blood Glucose.: 122 mg/dL (12 Apr 2024 15:18)        RADIOLOGY & ADDITIONAL TESTS:     ***************************************************************  Tanner Pimentel, PGY2  Internal Medicine   TEAMS Preferred  ***************************************************************    EDA CHAWLA  59y  MRN: [id number]  04-02-24 (11d)    Patient is a 59y old  Male who presents with a chief complaint of Sent to the ER by PCP following concern for LEFT foot infection (13 Apr 2024 06:34)      SUBJECTIVE / OVERNIGHT EVENTS:   S/p graft procedure 4/12.  Pt seen and examined at bedside. Denies fevers, chills, CP, SOB, Abdominal pain, N/V, Constipation, Diarrhea. Last BM yesterday    12 Point ROS negative with the exception of the above    MEDICATIONS  (STANDING):  aspirin  chewable 81 milliGRAM(s) Oral daily  ertapenem  IVPB 1000 milliGRAM(s) IV Intermittent every 24 hours  influenza   Vaccine 0.5 milliLiter(s) IntraMuscular once  piperacillin/tazobactam IVPB.. 3.375 Gram(s) IV Intermittent every 8 hours    MEDICATIONS  (PRN):  acetaminophen     Tablet .. 650 milliGRAM(s) Oral every 6 hours PRN Mild Pain (1 - 3)  HYDROmorphone  Injectable 0.5 milliGRAM(s) IV Push every 4 hours PRN Severe Pain (7 - 10)  oxycodone    5 mG/acetaminophen 325 mG 1 Tablet(s) Oral every 4 hours PRN Moderate Pain (4 - 6)      OBJECTIVE:  Vital Signs Last 24 Hrs  T(C): 36.8 (13 Apr 2024 04:30), Max: 36.8 (13 Apr 2024 04:30)  T(F): 98.3 (13 Apr 2024 04:30), Max: 98.3 (13 Apr 2024 04:30)  HR: 71 (13 Apr 2024 04:30) (64 - 73)  BP: 133/78 (13 Apr 2024 04:30) (111/60 - 133/78)  BP(mean): 84 (12 Apr 2024 16:30) (75 - 88)  RR: 18 (13 Apr 2024 04:30) (14 - 18)  SpO2: 98% (13 Apr 2024 04:30) (97% - 100%)    Parameters below as of 13 Apr 2024 04:30  Patient On (Oxygen Delivery Method): room air        I&O's Summary    11 Apr 2024 07:01  -  12 Apr 2024 07:00  --------------------------------------------------------  IN: 0 mL / OUT: 600 mL / NET: -600 mL    12 Apr 2024 07:01  -  13 Apr 2024 06:40  --------------------------------------------------------  IN: 240 mL / OUT: 220 mL / NET: 20 mL        PHYSICAL EXAM:  GENERAL: Laying comfortably, NAD  HEENT: NCAT, PERRLA, EOMI, no scleral icterus, no LAD  NECK: No JVD, supple  LUNG: CTABL; No wheezes, crackles, or rhonchi  HEART: RRR; normal S1/S2; No murmurs, rubs, or gallops  ABDOMEN: +BS, soft, nontender, nondistended, no HSM; No rebound, guarding, or rigidity  EXTREMITIES:  No LE edema b/l, 2+ Peripheral Pulses, No clubbing or cyanosis  NEUROLOGY: AOx3, non-focal, strength 5/5 in all extremities, sensation intact  PSYCH: calm and cooperative  SKIN:  L foot with dressing    LABS:                        9.8    9.63  )-----------( 515      ( 13 Apr 2024 07:05 )             29.8   04-13    137  |  100  |  26<H>  ----------------------------<  260<H>  4.8   |  24  |  1.36<H>    Ca    9.6      13 Apr 2024 07:05  Phos  3.5     04-13  Mg     2.0     04-13                            9.8    10.35 )-----------( 476      ( 12 Apr 2024 06:40 )             29.0     Auto Eosinophil # x     / Auto Eosinophil % x     / Auto Neutrophil # x     / Auto Neutrophil % x     / BANDS % x                            10.2   11.90 )-----------( 476      ( 11 Apr 2024 07:42 )             31.3     Auto Eosinophil # 0.29  / Auto Eosinophil % 2.4   / Auto Neutrophil # 8.80  / Auto Neutrophil % 74.0  / BANDS % x        04-12    136  |  100  |  22  ----------------------------<  164<H>  4.7   |  23  |  1.11  04-11    136  |  101  |  19  ----------------------------<  181<H>  4.4   |  24  |  1.14    Ca    9.2      12 Apr 2024 06:40  Ca    9.4      11 Apr 2024 07:42  Phos  3.5     04-11  Mg     2.1     04-11      PT/INR - ( 12 Apr 2024 06:40 )   PT: 12.1 sec;   INR: 1.10 ratio         PTT - ( 12 Apr 2024 06:40 )  PTT:32.4 sec      Urinalysis Basic - ( 12 Apr 2024 06:40 )    Color: x / Appearance: x / SG: x / pH: x  Gluc: 164 mg/dL / Ketone: x  / Bili: x / Urobili: x   Blood: x / Protein: x / Nitrite: x   Leuk Esterase: x / RBC: x / WBC x   Sq Epi: x / Non Sq Epi: x / Bacteria: x          CAPILLARY BLOOD GLUCOSE      POCT Blood Glucose.: 308 mg/dL (12 Apr 2024 22:03)  POCT Blood Glucose.: 122 mg/dL (12 Apr 2024 15:18)        RADIOLOGY & ADDITIONAL TESTS:

## 2024-04-13 NOTE — PROGRESS NOTE ADULT - SUBJECTIVE AND OBJECTIVE BOX
[facility] Progress Note    MAR, chart notes, fingersticks and labs reviewed    Subjective: graft placed yesterday, FS high at night    Objective  Vital Signs  T(C): 36.8 (04-13-24 @ 04:30), Max: 36.8 (04-13-24 @ 04:30)  HR: 71 (04-13-24 @ 04:30) (64 - 73)  BP: 133/78 (04-13-24 @ 04:30) (111/60 - 133/78)  RR: 18 (04-13-24 @ 04:30) (14 - 18)  SpO2: 98% (04-13-24 @ 04:30) (97% - 100%)    Physical Exam  Gen: NAD, resting comfortably  HEENT: NC/AT, EOMI  Neck: supple  Chest: breathing comfortably  Heart: +s1 +s2    Medications  acetaminophen     Tablet .. 650 milliGRAM(s) Oral every 6 hours PRN  aspirin  chewable 81 milliGRAM(s) Oral daily  ertapenem  IVPB 1000 milliGRAM(s) IV Intermittent every 24 hours  HYDROmorphone  Injectable 0.5 milliGRAM(s) IV Push every 4 hours PRN  influenza   Vaccine 0.5 milliLiter(s) IntraMuscular once  oxycodone    5 mG/acetaminophen 325 mG 1 Tablet(s) Oral every 4 hours PRN  piperacillin/tazobactam IVPB.. 3.375 Gram(s) IV Intermittent every 8 hours    Pertinent labs/Imaging  POCT Blood Glucose.: 308 mg/dL (04-12-24 @ 22:03)  POCT Blood Glucose.: 122 mg/dL (04-12-24 @ 15:18)    eGFR: 77 mL/min/1.73m2 (04-12-24 @ 06:40)  eGFR: 75 mL/min/1.73m2 (04-11-24 @ 07:42)

## 2024-04-13 NOTE — PROGRESS NOTE ADULT - SUBJECTIVE AND OBJECTIVE BOX
DATE OF SERVICE: 04-13-24 @ 15:41    Patient is a 59y old  Male who presents with a chief complaint of Sent to the ER by PCP following concern for LEFT foot infection (13 Apr 2024 10:00)      INTERVAL HISTORY: no complaints     REVIEW OF SYSTEMS:  CONSTITUTIONAL: No weakness  EYES/ENT: No visual changes;  No throat pain   NECK: No pain or stiffness  RESPIRATORY: No cough, wheezing; No shortness of breath  CARDIOVASCULAR: No chest pain or palpitations  GASTROINTESTINAL: No abdominal  pain. No nausea, vomiting, or hematemesis  GENITOURINARY: No dysuria, frequency or hematuria  NEUROLOGICAL: No stroke like symptoms  SKIN: No rashes    	  MEDICATIONS:        PHYSICAL EXAM:  T(C): 36.8 (04-13-24 @ 13:03), Max: 36.8 (04-13-24 @ 04:30)  HR: 78 (04-13-24 @ 13:03) (66 - 78)  BP: 117/75 (04-13-24 @ 13:03) (113/65 - 133/78)  RR: 16 (04-13-24 @ 13:03) (14 - 18)  SpO2: 98% (04-13-24 @ 13:03) (97% - 100%)  Wt(kg): --  I&O's Summary    12 Apr 2024 07:01  -  13 Apr 2024 07:00  --------------------------------------------------------  IN: 240 mL / OUT: 220 mL / NET: 20 mL    13 Apr 2024 07:01  -  13 Apr 2024 15:41  --------------------------------------------------------  IN: 200 mL / OUT: 275 mL / NET: -75 mL          Appearance: In no distress	  HEENT:    PERRL, EOMI	  Cardiovascular:  S1 S2, No JVD  Respiratory: Lungs clear to auscultation	  Gastrointestinal:  Soft, Non-tender, + BS	  Vascularature:  No edema of LE  Psychiatric: Appropriate affect   Neuro: no acute focal deficits                               9.8    9.63  )-----------( 515      ( 13 Apr 2024 07:05 )             29.8     04-13    137  |  100  |  26<H>  ----------------------------<  260<H>  4.8   |  24  |  1.36<H>    Ca    9.6      13 Apr 2024 07:05  Phos  3.5     04-13  Mg     2.0     04-13          Labs personally reviewed      ASSESSMENT/PLAN: 	    This is a 59 year old male PMH: Colon ca, Tobacco dependance.  Admitted for left foot pain. + Ulcer      1. Preop Risk Stratification - TTE unremarkable with preserved EF  - EKG non ischemic   - Reports good functional capacity prior to diabetic foot ulcer  - Elevated risk for mod risk LE angio and pods surgery, no contraindication to proceed  - s/p peripheral angio with BMS placement.   - L foot 5th metatarsal head resection with graft application   - Tolerated surgery well.  - Return to OR for left foot partial fifth ray resection, wound debridement and graft application today 4/12 at 1pm with Dr. Call   -  s/p left foot wound debridement w kerecis micro graft. Tolerated well.       2.   Diabetic foot ulcer  - MRI foot  suggestive of early osteomyelitis   - C/w local wound care per Pod  - Abx as per primary team  - POD following-> s/p left foot fifth metatarsal head resection, open 4/10  - Plan for left foot partial fifth ray resection, wound debridement and graft application today 4/12 at 1pm with Dr. Call   --  s/p left foot wound debridement w kerecis micro graft. Tolerated well.     3.  Dm2:  - HgbA1c 6.8  - ISS as per primary team    4 DVT ppx:  - Lovenox sq- on hold for Sx          GIANNI Medrano DO [facility]  Cardiovascular Medicine  [address] [place] [address], [address]  Office: [phone]  Available via call/text on [employer] Teams

## 2024-04-13 NOTE — PROGRESS NOTE ADULT - ASSESSMENT
59M  s/p left foot wound debridement with kerecis micro graft josette (DOS 4/12/24)  - Pt was seen and evaluated.   - Afebrile, no leukocytosis   - 4/12 s/p left foot wound debridement w kerecis micro graft: staples/adaptic and graft intact, no drainage, 4th toe slightly dusky but warm to touch.  -Vac ok to be applied today  - 4/12 Intra-op findings: low concern for residual infection, high concern for viability  - 4/12 OR data pending   - Stability for discharge id pending final ID and vasc recs  - Discussed with attending  59M  s/p left foot wound debridement with kerecis micro graft josette (DOS 4/12/24)  - Pt was seen and evaluated.   - Afebrile, no leukocytosis   - 4/12 s/p left foot wound debridement w kerecis micro graft: staples/adaptic and graft intact, no drainage, 5th toe slightly dusky but warm to touch.  -Vac ok to be applied today  - 4/12 Intra-op findings: low concern for residual infection, high concern for viability  - 4/12 OR data pending   - Stability for discharge id pending final ID and vasc recs  - Discussed with attending

## 2024-04-13 NOTE — PROGRESS NOTE ADULT - SUBJECTIVE AND OBJECTIVE BOX
SUBJECTIVE / OVERNIGHT EVENTS:      No events noted overnight. S/p OR yesterday      --------------------------------------------------------------------------------------------  LABS:                        9.8    10.35 )-----------( 476      ( 12 Apr 2024 06:40 )             29.0     04-12    136  |  100  |  22  ----------------------------<  164<H>  4.7   |  23  |  1.11    Ca    9.2      12 Apr 2024 06:40  Phos  3.5     04-11  Mg     2.1     04-11      PT/INR - ( 12 Apr 2024 06:40 )   PT: 12.1 sec;   INR: 1.10 ratio         PTT - ( 12 Apr 2024 06:40 )  PTT:32.4 sec  CAPILLARY BLOOD GLUCOSE      POCT Blood Glucose.: 308 mg/dL (12 Apr 2024 22:03)  POCT Blood Glucose.: 122 mg/dL (12 Apr 2024 15:18)        Urinalysis Basic - ( 12 Apr 2024 06:40 )    Color: x / Appearance: x / SG: x / pH: x  Gluc: 164 mg/dL / Ketone: x  / Bili: x / Urobili: x   Blood: x / Protein: x / Nitrite: x   Leuk Esterase: x / RBC: x / WBC x   Sq Epi: x / Non Sq Epi: x / Bacteria: x        RADIOLOGY & ADDITIONAL TESTS:    Imaging Personally Reviewed:  [x] YES  [ ] NO    Consultant(s) Notes Reviewed:  [x] YES  [ ] NO    MEDICATIONS  (STANDING):  aspirin  chewable 81 milliGRAM(s) Oral daily  ertapenem  IVPB 1000 milliGRAM(s) IV Intermittent every 24 hours  influenza   Vaccine 0.5 milliLiter(s) IntraMuscular once  piperacillin/tazobactam IVPB.. 3.375 Gram(s) IV Intermittent every 8 hours    MEDICATIONS  (PRN):  acetaminophen     Tablet .. 650 milliGRAM(s) Oral every 6 hours PRN Mild Pain (1 - 3)  HYDROmorphone  Injectable 0.5 milliGRAM(s) IV Push every 4 hours PRN Severe Pain (7 - 10)  oxycodone    5 mG/acetaminophen 325 mG 1 Tablet(s) Oral every 4 hours PRN Moderate Pain (4 - 6)      Care Discussed with Consultants/Other Providers [x] YES  [ ] NO    Vital Signs Last 24 Hrs  T(C): 36.8 (13 Apr 2024 04:30), Max: 36.8 (13 Apr 2024 04:30)  T(F): 98.3 (13 Apr 2024 04:30), Max: 98.3 (13 Apr 2024 04:30)  HR: 71 (13 Apr 2024 04:30) (64 - 73)  BP: 133/78 (13 Apr 2024 04:30) (111/60 - 133/78)  BP(mean): 84 (12 Apr 2024 16:30) (75 - 88)  RR: 18 (13 Apr 2024 04:30) (14 - 18)  SpO2: 98% (13 Apr 2024 04:30) (97% - 100%)    Parameters below as of 13 Apr 2024 04:30  Patient On (Oxygen Delivery Method): room air      I&O's Summary    11 Apr 2024 07:01  -  12 Apr 2024 07:00  --------------------------------------------------------  IN: 0 mL / OUT: 600 mL / NET: -600 mL    12 Apr 2024 07:01  -  13 Apr 2024 06:20  --------------------------------------------------------  IN: 240 mL / OUT: 220 mL / NET: 20 mL        PHYSICAL EXAM:  GENERAL: NAD, well-developed, comfortable  HEAD:  Atraumatic, Normocephalic  EYES: EOMI, PERRLA, conjunctiva and sclera clear  NECK: Supple, No JVD  CHEST/LUNG: Clear to auscultation bilaterally; No wheeze  HEART: Regular rate and rhythm; No murmurs, rubs, or gallops  ABDOMEN: Soft, Nontender, Nondistended; Bowel sounds present  NEURO: AAOx3, no focal weakness, 5/5 b/l extremity strength, b/l knee no arthritis, no effusion   EXTREMITIES:  LLE dsg c/d/i  SKIN: No rashes or lesions

## 2024-04-13 NOTE — PROGRESS NOTE ADULT - ASSESSMENT
Patient is a 59y Male active smoker with a hx of Stage IV rectal adenocarcinoma s/p colectomy with colostomy (1/2024) and T2DM c/b peripheral neuropathy who presented from PCP concerning for L foot infection, found to have L fifth metatarsal osteomyelitis. OR with podiatry pending vascular surgery. LLE angiogram with possible stent scheduled for 4/8. L foot 5th metatarsal head resection with graft application scheduled for 4/10.    PLANS:    # Osteomyelitis:   - In setting of diabetic foot wound. MRI demonstrating early osteomyelitis of L foot 5th metatarsal head and base of proximal phalanx.  - DAMIEN demonstrating LLE PAD  - BCx: NGTD  - Wound Cx: Klebsiella   - Podiatry following: sp OR for L foot 5th metatarsal head resection with graft application 4/10, S/p RTO 4/12 for left foot partial 5th ray resection with wound debridement and Kerecis graft application with Dr. Call  - Vascular surgery following: LLE angiogram balloon angioplasty and stent to SFA on 4/8  - ID following: C/w IV abx  - PT eval pending    # Rectal adenocarcinoma:  - Stage IV Rectal Adenocarcinoma with metastasis to liver diagnosed 08/2023. Follows with Dr. Renny Purvis.  - Oncology following  - Chemotherapy held in setting of infection  - Follow-up outpatient.    # DM2:  - HgbA1c 6.8%  - Continue to monitor blood glucose levels  - Sliding Scale    # DVT ppx:  - IPCs    [facility]  [phone]

## 2024-04-13 NOTE — PROGRESS NOTE ADULT - ASSESSMENT
Patient is a 59y Male active smoker with a hx of Stage IV rectal adenocarcinoma s/p colectomy with colostomy (1/2024) and T2DM c/b peripheral neuropathy who presented from PCP concerning for L foot infection, found to have L fifth metatarsal osteomyelitis. OR with podiatry pending vascular surgery. LLE angiogram with possible stent scheduled for 4/8. L foot 5th metatarsal head resection with graft application scheduled for 4/10.    PLANS:    # Osteomyelitis:   - In setting of diabetic foot wound. MRI demonstrating early osteomyelitis of L foot 5th metatarsal head and base of proximal phalanx.  - DAMIEN demonstrating LLE PAD  - BCx: NGTD  - Wound Cx: Klebsiella   - Podiatry following: sp OR for L foot 5th metatarsal head resection with graft application 4/10, S/p RTO 4/12 for left foot partial 5th ray resection with wound debridement and Kerecis graft application with Dr. Call  - Vascular surgery following: LLE angiogram balloon angioplasty and stent to SFA on 4/8  - ID following: C/w IV abx  - PT eval pending    # Rectal adenocarcinoma:  - Stage IV Rectal Adenocarcinoma with metastasis to liver diagnosed 08/2023. Follows with Dr. Renny Purvis.  - Oncology following  - Chemotherapy held in setting of infection  - Follow-up outpatient.    # DM2:  - HgbA1c 6.8%  - Continue to monitor blood glucose levels  - Sliding Scale    # DVT ppx:  - IPCs    [facility]  [phone]   EDA CHAWLA is a 59y Male active smoker with a hx of Stage IV rectal adenocarcinoma s/p colectomy with colostomy (1/2024) and T2DM c/b peripheral neuropathy who presented from PCP concerning for L foot infection, found to have L fifth metatarsal osteomyelitis.

## 2024-04-14 LAB
ALBUMIN SERPL ELPH-MCNC: 3.1 G/DL — LOW (ref 3.3–5)
ALP SERPL-CCNC: 79 U/L — SIGNIFICANT CHANGE UP (ref 40–120)
ALT FLD-CCNC: 18 U/L — SIGNIFICANT CHANGE UP (ref 10–45)
ANION GAP SERPL CALC-SCNC: 9 MMOL/L — SIGNIFICANT CHANGE UP (ref 5–17)
AST SERPL-CCNC: 11 U/L — SIGNIFICANT CHANGE UP (ref 10–40)
BASOPHILS # BLD AUTO: 0.06 K/UL — SIGNIFICANT CHANGE UP (ref 0–0.2)
BASOPHILS NFR BLD AUTO: 0.6 % — SIGNIFICANT CHANGE UP (ref 0–2)
BILIRUB SERPL-MCNC: 0.2 MG/DL — SIGNIFICANT CHANGE UP (ref 0.2–1.2)
BUN SERPL-MCNC: 23 MG/DL — SIGNIFICANT CHANGE UP (ref 7–23)
CALCIUM SERPL-MCNC: 9.8 MG/DL — SIGNIFICANT CHANGE UP (ref 8.4–10.5)
CHLORIDE SERPL-SCNC: 100 MMOL/L — SIGNIFICANT CHANGE UP (ref 96–108)
CO2 SERPL-SCNC: 26 MMOL/L — SIGNIFICANT CHANGE UP (ref 22–31)
CREAT SERPL-MCNC: 1.06 MG/DL — SIGNIFICANT CHANGE UP (ref 0.5–1.3)
EGFR: 81 ML/MIN/1.73M2 — SIGNIFICANT CHANGE UP
EOSINOPHIL # BLD AUTO: 0.24 K/UL — SIGNIFICANT CHANGE UP (ref 0–0.5)
EOSINOPHIL NFR BLD AUTO: 2.4 % — SIGNIFICANT CHANGE UP (ref 0–6)
GLUCOSE SERPL-MCNC: 254 MG/DL — HIGH (ref 70–99)
HCT VFR BLD CALC: 30.4 % — LOW (ref 39–50)
HGB BLD-MCNC: 10.1 G/DL — LOW (ref 13–17)
IMM GRANULOCYTES NFR BLD AUTO: 0.8 % — SIGNIFICANT CHANGE UP (ref 0–0.9)
LYMPHOCYTES # BLD AUTO: 2.22 K/UL — SIGNIFICANT CHANGE UP (ref 1–3.3)
LYMPHOCYTES # BLD AUTO: 22.4 % — SIGNIFICANT CHANGE UP (ref 13–44)
MAGNESIUM SERPL-MCNC: 1.8 MG/DL — SIGNIFICANT CHANGE UP (ref 1.6–2.6)
MCHC RBC-ENTMCNC: 27.4 PG — SIGNIFICANT CHANGE UP (ref 27–34)
MCHC RBC-ENTMCNC: 33.2 GM/DL — SIGNIFICANT CHANGE UP (ref 32–36)
MCV RBC AUTO: 82.6 FL — SIGNIFICANT CHANGE UP (ref 80–100)
MONOCYTES # BLD AUTO: 0.47 K/UL — SIGNIFICANT CHANGE UP (ref 0–0.9)
MONOCYTES NFR BLD AUTO: 4.7 % — SIGNIFICANT CHANGE UP (ref 2–14)
MRSA PCR RESULT.: SIGNIFICANT CHANGE UP
NEUTROPHILS # BLD AUTO: 6.84 K/UL — SIGNIFICANT CHANGE UP (ref 1.8–7.4)
NEUTROPHILS NFR BLD AUTO: 69.1 % — SIGNIFICANT CHANGE UP (ref 43–77)
NRBC # BLD: 0 /100 WBCS — SIGNIFICANT CHANGE UP (ref 0–0)
PHOSPHATE SERPL-MCNC: 3.4 MG/DL — SIGNIFICANT CHANGE UP (ref 2.5–4.5)
PLATELET # BLD AUTO: 520 K/UL — HIGH (ref 150–400)
POTASSIUM SERPL-MCNC: 4.7 MMOL/L — SIGNIFICANT CHANGE UP (ref 3.5–5.3)
POTASSIUM SERPL-SCNC: 4.7 MMOL/L — SIGNIFICANT CHANGE UP (ref 3.5–5.3)
PROT SERPL-MCNC: 6.9 G/DL — SIGNIFICANT CHANGE UP (ref 6–8.3)
RBC # BLD: 3.68 M/UL — LOW (ref 4.2–5.8)
RBC # FLD: 11.8 % — SIGNIFICANT CHANGE UP (ref 10.3–14.5)
S AUREUS DNA NOSE QL NAA+PROBE: SIGNIFICANT CHANGE UP
SODIUM SERPL-SCNC: 135 MMOL/L — SIGNIFICANT CHANGE UP (ref 135–145)
WBC # BLD: 9.91 K/UL — SIGNIFICANT CHANGE UP (ref 3.8–10.5)
WBC # FLD AUTO: 9.91 K/UL — SIGNIFICANT CHANGE UP (ref 3.8–10.5)

## 2024-04-14 PROCEDURE — 71045 X-RAY EXAM CHEST 1 VIEW: CPT | Mod: 26

## 2024-04-14 RX ORDER — CHLORHEXIDINE GLUCONATE 213 G/1000ML
1 SOLUTION TOPICAL DAILY
Refills: 0 | Status: DISCONTINUED | OUTPATIENT
Start: 2024-04-14 | End: 2024-04-16

## 2024-04-14 RX ADMIN — CHLORHEXIDINE GLUCONATE 1 APPLICATION(S): 213 SOLUTION TOPICAL at 11:51

## 2024-04-14 RX ADMIN — Medication 81 MILLIGRAM(S): at 11:50

## 2024-04-14 RX ADMIN — ERTAPENEM SODIUM 120 MILLIGRAM(S): 1 INJECTION, POWDER, LYOPHILIZED, FOR SOLUTION INTRAMUSCULAR; INTRAVENOUS at 15:10

## 2024-04-14 NOTE — PROVIDER CONTACT NOTE (OTHER) - ASSESSMENT
Pt is A&Ox4. Left for angiogram.
no pain noted; no respiratory distress noted; no complaints at this time
no acute distress noted
Pt is refusing finger stick and does not want them done anymore, educated patient regarding importance of monitoring and he still insisted on no finger sticks
PT is refusing afternoon finger stick, he refused lunch time finger stick as well, previous covering MD made aware
Pt was AOX4, VS within normal limits
bleeding from sight (left foot) no signs and symptoms of distress
Vac. on left foot in place

## 2024-04-14 NOTE — ADVANCED PRACTICE NURSE CONSULT - REASON FOR CONSULT
Vascular Access Team    Evaluation for: Bedside SL PICC placement  Requested by name: Tanner Pimentel  Date/Time: 4/12/2024    Indication for PICC: IV abt Ertapenem thru 5/23  Allergy to CHG or Heparin or Lidocaine: No    Platelets(>20): 515  INR(<3): 1.10  eGFR(>40): 60  Blood cultures sent: 4/2 2024  Blood culture negative in 48hrs: Yes  Anticoagulants/Antiplatelets: ASA 81mg  Arms DVT:  Mastectomy: No  Fistula: No  PPM/Defib: No  IR or Nephrology or ID clearance needed: No    Consent obtained: No    Pending: Consent    Plan: Bedside picc order evaluated. Please obtain consent and call [phone] when consent obatined and with any questions.  
Bedside picc order placed.   Indication: SL picc placement for_long term antibiotics

## 2024-04-14 NOTE — PROGRESS NOTE ADULT - SUBJECTIVE AND OBJECTIVE BOX
[facility] HEMATOLOGY/ONCOLOGY INPATIENT PROGRESS NOTE     Interval Hx:   04-14-24: Mr. Menon was seen at bedside today.    Meds:   MEDICATIONS  (STANDING):  aspirin  chewable 81 milliGRAM(s) Oral daily  dextrose 10% Bolus 125 milliLiter(s) IV Bolus once  dextrose 5%. 1000 milliLiter(s) (100 mL/Hr) IV Continuous <Continuous>  dextrose 5%. 1000 milliLiter(s) (50 mL/Hr) IV Continuous <Continuous>  dextrose 50% Injectable 12.5 Gram(s) IV Push once  dextrose 50% Injectable 25 Gram(s) IV Push once  ertapenem  IVPB 1000 milliGRAM(s) IV Intermittent every 24 hours  glucagon  Injectable 1 milliGRAM(s) IntraMuscular once  influenza   Vaccine 0.5 milliLiter(s) IntraMuscular once  insulin lispro (ADMELOG) corrective regimen sliding scale   SubCutaneous at bedtime  insulin lispro (ADMELOG) corrective regimen sliding scale   SubCutaneous three times a day before meals    MEDICATIONS  (PRN):  acetaminophen     Tablet .. 650 milliGRAM(s) Oral every 6 hours PRN Mild Pain (1 - 3)  dextrose Oral Gel 15 Gram(s) Oral once PRN Blood Glucose LESS THAN 70 milliGRAM(s)/deciliter  HYDROmorphone  Injectable 0.5 milliGRAM(s) IV Push every 4 hours PRN Severe Pain (7 - 10)  oxycodone    5 mG/acetaminophen 325 mG 1 Tablet(s) Oral every 4 hours PRN Moderate Pain (4 - 6)    Vital Signs Last 24 Hrs  T(C): 36.6 (14 Apr 2024 05:44), Max: 36.8 (13 Apr 2024 13:03)  T(F): 97.9 (14 Apr 2024 05:44), Max: 98.3 (13 Apr 2024 13:03)  HR: 69 (14 Apr 2024 05:44) (69 - 78)  BP: 123/71 (14 Apr 2024 05:44) (115/60 - 123/71)  BP(mean): --  RR: 18 (14 Apr 2024 05:44) (16 - 18)  SpO2: 98% (14 Apr 2024 05:44) (98% - 98%)    Parameters below as of 14 Apr 2024 05:44  Patient On (Oxygen Delivery Method): room air    Physical Exam:  Gen: NAD  HEENT: EOMI, MMM  Chest: equal chest rise, speaking full sentences   Cardiac: RR  Abd: Nondistended  Ext: LLE/foot cellulitis now surgical  Neuro: AAOx3, normal mood and affect     Labs:                        9.8    9.63  )-----------( 515      ( 13 Apr 2024 07:05 )             29.8     CBC Full  -  ( 13 Apr 2024 07:05 )  WBC Count : 9.63 K/uL  RBC Count : 3.56 M/uL  Hemoglobin : 9.8 g/dL  Hematocrit : 29.8 %  Platelet Count - Automated : 515 K/uL  Mean Cell Volume : 83.7 fl  Mean Cell Hemoglobin : 27.5 pg  Mean Cell Hemoglobin Concentration : 32.9 gm/dL    04-13    137  |  100  |  26<H>  ----------------------------<  260<H>  4.8   |  24  |  1.36<H>    Ca    9.6      13 Apr 2024 07:05  Phos  3.5     04-13  Mg     2.0     04-13      PT/INR - ( 12 Apr 2024 06:40 )   PT: 12.1 sec;   INR: 1.10 ratio         PTT - ( 12 Apr 2024 06:40 )  PTT:32.4 sec   [facility] [place] HEMATOLOGY/ONCOLOGY INPATIENT PROGRESS NOTE     Interval Hx:   04-14-24: Mr. Menon was seen at bedside today, no overnight events, notably upset regarding infection and surgery and ongoing concern for viability of remaining tissue in addition to having PICC line, counseled, he is aware, questions answered to the best of my ability     Meds:   MEDICATIONS  (STANDING):  aspirin  chewable 81 milliGRAM(s) Oral daily  dextrose 10% Bolus 125 milliLiter(s) IV Bolus once  dextrose 5%. 1000 milliLiter(s) (100 mL/Hr) IV Continuous <Continuous>  dextrose 5%. 1000 milliLiter(s) (50 mL/Hr) IV Continuous <Continuous>  dextrose 50% Injectable 12.5 Gram(s) IV Push once  dextrose 50% Injectable 25 Gram(s) IV Push once  ertapenem  IVPB 1000 milliGRAM(s) IV Intermittent every 24 hours  glucagon  Injectable 1 milliGRAM(s) IntraMuscular once  influenza   Vaccine 0.5 milliLiter(s) IntraMuscular once  insulin lispro (ADMELOG) corrective regimen sliding scale   SubCutaneous at bedtime  insulin lispro (ADMELOG) corrective regimen sliding scale   SubCutaneous three times a day before meals    MEDICATIONS  (PRN):  acetaminophen     Tablet .. 650 milliGRAM(s) Oral every 6 hours PRN Mild Pain (1 - 3)  dextrose Oral Gel 15 Gram(s) Oral once PRN Blood Glucose LESS THAN 70 milliGRAM(s)/deciliter  HYDROmorphone  Injectable 0.5 milliGRAM(s) IV Push every 4 hours PRN Severe Pain (7 - 10)  oxycodone    5 mG/acetaminophen 325 mG 1 Tablet(s) Oral every 4 hours PRN Moderate Pain (4 - 6)    Vital Signs Last 24 Hrs  T(C): 36.6 (14 Apr 2024 05:44), Max: 36.8 (13 Apr 2024 13:03)  T(F): 97.9 (14 Apr 2024 05:44), Max: 98.3 (13 Apr 2024 13:03)  HR: 69 (14 Apr 2024 05:44) (69 - 78)  BP: 123/71 (14 Apr 2024 05:44) (115/60 - 123/71)  BP(mean): --  RR: 18 (14 Apr 2024 05:44) (16 - 18)  SpO2: 98% (14 Apr 2024 05:44) (98% - 98%)    Parameters below as of 14 Apr 2024 05:44  Patient On (Oxygen Delivery Method): room air    Physical Exam:  Gen: NAD  HEENT: EOMI, MMM  Chest: equal chest rise, speaking full sentences   Cardiac: RR  Abd: Nondistended  Ext: LLE/foot cellulitis now surgical  Neuro: AAOx3, normal mood and affect     Labs:                        9.8    9.63  )-----------( 515      ( 13 Apr 2024 07:05 )             29.8     CBC Full  -  ( 13 Apr 2024 07:05 )  WBC Count : 9.63 K/uL  RBC Count : 3.56 M/uL  Hemoglobin : 9.8 g/dL  Hematocrit : 29.8 %  Platelet Count - Automated : 515 K/uL  Mean Cell Volume : 83.7 fl  Mean Cell Hemoglobin : 27.5 pg  Mean Cell Hemoglobin Concentration : 32.9 gm/dL    04-13    137  |  100  |  26<H>  ----------------------------<  260<H>  4.8   |  24  |  1.36<H>    Ca    9.6      13 Apr 2024 07:05  Phos  3.5     04-13  Mg     2.0     04-13      PT/INR - ( 12 Apr 2024 06:40 )   PT: 12.1 sec;   INR: 1.10 ratio         PTT - ( 12 Apr 2024 06:40 )  PTT:32.4 sec

## 2024-04-14 NOTE — PHYSICAL THERAPY INITIAL EVALUATION ADULT - ADDITIONAL COMMENTS
Pt lives with son in pvt house with 4 steps to enter and FOS to bedroom both with rail. Pt states he can sleep on first floor and it has a bathroom. Pt was independent prior to admission without AD.

## 2024-04-14 NOTE — PROGRESS NOTE ADULT - SUBJECTIVE AND OBJECTIVE BOX
SUBJECTIVE / OVERNIGHT EVENTS:       No events noted overnight      --------------------------------------------------------------------------------------------  LABS:                        9.8    9.63  )-----------( 515      ( 13 Apr 2024 07:05 )             29.8     04-13    137  |  100  |  26<H>  ----------------------------<  260<H>  4.8   |  24  |  1.36<H>    Ca    9.6      13 Apr 2024 07:05  Phos  3.5     04-13  Mg     2.0     04-13      PT/INR - ( 12 Apr 2024 06:40 )   PT: 12.1 sec;   INR: 1.10 ratio         PTT - ( 12 Apr 2024 06:40 )  PTT:32.4 sec  CAPILLARY BLOOD GLUCOSE            Urinalysis Basic - ( 13 Apr 2024 07:05 )    Color: x / Appearance: x / SG: x / pH: x  Gluc: 260 mg/dL / Ketone: x  / Bili: x / Urobili: x   Blood: x / Protein: x / Nitrite: x   Leuk Esterase: x / RBC: x / WBC x   Sq Epi: x / Non Sq Epi: x / Bacteria: x        RADIOLOGY & ADDITIONAL TESTS:     Imaging Personally Reviewed:  [x] YES  [ ] NO    Consultant(s) Notes Reviewed:  [x] YES  [ ] NO    MEDICATIONS  (STANDING):  aspirin  chewable 81 milliGRAM(s) Oral daily  dextrose 10% Bolus 125 milliLiter(s) IV Bolus once  dextrose 5%. 1000 milliLiter(s) (50 mL/Hr) IV Continuous <Continuous>  dextrose 5%. 1000 milliLiter(s) (100 mL/Hr) IV Continuous <Continuous>  dextrose 50% Injectable 12.5 Gram(s) IV Push once  dextrose 50% Injectable 25 Gram(s) IV Push once  ertapenem  IVPB 1000 milliGRAM(s) IV Intermittent every 24 hours  glucagon  Injectable 1 milliGRAM(s) IntraMuscular once  influenza   Vaccine 0.5 milliLiter(s) IntraMuscular once  insulin lispro (ADMELOG) corrective regimen sliding scale   SubCutaneous at bedtime  insulin lispro (ADMELOG) corrective regimen sliding scale   SubCutaneous three times a day before meals    MEDICATIONS  (PRN):  acetaminophen     Tablet .. 650 milliGRAM(s) Oral every 6 hours PRN Mild Pain (1 - 3)  dextrose Oral Gel 15 Gram(s) Oral once PRN Blood Glucose LESS THAN 70 milliGRAM(s)/deciliter  HYDROmorphone  Injectable 0.5 milliGRAM(s) IV Push every 4 hours PRN Severe Pain (7 - 10)  oxycodone    5 mG/acetaminophen 325 mG 1 Tablet(s) Oral every 4 hours PRN Moderate Pain (4 - 6)      Care Discussed with Consultants/Other Providers [x] YES  [ ] NO    Vital Signs Last 24 Hrs  T(C): 36.6 (14 Apr 2024 05:44), Max: 36.8 (13 Apr 2024 13:03)  T(F): 97.9 (14 Apr 2024 05:44), Max: 98.3 (13 Apr 2024 13:03)  HR: 69 (14 Apr 2024 05:44) (69 - 78)  BP: 123/71 (14 Apr 2024 05:44) (115/60 - 123/71)  BP(mean): --  RR: 18 (14 Apr 2024 05:44) (16 - 18)  SpO2: 98% (14 Apr 2024 05:44) (98% - 98%)    Parameters below as of 14 Apr 2024 05:44  Patient On (Oxygen Delivery Method): room air      I&O's Summary    12 Apr 2024 07:01  -  13 Apr 2024 07:00  --------------------------------------------------------  IN: 240 mL / OUT: 220 mL / NET: 20 mL    13 Apr 2024 07:01  -  14 Apr 2024 06:31  --------------------------------------------------------  IN: 200 mL / OUT: 1475 mL / NET: -1275 mL      PHYSICAL EXAM:  GENERAL: NAD, well-developed, comfortable  HEAD:  Atraumatic, Normocephalic  EYES: EOMI, PERRLA, conjunctiva and sclera clear  NECK: Supple, No JVD  CHEST/LUNG: Clear to auscultation bilaterally; No wheeze  HEART: Regular rate and rhythm; No murmurs, rubs, or gallops  ABDOMEN: Soft, Nontender, Nondistended; Bowel sounds present  NEURO: AAOx3, no focal weakness, 5/5 b/l extremity strength, b/l knee no arthritis, no effusion   EXTREMITIES:  LLE dsg c/d/i  SKIN: No rashes or lesions

## 2024-04-14 NOTE — PROGRESS NOTE ADULT - SUBJECTIVE AND OBJECTIVE BOX
[facility] Progress Note    MAR, chart notes, fingersticks and labs reviewed    Subjective: anxiously waiting to go home, pending PICC line, states the antibiotics are causing the hyperglycemia as they're in dextrose fluid    Objective  Vital Signs  T(C): 36.6 (04-14-24 @ 05:44), Max: 36.8 (04-13-24 @ 13:03)  HR: 69 (04-14-24 @ 05:44) (69 - 78)  BP: 123/71 (04-14-24 @ 05:44) (115/60 - 123/71)  RR: 18 (04-14-24 @ 05:44) (16 - 18)  SpO2: 98% (04-14-24 @ 05:44) (98% - 98%)    Physical Exam  Gen: NAD, alert, awake  HEENT: NC/AT, EOMI  Neck: supple  Chest: breathing comfortably  Heart: +s1 +s2    Medications  acetaminophen     Tablet .. 650 milliGRAM(s) Oral every 6 hours PRN  aspirin  chewable 81 milliGRAM(s) Oral daily  dextrose 10% Bolus 125 milliLiter(s) IV Bolus once  dextrose 5%. 1000 milliLiter(s) IV Continuous <Continuous>  dextrose 5%. 1000 milliLiter(s) IV Continuous <Continuous>  dextrose 50% Injectable 25 Gram(s) IV Push once  dextrose 50% Injectable 12.5 Gram(s) IV Push once  dextrose Oral Gel 15 Gram(s) Oral once PRN  ertapenem  IVPB 1000 milliGRAM(s) IV Intermittent every 24 hours  glucagon  Injectable 1 milliGRAM(s) IntraMuscular once  HYDROmorphone  Injectable 0.5 milliGRAM(s) IV Push every 4 hours PRN  influenza   Vaccine 0.5 milliLiter(s) IntraMuscular once  insulin lispro (ADMELOG) corrective regimen sliding scale   SubCutaneous at bedtime  insulin lispro (ADMELOG) corrective regimen sliding scale   SubCutaneous three times a day before meals  oxycodone    5 mG/acetaminophen 325 mG 1 Tablet(s) Oral every 4 hours PRN    Pertinent labs/Imaging    eGFR: 60 mL/min/1.73m2 (04-13-24 @ 07:05)

## 2024-04-14 NOTE — PHYSICAL THERAPY INITIAL EVALUATION ADULT - FOLLOWS COMMANDS/ANSWERS QUESTIONS, REHAB EVAL
100% of the time
100% of the time/able to follow multistep instructions
100% of the time/able to follow multistep instructions

## 2024-04-14 NOTE — PHYSICAL THERAPY INITIAL EVALUATION ADULT - PERTINENT HX OF CURRENT PROBLEM, REHAB EVAL
Pt is a 59y Male active smoker with a hx of Stage IV rectal adenocarcinoma s/p colectomy with colostomy (1/2024) and T2DM c/b peripheral neuropathy who presented from PCP concerning for L foot infection, found to have L fifth metatarsal osteomyelitis.

## 2024-04-14 NOTE — PROGRESS NOTE ADULT - SUBJECTIVE AND OBJECTIVE BOX
DATE OF SERVICE: 04-14-24 @ 14:06    Patient is a 59y old  Male who presents with a chief complaint of Sent to the ER by PCP following concern for LEFT foot infection (14 Apr 2024 07:53)      INTERVAL HISTORY: no complaints     REVIEW OF SYSTEMS:  CONSTITUTIONAL: No weakness  EYES/ENT: No visual changes;  No throat pain   NECK: No pain or stiffness  RESPIRATORY: No cough, wheezing; No shortness of breath  CARDIOVASCULAR: No chest pain or palpitations  GASTROINTESTINAL: No abdominal  pain. No nausea, vomiting, or hematemesis  GENITOURINARY: No dysuria, frequency or hematuria  NEUROLOGICAL: No stroke like symptoms  SKIN: No rashes        MEDICATIONS:        PHYSICAL EXAM:  T(C): 36.5 (04-14-24 @ 13:54), Max: 36.8 (04-13-24 @ 20:00)  HR: 70 (04-14-24 @ 13:54) (69 - 72)  BP: 105/67 (04-14-24 @ 13:54) (105/67 - 124/75)  RR: 18 (04-14-24 @ 13:54) (16 - 18)  SpO2: 95% (04-14-24 @ 13:54) (95% - 99%)  Wt(kg): --  I&O's Summary    13 Apr 2024 07:01  -  14 Apr 2024 07:00  --------------------------------------------------------  IN: 200 mL / OUT: 1475 mL / NET: -1275 mL          Appearance: In no distress	  HEENT:    PERRL, EOMI	  Cardiovascular:  S1 S2, No JVD  Respiratory: Lungs clear to auscultation	  Gastrointestinal:  Soft, Non-tender, + BS	  Vascularature:  No edema of LE  Psychiatric: Appropriate affect   Neuro: no acute focal deficits                               10.1   9.91  )-----------( 520      ( 14 Apr 2024 11:25 )             30.4     04-14    135  |  100  |  23  ----------------------------<  254<H>  4.7   |  26  |  1.06    Ca    9.8      14 Apr 2024 11:23  Phos  3.4     04-14  Mg     1.8     04-14    TPro  6.9  /  Alb  3.1<L>  /  TBili  0.2  /  DBili  x   /  AST  11  /  ALT  18  /  AlkPhos  79  04-14        Labs personally reviewed      ASSESSMENT/PLAN: 	    This is a 59 year old male PMH: Colon ca, Tobacco dependance.  Admitted for left foot pain. + Ulcer      1. Preop Risk Stratification - TTE unremarkable with preserved EF  - EKG non ischemic   - Reports good functional capacity prior to diabetic foot ulcer  - Elevated risk for mod risk LE angio and pods surgery, no contraindication to proceed  - s/p peripheral angio with BMS placement.   - L foot 5th metatarsal head resection with graft application   - Tolerated surgery well.  - Return to OR for left foot partial fifth ray resection, wound debridement and graft application today 4/12 at 1pm with Dr. Call   -  s/p left foot wound debridement w kerecis micro graft. Tolerated well.       2.   Diabetic foot ulcer  - MRI foot  suggestive of early osteomyelitis   - C/w local wound care per Pod  - Abx as per primary team  - POD following-> s/p left foot fifth metatarsal head resection, open 4/10  - Plan for left foot partial fifth ray resection, wound debridement and graft application today 4/12 at 1pm with Dr. Call   --  s/p left foot wound debridement w kerecis micro graft. Tolerated well.     3.  Dm2:  - HgbA1c 6.8  - ISS as per primary team    4 DVT ppx:  - Lovenox sq- on hold for Sx              GIANNI Medrano DO [facility]  Cardiovascular Medicine  [address] [place] [address], [address]  Office: [phone]  Available via call/text on [employer] Teams

## 2024-04-14 NOTE — ADVANCED PRACTICE NURSE CONSULT - ASSESSMENT
PICC Insertion Note  Patient  educated about central line associated blood stream infection prevention practices.  Catheter type: SL Picc  : Bard  Power injectable: Yes  LOT# [id number]    Informed consent obtained by covering floor team.  Procedure assisted by: Lili Greene RN  Time out was preformed, confirming the patient's first and last name, date of birth, procedure, and correct site prior to state of procedure.    Patient was placed in supine position. Patient placement site was prepped with chlorhexidine solution, then draped using maximum sterile barrier protection. The area was injected with 2 ml of 1% lidocaine. Using the ultrasound, the catheter was introduced. Strict adherence to outline aseptic technique. Upon completion of line placement, the insertion site was covered with a sterile occlusive dressing. Pt tolerated procedure well. Minimal blood loss.   Pre procedure v/s: /75, HR 75, Temp 36.7, O2sat 97%  Post Procedure v/s /71, HR 78, Temp, O2sat 96%    All materials used for catheter insertion, including the intact guide wire, were accounted for at the end of the procedure.    Number of attempts: 1  Complications/Comments: None    Emergency Placement: No  Site: New  Anatomical Site of insertion: Left Brachial   Catheter size/length: 4 F, 46 cm  US guided Bard SL power picc placed    Post procedure verification with chest Xray as per orders.

## 2024-04-14 NOTE — PROGRESS NOTE ADULT - ASSESSMENT
59y old Male with history of T2DM not on meds, Colon cancer s/p colectomy and colostomy and peripheral neuropathy here with acute osteomyelitis of left metatarsal s/p resection on 4/10.     1. T2DM with hyperglycemia  - No FS yesterday  - He has agreed to outpatient follow up and treatment if his FS don't improve at home in 1-2 weeks  - Monitor FS as the patient allows    2. Left 5th metatarsal osteomyelitis s/p amputation  - podiatry following, s/p graft placement  - on Zosyn, ID following    Will continue to follow.    Catracho Mooney MD  [facility]    [address] [place] [address]    T [phone]  F [phone]

## 2024-04-14 NOTE — PHYSICAL THERAPY INITIAL EVALUATION ADULT - GENERAL OBSERVATIONS, REHAB EVAL
Pt received semi-supine in bed in NAD, +IV Lock, +L foot dressed C/D/I. Pt AOx4, pleasant and cooperative. Pt scheduled for OR later today. PT will f/u for additional functional evaluation post surgery.
Pt received semi-supine in bed in NAD, +IV Lock, +L foot dressed C/D/I. Pt AOx4, pleasant and cooperative. Pt scheduled for OR later today. PT will f/u for additional functional evaluation post surgery.
pt received semi-supine in bed, A&0x4, +wound vac, following 100% multi-step commands, s/p 5th metatarsal head and neck resection on 4/12

## 2024-04-14 NOTE — PROGRESS NOTE ADULT - PROBLEM SELECTOR PLAN 1
In setting of diabetic foot wound. MRI demonstrating early osteomyelitis of L foot 5th metatarsal head and base of proximal phalanx.  - DAMIEN demonstrating LLE PAD  - BCx: NGTD  - Wound Cx: Klebsiella   - Podiatry following: OR for L foot 5th metatarsal head resection with graft application 4/10 and 4/12  - Vascular surgery following: s/p LLE angiogram  - ID following: switch to Zosyn  - WBC elevated today will CTM and appreciate id recs; afebrile In setting of diabetic foot wound. MRI demonstrating early osteomyelitis of L foot 5th metatarsal head and base of proximal phalanx.  - DAMIEN demonstrating LLE PAD  - BCx: NGTD  - Wound Cx: Klebsiella   - Podiatry following: OR for L foot 5th metatarsal head resection with graft application 4/10 and 4/12  - Vascular surgery following: s/p LLE angiogram  - ID following: switch to Ertapenem --> PICC Line pending  - afebrile

## 2024-04-14 NOTE — PROGRESS NOTE ADULT - ASSESSMENT
Patient is a 59y Male active smoker with a hx of Stage IV rectal adenocarcinoma s/p colectomy with colostomy (1/2024) and T2DM c/b peripheral neuropathy who presented from PCP concerning for L foot infection, found to have L fifth metatarsal osteomyelitis. OR with podiatry pending vascular surgery. LLE angiogram with possible stent scheduled for 4/8. L foot 5th metatarsal head resection with graft application scheduled for 4/10.    PLANS:    # Osteomyelitis:   - In setting of diabetic foot wound. MRI demonstrating early osteomyelitis of L foot 5th metatarsal head and base of proximal phalanx.  - DAMIEN demonstrating LLE PAD  - BCx: NGTD  - Wound Cx: Klebsiella   - Podiatry following: sp OR for L foot 5th metatarsal head resection with graft application 4/10, S/p RTO 4/12 for left foot partial 5th ray resection with wound debridement and Kerecis graft application with Dr. Call  - Vascular surgery following: LLE angiogram balloon angioplasty and stent to SFA on 4/8  - ID following: C/w IV abx  - PT eval pending    # Rectal adenocarcinoma:  - Stage IV Rectal Adenocarcinoma with metastasis to liver diagnosed 08/2023. Follows with Dr. Renny Purvis.  - Oncology following  - Chemotherapy held in setting of infection  - Follow-up outpatient.    # DM2:  - HgbA1c 6.8%  - Continue to monitor blood glucose levels  - Sliding Scale    # DVT ppx:  - IPCs    [facility]  [phone]

## 2024-04-14 NOTE — PROVIDER CONTACT NOTE (OTHER) - BACKGROUND
local infection of foot and subcutaneous tissue
pt admitted with local infection of skin and subcutaneous tissue
Diabetic foot ulcer on left foot
S/P debridement of left foot
local infection of skin

## 2024-04-14 NOTE — PROGRESS NOTE ADULT - ASSESSMENT
Mr. Menon is a 59 year old male active smoker with PMHx of  peripheral neuropathy, Type II DM,  anemia,  status post colectomy with colostomy 01/2024  due to rectal cancer under the care of Dr. Renny Purvis now admitted with osteomyelitis of the left foot.  MRI foot shows Findings suggestive of early osteomyelitis at the fifth metatarsal head and base of the proximal phalanx, deep to the soft tissue wound. Focal osteitis is also a differential consideration for the osseous findings. Podiatry pending resection with graft. Infectious disease consulted.    Labs show hemoglobin 11.6, hematocrit  34.7, MCV 83.6 platelet count 339, WBC 12.14 with neutrophilic predominance. Creatinine 1.12 with normal liver function.  HbA1c 6.8.  TTE with EF 69%.     Patient last seen in Hematology Oncology Clinic 04/02/2024  and oxaliplatin was discontinued due to peripheral neuropathy. Podiatry scheduled pt for left foot fifth metatarsal head resection with wound debridement and graft application 04/04/2024, DAMIEN concerning, vascular performed LLE angio, balloon angioplasty and stent to SFA 04/08/2024.     04/11/2024: s/p 5th metatarsal head and neck resection with podiatry, pending return to OR with podiatry today, noted Endo recs, counseled pt on need for glycemic control including for acute wound healing, but also chronically for optimal medical optimization given on going need for antineoplastic therapy, he was frustrated and would like to re-visit topic at later date, stated he believes insulin makes his blood sugars higher, delineated mechanism of therapy but continued frustration, will continue to  as appropriate.     04/12/2024:  tolerated return to OR well 04/12/2024 with podiatry, concern for ongoing viability given prolonged capillary refill time     # Stage IV Rectal Adenocarcinoma   - Diagnosed 08/2023  - Invasive, moderately differentiated colonic adenocarcinoma  - Low probability of MSI-high  - HER-2-Mary Negative  - KRAS is Wild Type  - Metastatic to liver  - FOLFOX and weekly Cetuximab started 9/18/23  - PET-CT from 03/20/2024 shows that the patient is status post surgery with no abnormal FDG uptake in the pelvis postsurgical area.  More prominent uptake in the gallbladder fossa (early recurrence versus normal fluctuation of uptake ) and no other FDG avid lesion in the field-of-view.  Previously noted liver Mets are not presently seen.  - Was scheduled to receive 5FU, Leucovorin and Cetuximab 04/02/2024, therapy held due to concern for L foot infection and pt sent to Podiatry   - Plan to continue therapy once able to from infection standpoint, pt is aware     # Left Foot Osteomyelitis  # PVD  - Vascular surgery DAMIEN suggestive of moderate arterial flow limitation in the left lower extremity localizing to the iliofemoral distribution  --DAMIEN LLE with 0.65 indicating moderate lower extremity arterial vascular disease  - LLE angio, balloon angioplasty and stent to SFA 04/08/2024]  - Podiatry s/p left foot fifth metatarsal head/neck resection with wound debridement and graft application, 04/11/2024   -  tolerated return to OR well 04/12/2024 with podiatry, concern for ongoing viability given prolonged capillary refill time   - Antibiotics per ID and primary team     # Neutrophilia   - Due to infection  - Outpatient labs 04/02/2024 WBC 16.19 ANC 14,170  - Continue to monitor, improving     # Normocytic anemia  - Likely multifactorial, active infection, cancer, chemotherapy  - Outpatient labs 04/02/2024 Hb 13.7, MCV 83.5   - Continue to monitor  - Transfuse to maintain Hb > 7     # Nicotine dependence, active cigarette smoker  - Counseling provided, pt aware of possible compromised wound healing     # Type II DM  - HBA1c 6.8  - inpatient Hyperglycemia noted  - Endocrinology following  - Counseling provided daily       Thank you for allowing me to participate in the care of Mr. Menon, please do not hesitate to call or text me if you have further questions or concerns.     Gato Horvath MD  [facility]   Division of Hematology/Oncology  [address] [place] [address], [address]  [place] [address]  P: [phone]  F: [phone]    Attestation:    ----Pt evaluated including face-to-face interaction in addition to chart review, reviewing treatment plan, and managing the patient’s chronic diagnoses as listed in the assessment---- Mr. Menon is a 59 year old male active smoker with PMHx of  peripheral neuropathy, Type II DM,  anemia,  status post colectomy with colostomy 01/2024  due to rectal cancer under the care of Dr. Renny Purvis now admitted with osteomyelitis of the left foot.  MRI foot shows Findings suggestive of early osteomyelitis at the fifth metatarsal head and base of the proximal phalanx, deep to the soft tissue wound. Focal osteitis is also a differential consideration for the osseous findings. Podiatry pending resection with graft. Infectious disease consulted.    Labs show hemoglobin 11.6, hematocrit  34.7, MCV 83.6 platelet count 339, WBC 12.14 with neutrophilic predominance. Creatinine 1.12 with normal liver function.  HbA1c 6.8.  TTE with EF 69%.     Patient last seen in Hematology Oncology Clinic 04/02/2024  and oxaliplatin was discontinued due to peripheral neuropathy. Podiatry scheduled pt for left foot fifth metatarsal head resection with wound debridement and graft application 04/04/2024, DAMIEN concerning, vascular performed LLE angio, balloon angioplasty and stent to SFA 04/08/2024.     04/11/2024: s/p 5th metatarsal head and neck resection with podiatry, pending return to OR with podiatry today, noted Endo recs, counseled pt on need for glycemic control including for acute wound healing, but also chronically for optimal medical optimization given on going need for antineoplastic therapy, he was frustrated and would like to re-visit topic at later date, stated he believes insulin makes his blood sugars higher, delineated mechanism of therapy but continued frustration, will continue to  as appropriate.     04/12/2024:  tolerated return to OR well 04/12/2024 with podiatry, concern for ongoing viability given prolonged capillary refill time     # Stage IV Rectal Adenocarcinoma   - Diagnosed 08/2023  - Invasive, moderately differentiated colonic adenocarcinoma  - Low probability of MSI-high  - HER-2-Mary Negative  - KRAS is Wild Type  - Metastatic to liver  - FOLFOX and weekly Cetuximab started 9/18/23  - PET-CT from 03/20/2024 shows that the patient is status post surgery with no abnormal FDG uptake in the pelvis postsurgical area.  More prominent uptake in the gallbladder fossa (early recurrence versus normal fluctuation of uptake ) and no other FDG avid lesion in the field-of-view.  Previously noted liver Mets are not presently seen.  - Was scheduled to receive 5FU, Leucovorin and Cetuximab 04/02/2024, therapy held due to concern for L foot infection and pt sent to Podiatry   - Plan to continue therapy once able to from infection standpoint, pt is aware     # Left Foot Osteomyelitis  # PVD  - Vascular surgery DAMIEN suggestive of moderate arterial flow limitation in the left lower extremity localizing to the iliofemoral distribution  --DAMIEN LLE with 0.65 indicating moderate lower extremity arterial vascular disease  - LLE angio, balloon angioplasty and stent to SFA 04/08/2024]  - Podiatry s/p left foot fifth metatarsal head/neck resection with wound debridement and graft application, 04/11/2024   -  tolerated return to OR well 04/12/2024 with podiatry, concern for ongoing viability given prolonged capillary refill time   - Antibiotics per ID and primary team, PICC line for Abx     # Neutrophilia   # Thrombocytosis   - Likely due to infection  - Outpatient labs 04/02/2024 WBC 16.19 ANC 14,170   - Continue to monitor, improving     # Normocytic anemia  - Likely multifactorial, active infection, cancer, chemotherapy  - Outpatient labs 04/02/2024 Hb 13.7, MCV 83.5   - Continue to monitor  - Transfuse to maintain Hb > 7     # Nicotine dependence, active cigarette smoker  - Counseling provided, pt aware of possible compromised wound healing     # Type II DM  - HBA1c 6.8  - inpatient Hyperglycemia noted  - Endocrinology following  - Counseling provided daily       Thank you for allowing me to participate in the care of Mr. Menon, please do not hesitate to call or text me if you have further questions or concerns.     Gato Horvath MD  [facility]   Division of Hematology/Oncology  [address] [place] [address], [address]  [place] [address]  P: [phone]  F: [phone]    Attestation:    ----Pt evaluated including face-to-face interaction in addition to chart review, reviewing treatment plan, and managing the patient’s chronic diagnoses as listed in the assessment----

## 2024-04-14 NOTE — PHYSICAL THERAPY INITIAL EVALUATION ADULT - DID THE PATIENT HAVE SURGERY?
s/p left foot partial 5th ray resection with wound debridement and Kerecis graft application/yes
s/p 5th metatarsal head and neck resection/yes
Incision and drainage, bone abscess or osteomyelitis, foot 10-Apr-2024 08:52:06  dAeline White/yes

## 2024-04-14 NOTE — PROGRESS NOTE ADULT - SUBJECTIVE AND OBJECTIVE BOX
**************************************  Graeme Hoffman PGY 1  After 7PM, please contact night float at #[phone] or [phone]  **************************************    INTERVAL HPI/OVERNIGHT EVENTS:  Patient was seen and examined at bedside. As per nurse and patient, no o/n events, patient resting comfortably. No complaints at this time. Patient denies: fever, chills, dizziness, weakness, HA, Changes in vision, CP, palpitations, SOB, cough, N/V/D/C, dysuria, changes in bowel movements, LE edema. ROS otherwise negative.    VITAL SIGNS:  T(F): 97.9 (04-14-24 @ 05:44)  HR: 69 (04-14-24 @ 05:44)  BP: 123/71 (04-14-24 @ 05:44)  RR: 18 (04-14-24 @ 05:44)  SpO2: 98% (04-14-24 @ 05:44)  Wt(kg): --    PHYSICAL EXAM:    Constitutional: WDWN, NAD  HEENT: PERRL, EOMI, sclera non-icteric, neck supple, trachea midline, no masses, no JVD, MMM, good dentition  Respiratory: CTA b/l, good air entry b/l, no wheezing, no rhonchi, no rales, without accessory muscle use and no intercostal retractions  Cardiovascular: RRR, normal S1S2, no M/R/G  Gastrointestinal: soft, NTND, no masses palpable, BS normal  Extremities: Warm, well perfused, no edema, no clubbing.   Neurological: AAOx***, CN Grossly intact  Skin: Normal temperature, warm, dry    MEDICATIONS  (STANDING):  aspirin  chewable 81 milliGRAM(s) Oral daily  dextrose 10% Bolus 125 milliLiter(s) IV Bolus once  dextrose 5%. 1000 milliLiter(s) (100 mL/Hr) IV Continuous <Continuous>  dextrose 5%. 1000 milliLiter(s) (50 mL/Hr) IV Continuous <Continuous>  dextrose 50% Injectable 25 Gram(s) IV Push once  dextrose 50% Injectable 12.5 Gram(s) IV Push once  ertapenem  IVPB 1000 milliGRAM(s) IV Intermittent every 24 hours  glucagon  Injectable 1 milliGRAM(s) IntraMuscular once  influenza   Vaccine 0.5 milliLiter(s) IntraMuscular once  insulin lispro (ADMELOG) corrective regimen sliding scale   SubCutaneous three times a day before meals  insulin lispro (ADMELOG) corrective regimen sliding scale   SubCutaneous at bedtime    MEDICATIONS  (PRN):  acetaminophen     Tablet .. 650 milliGRAM(s) Oral every 6 hours PRN Mild Pain (1 - 3)  dextrose Oral Gel 15 Gram(s) Oral once PRN Blood Glucose LESS THAN 70 milliGRAM(s)/deciliter  HYDROmorphone  Injectable 0.5 milliGRAM(s) IV Push every 4 hours PRN Severe Pain (7 - 10)  oxycodone    5 mG/acetaminophen 325 mG 1 Tablet(s) Oral every 4 hours PRN Moderate Pain (4 - 6)      Allergies    No Known Allergies    Intolerances        LABS:                        9.8    9.63  )-----------( 515      ( 13 Apr 2024 07:05 )             29.8     04-13    137  |  100  |  26<H>  ----------------------------<  260<H>  4.8   |  24  |  1.36<H>    Ca    9.6      13 Apr 2024 07:05  Phos  3.5     04-13  Mg     2.0     04-13        Urinalysis Basic - ( 13 Apr 2024 07:05 )    Color: x / Appearance: x / SG: x / pH: x  Gluc: 260 mg/dL / Ketone: x  / Bili: x / Urobili: x   Blood: x / Protein: x / Nitrite: x   Leuk Esterase: x / RBC: x / WBC x   Sq Epi: x / Non Sq Epi: x / Bacteria: x        RADIOLOGY & ADDITIONAL TESTS:  Reviewed **************************************  Graeme Hoffman PGY 1  After 7PM, please contact night float at #[phone] or [phone]  **************************************    INTERVAL HPI/OVERNIGHT EVENTS:  Patient was seen and examined at bedside. As per nurse and patient, no o/n events, patient resting comfortably. No complaints at this time. Patient denies: fever, chills, dizziness, weakness, HA, Changes in vision, CP, palpitations, SOB, cough, N/V/D/C, dysuria, changes in bowel movements, LE edema. ROS otherwise negative.    VITAL SIGNS:  T(F): 97.9 (04-14-24 @ 05:44)  HR: 69 (04-14-24 @ 05:44)  BP: 123/71 (04-14-24 @ 05:44)  RR: 18 (04-14-24 @ 05:44)  SpO2: 98% (04-14-24 @ 05:44)  Wt(kg): --    PHYSICAL EXAM:    Constitutional: WDWN, NAD  HEENT: PERRL, EOMI, sclera non-icteric, neck supple, trachea midline, no masses, no JVD, MMM, good dentition  Respiratory: CTA b/l, good air entry b/l, no wheezing, no rhonchi, no rales, without accessory muscle use and no intercostal retractions  Cardiovascular: RRR, normal S1S2, no M/R/G  Gastrointestinal: soft, NTND, no masses palpable, BS normal  Extremities: Warm, well perfused, no edema, no clubbing.   Neurological: AAOx3, CN Grossly intact  Skin: Normal temperature, warm, dry    MEDICATIONS  (STANDING):  aspirin  chewable 81 milliGRAM(s) Oral daily  dextrose 10% Bolus 125 milliLiter(s) IV Bolus once  dextrose 5%. 1000 milliLiter(s) (100 mL/Hr) IV Continuous <Continuous>  dextrose 5%. 1000 milliLiter(s) (50 mL/Hr) IV Continuous <Continuous>  dextrose 50% Injectable 25 Gram(s) IV Push once  dextrose 50% Injectable 12.5 Gram(s) IV Push once  ertapenem  IVPB 1000 milliGRAM(s) IV Intermittent every 24 hours  glucagon  Injectable 1 milliGRAM(s) IntraMuscular once  influenza   Vaccine 0.5 milliLiter(s) IntraMuscular once  insulin lispro (ADMELOG) corrective regimen sliding scale   SubCutaneous three times a day before meals  insulin lispro (ADMELOG) corrective regimen sliding scale   SubCutaneous at bedtime    MEDICATIONS  (PRN):  acetaminophen     Tablet .. 650 milliGRAM(s) Oral every 6 hours PRN Mild Pain (1 - 3)  dextrose Oral Gel 15 Gram(s) Oral once PRN Blood Glucose LESS THAN 70 milliGRAM(s)/deciliter  HYDROmorphone  Injectable 0.5 milliGRAM(s) IV Push every 4 hours PRN Severe Pain (7 - 10)  oxycodone    5 mG/acetaminophen 325 mG 1 Tablet(s) Oral every 4 hours PRN Moderate Pain (4 - 6)      Allergies    No Known Allergies    Intolerances        LABS:                        9.8    9.63  )-----------( 515      ( 13 Apr 2024 07:05 )             29.8     04-13    137  |  100  |  26<H>  ----------------------------<  260<H>  4.8   |  24  |  1.36<H>    Ca    9.6      13 Apr 2024 07:05  Phos  3.5     04-13  Mg     2.0     04-13        Urinalysis Basic - ( 13 Apr 2024 07:05 )    Color: x / Appearance: x / SG: x / pH: x  Gluc: 260 mg/dL / Ketone: x  / Bili: x / Urobili: x   Blood: x / Protein: x / Nitrite: x   Leuk Esterase: x / RBC: x / WBC x   Sq Epi: x / Non Sq Epi: x / Bacteria: x        RADIOLOGY & ADDITIONAL TESTS:  Reviewed

## 2024-04-15 LAB
ANION GAP SERPL CALC-SCNC: 11 MMOL/L — SIGNIFICANT CHANGE UP (ref 5–17)
BUN SERPL-MCNC: 28 MG/DL — HIGH (ref 7–23)
CALCIUM SERPL-MCNC: 9.9 MG/DL — SIGNIFICANT CHANGE UP (ref 8.4–10.5)
CHLORIDE SERPL-SCNC: 102 MMOL/L — SIGNIFICANT CHANGE UP (ref 96–108)
CO2 SERPL-SCNC: 24 MMOL/L — SIGNIFICANT CHANGE UP (ref 22–31)
CREAT SERPL-MCNC: 1.22 MG/DL — SIGNIFICANT CHANGE UP (ref 0.5–1.3)
CULTURE RESULTS: NO GROWTH — SIGNIFICANT CHANGE UP
EGFR: 69 ML/MIN/1.73M2 — SIGNIFICANT CHANGE UP
GLUCOSE SERPL-MCNC: 176 MG/DL — HIGH (ref 70–99)
GRAM STN FLD: ABNORMAL
HCT VFR BLD CALC: 30.2 % — LOW (ref 39–50)
HGB BLD-MCNC: 10.2 G/DL — LOW (ref 13–17)
MAGNESIUM SERPL-MCNC: 1.9 MG/DL — SIGNIFICANT CHANGE UP (ref 1.6–2.6)
MCHC RBC-ENTMCNC: 28 PG — SIGNIFICANT CHANGE UP (ref 27–34)
MCHC RBC-ENTMCNC: 33.8 GM/DL — SIGNIFICANT CHANGE UP (ref 32–36)
MCV RBC AUTO: 83 FL — SIGNIFICANT CHANGE UP (ref 80–100)
NRBC # BLD: 0 /100 WBCS — SIGNIFICANT CHANGE UP (ref 0–0)
PHOSPHATE SERPL-MCNC: 3.9 MG/DL — SIGNIFICANT CHANGE UP (ref 2.5–4.5)
PLATELET # BLD AUTO: 525 K/UL — HIGH (ref 150–400)
POTASSIUM SERPL-MCNC: 4.9 MMOL/L — SIGNIFICANT CHANGE UP (ref 3.5–5.3)
POTASSIUM SERPL-SCNC: 4.9 MMOL/L — SIGNIFICANT CHANGE UP (ref 3.5–5.3)
RBC # BLD: 3.64 M/UL — LOW (ref 4.2–5.8)
RBC # FLD: 11.7 % — SIGNIFICANT CHANGE UP (ref 10.3–14.5)
SODIUM SERPL-SCNC: 137 MMOL/L — SIGNIFICANT CHANGE UP (ref 135–145)
SPECIMEN SOURCE: SIGNIFICANT CHANGE UP
WBC # BLD: 8.52 K/UL — SIGNIFICANT CHANGE UP (ref 3.8–10.5)
WBC # FLD AUTO: 8.52 K/UL — SIGNIFICANT CHANGE UP (ref 3.8–10.5)

## 2024-04-15 RX ORDER — ENOXAPARIN SODIUM 100 MG/ML
40 INJECTION SUBCUTANEOUS EVERY 24 HOURS
Refills: 0 | Status: DISCONTINUED | OUTPATIENT
Start: 2024-04-15 | End: 2024-04-16

## 2024-04-15 RX ORDER — CLOPIDOGREL BISULFATE 75 MG/1
75 TABLET, FILM COATED ORAL DAILY
Refills: 0 | Status: DISCONTINUED | OUTPATIENT
Start: 2024-04-15 | End: 2024-04-16

## 2024-04-15 RX ADMIN — ERTAPENEM SODIUM 120 MILLIGRAM(S): 1 INJECTION, POWDER, LYOPHILIZED, FOR SOLUTION INTRAMUSCULAR; INTRAVENOUS at 15:53

## 2024-04-15 RX ADMIN — CHLORHEXIDINE GLUCONATE 1 APPLICATION(S): 213 SOLUTION TOPICAL at 11:12

## 2024-04-15 RX ADMIN — Medication 81 MILLIGRAM(S): at 11:11

## 2024-04-15 RX ADMIN — ENOXAPARIN SODIUM 40 MILLIGRAM(S): 100 INJECTION SUBCUTANEOUS at 11:10

## 2024-04-15 RX ADMIN — CLOPIDOGREL BISULFATE 75 MILLIGRAM(S): 75 TABLET, FILM COATED ORAL at 11:11

## 2024-04-15 NOTE — PROGRESS NOTE ADULT - SUBJECTIVE AND OBJECTIVE BOX
SUBJECTIVE / OVERNIGHT EVENTS:      Patient seen and examined at bedside. No events noted overnight. Resting in the chair. Wound VAC in place. Eager for DC    --------------------------------------------------------------------------------------------  LABS:                        10.2   8.52  )-----------( 525      ( 15 Apr 2024 07:08 )             30.2     04-15    137  |  102  |  28<H>  ----------------------------<  176<H>  4.9   |  24  |  1.22    Ca    9.9      15 Apr 2024 07:08  Phos  3.9     04-15  Mg     1.9     04-15    TPro  6.9  /  Alb  3.1<L>  /  TBili  0.2  /  DBili  x   /  AST  11  /  ALT  18  /  AlkPhos  79  04-14      CAPILLARY BLOOD GLUCOSE            Urinalysis Basic - ( 15 Apr 2024 07:08 )    Color: x / Appearance: x / SG: x / pH: x  Gluc: 176 mg/dL / Ketone: x  / Bili: x / Urobili: x   Blood: x / Protein: x / Nitrite: x   Leuk Esterase: x / RBC: x / WBC x   Sq Epi: x / Non Sq Epi: x / Bacteria: x        RADIOLOGY & ADDITIONAL TESTS:    Imaging Personally Reviewed:  [x] YES  [ ] NO    Consultant(s) Notes Reviewed:  [x] YES  [ ] NO    MEDICATIONS  (STANDING):  aspirin  chewable 81 milliGRAM(s) Oral daily  chlorhexidine 4% Liquid 1 Application(s) Topical daily  clopidogrel Tablet 75 milliGRAM(s) Oral daily  dextrose 10% Bolus 125 milliLiter(s) IV Bolus once  dextrose 5%. 1000 milliLiter(s) (50 mL/Hr) IV Continuous <Continuous>  dextrose 5%. 1000 milliLiter(s) (100 mL/Hr) IV Continuous <Continuous>  dextrose 50% Injectable 12.5 Gram(s) IV Push once  dextrose 50% Injectable 25 Gram(s) IV Push once  enoxaparin Injectable 40 milliGRAM(s) SubCutaneous every 24 hours  ertapenem  IVPB 1000 milliGRAM(s) IV Intermittent every 24 hours  glucagon  Injectable 1 milliGRAM(s) IntraMuscular once  influenza   Vaccine 0.5 milliLiter(s) IntraMuscular once  insulin lispro (ADMELOG) corrective regimen sliding scale   SubCutaneous three times a day before meals  insulin lispro (ADMELOG) corrective regimen sliding scale   SubCutaneous at bedtime    MEDICATIONS  (PRN):  acetaminophen     Tablet .. 650 milliGRAM(s) Oral every 6 hours PRN Mild Pain (1 - 3)  dextrose Oral Gel 15 Gram(s) Oral once PRN Blood Glucose LESS THAN 70 milliGRAM(s)/deciliter  HYDROmorphone  Injectable 0.5 milliGRAM(s) IV Push every 4 hours PRN Severe Pain (7 - 10)  oxycodone    5 mG/acetaminophen 325 mG 1 Tablet(s) Oral every 4 hours PRN Moderate Pain (4 - 6)      Care Discussed with Consultants/Other Providers [x] YES  [ ] NO    Vital Signs Last 24 Hrs  T(C): 36.6 (15 Apr 2024 05:10), Max: 36.8 (14 Apr 2024 20:29)  T(F): 97.8 (15 Apr 2024 05:10), Max: 98.2 (14 Apr 2024 20:29)  HR: 73 (15 Apr 2024 05:10) (67 - 73)  BP: 115/78 (15 Apr 2024 05:10) (105/67 - 129/78)  BP(mean): --  RR: 18 (15 Apr 2024 05:10) (18 - 18)  SpO2: 98% (15 Apr 2024 05:10) (95% - 99%)    Parameters below as of 15 Apr 2024 05:10  Patient On (Oxygen Delivery Method): room air      I&O's Summary    14 Apr 2024 07:01  -  15 Apr 2024 07:00  --------------------------------------------------------  IN: 50 mL / OUT: 450 mL / NET: -400 mL      PHYSICAL EXAM:  GENERAL: NAD, well-developed, comfortable  HEAD:  Atraumatic, Normocephalic  EYES: EOMI, PERRLA, conjunctiva and sclera clear  NECK: Supple, No JVD  CHEST/LUNG: Clear to auscultation bilaterally; No wheeze  HEART: Regular rate and rhythm; No murmurs, rubs, or gallops  ABDOMEN: Soft, Nontender, Nondistended; Bowel sounds present  NEURO: AAOx3, no focal weakness, 5/5 b/l extremity strength, b/l knee no arthritis, no effusion   EXTREMITIES:  LLE dsg c/d/i, + Wound vac  SKIN: No rashes or lesions

## 2024-04-15 NOTE — PROGRESS NOTE ADULT - SUBJECTIVE AND OBJECTIVE BOX
[facility] Progress Note    MAR, chart notes, fingersticks and labs reviewed    Subjective: DC planning in process, anxiously awaiting    Objective  Vital Signs  T(C): 36.6 (04-15-24 @ 12:31), Max: 36.8 (04-14-24 @ 20:29)  HR: 76 (04-15-24 @ 12:31) (67 - 76)  BP: 118/69 (04-15-24 @ 12:31) (115/78 - 129/78)  RR: 18 (04-15-24 @ 12:31) (18 - 18)  SpO2: 96% (04-15-24 @ 12:31) (96% - 98%)    Physical Exam  Gen: NAD, alert, awake  HEENT: NC/AT, EOMI  Neck: supple  Chest: breathing comfortably  Heart: +s1 +s2    Medications  acetaminophen     Tablet .. 650 milliGRAM(s) Oral every 6 hours PRN  aspirin  chewable 81 milliGRAM(s) Oral daily  chlorhexidine 4% Liquid 1 Application(s) Topical daily  clopidogrel Tablet 75 milliGRAM(s) Oral daily  dextrose 10% Bolus 125 milliLiter(s) IV Bolus once  dextrose 5%. 1000 milliLiter(s) IV Continuous <Continuous>  dextrose 5%. 1000 milliLiter(s) IV Continuous <Continuous>  dextrose 50% Injectable 12.5 Gram(s) IV Push once  dextrose 50% Injectable 25 Gram(s) IV Push once  dextrose Oral Gel 15 Gram(s) Oral once PRN  enoxaparin Injectable 40 milliGRAM(s) SubCutaneous every 24 hours  ertapenem  IVPB 1000 milliGRAM(s) IV Intermittent every 24 hours  glucagon  Injectable 1 milliGRAM(s) IntraMuscular once  HYDROmorphone  Injectable 0.5 milliGRAM(s) IV Push every 4 hours PRN  influenza   Vaccine 0.5 milliLiter(s) IntraMuscular once  insulin lispro (ADMELOG) corrective regimen sliding scale   SubCutaneous three times a day before meals  insulin lispro (ADMELOG) corrective regimen sliding scale   SubCutaneous at bedtime  oxycodone    5 mG/acetaminophen 325 mG 1 Tablet(s) Oral every 4 hours PRN    Pertinent labs/Imaging    eGFR: 69 mL/min/1.73m2 (04-15-24 @ 07:08)  eGFR: 81 mL/min/1.73m2 (04-14-24 @ 11:23)

## 2024-04-15 NOTE — PROGRESS NOTE ADULT - SUBJECTIVE AND OBJECTIVE BOX
DATE OF SERVICE: 04-15-24 @ 15:36    Patient is a 59y old  Male who presents with a chief complaint of Sent to the ER by PCP following concern for LEFT foot infection (15 Apr 2024 09:35)      INTERVAL HISTORY: No acute events    REVIEW OF SYSTEMS:  CONSTITUTIONAL: No weakness  EYES/ENT: No visual changes;  No throat pain   NECK: No pain or stiffness  RESPIRATORY: No cough, wheezing; No shortness of breath  CARDIOVASCULAR: No chest pain or palpitations  GASTROINTESTINAL: No abdominal  pain. No nausea, vomiting, or hematemesis  GENITOURINARY: No dysuria, frequency or hematuria  NEUROLOGICAL: No stroke like symptoms  SKIN: No rashes      MEDICATIONS:        PHYSICAL EXAM:  T(C): 36.6 (04-15-24 @ 12:31), Max: 36.8 (04-14-24 @ 20:29)  HR: 76 (04-15-24 @ 12:31) (67 - 76)  BP: 118/69 (04-15-24 @ 12:31) (115/78 - 129/78)  RR: 18 (04-15-24 @ 12:31) (18 - 18)  SpO2: 96% (04-15-24 @ 12:31) (96% - 98%)  Wt(kg): --  I&O's Summary    14 Apr 2024 07:01  -  15 Apr 2024 07:00  --------------------------------------------------------  IN: 50 mL / OUT: 450 mL / NET: -400 mL    15 Apr 2024 07:01  -  15 Apr 2024 15:36  --------------------------------------------------------  IN: 240 mL / OUT: 0 mL / NET: 240 mL          Appearance: In no distress	  HEENT:    PERRL, EOMI	  Cardiovascular:  S1 S2, No JVD  Respiratory: Lungs clear to auscultation	  Gastrointestinal:  Soft, Non-tender, + BS	  Vascularature:  No edema of LE  Psychiatric: Appropriate affect   Neuro: no acute focal deficits                               10.2   8.52  )-----------( 525      ( 15 Apr 2024 07:08 )             30.2     04-15    137  |  102  |  28<H>  ----------------------------<  176<H>  4.9   |  24  |  1.22    Ca    9.9      15 Apr 2024 07:08  Phos  3.9     04-15  Mg     1.9     04-15    TPro  6.9  /  Alb  3.1<L>  /  TBili  0.2  /  DBili  x   /  AST  11  /  ALT  18  /  AlkPhos  79  04-14        Labs personally reviewed      ASSESSMENT/PLAN: 	  This is a 59 year old male PMH: Colon ca, Tobacco dependance.  Admitted for left foot pain. + Ulcer      1. Preop Risk Stratification - TTE unremarkable with preserved EF  - EKG non ischemic   - Reports good functional capacity prior to diabetic foot ulcer  - Elevated risk for mod risk LE angio and pods surgery, no contraindication to proceed  - s/p peripheral angio with BMS placement.   - L foot 5th metatarsal head resection with graft application   - Tolerated surgery well.  - Return to OR for left foot partial fifth ray resection, wound debridement and graft application today 4/12 at 1pm with Dr. Call   -  s/p left foot wound debridement w kerecis micro graft. Tolerated well.       2.   Diabetic foot ulcer  - MRI foot  suggestive of early osteomyelitis   - C/w local wound care per Pod  - Abx as per primary team  - POD following-> s/p left foot fifth metatarsal head resection, open 4/10  - Plan for left foot partial fifth ray resection, wound debridement and graft application today 4/12 at 1pm with Dr. Call   --  s/p left foot wound debridement w kerecis micro graft. Tolerated well.     3.  Dm2:  - HgbA1c 6.8  - ISS as per primary team    4 DVT ppx:  - Lovenox sq- on hold for Sx          JUDSON Enriquez, DO [facility]  Cardiovascular Medicine  [address], [address]  Available via call or text on [employer]  Office: [phone]

## 2024-04-15 NOTE — PROGRESS NOTE ADULT - NS ATTEND AMEND GEN_ALL_CORE FT
Patient care and plan discussed and reviewed with Advanced Care Provider. Plan as outlined above edited by me to reflect our discussion. Patient care and plan discussed and reviewed with Advanced Care Provider. Plan as outlined above edited by me to reflect our discussion. Thirty five minutes spent on encounter, of which more than fifty percent of the encounter was spent on counseling and/or coordinating care by the attending physician.

## 2024-04-15 NOTE — PROGRESS NOTE ADULT - SUBJECTIVE AND OBJECTIVE BOX
[facility] HEMATOLOGY/ONCOLOGY INPATIENT PROGRESS NOTE     Interval Hx:   04-15-24: Mr. Menon was seen at bedside today.    Meds:   MEDICATIONS  (STANDING):  aspirin  chewable 81 milliGRAM(s) Oral daily  chlorhexidine 4% Liquid 1 Application(s) Topical daily  dextrose 10% Bolus 125 milliLiter(s) IV Bolus once  dextrose 5%. 1000 milliLiter(s) (50 mL/Hr) IV Continuous <Continuous>  dextrose 5%. 1000 milliLiter(s) (100 mL/Hr) IV Continuous <Continuous>  dextrose 50% Injectable 25 Gram(s) IV Push once  dextrose 50% Injectable 12.5 Gram(s) IV Push once  ertapenem  IVPB 1000 milliGRAM(s) IV Intermittent every 24 hours  glucagon  Injectable 1 milliGRAM(s) IntraMuscular once  influenza   Vaccine 0.5 milliLiter(s) IntraMuscular once  insulin lispro (ADMELOG) corrective regimen sliding scale   SubCutaneous at bedtime  insulin lispro (ADMELOG) corrective regimen sliding scale   SubCutaneous three times a day before meals    MEDICATIONS  (PRN):  acetaminophen     Tablet .. 650 milliGRAM(s) Oral every 6 hours PRN Mild Pain (1 - 3)  dextrose Oral Gel 15 Gram(s) Oral once PRN Blood Glucose LESS THAN 70 milliGRAM(s)/deciliter  HYDROmorphone  Injectable 0.5 milliGRAM(s) IV Push every 4 hours PRN Severe Pain (7 - 10)  oxycodone    5 mG/acetaminophen 325 mG 1 Tablet(s) Oral every 4 hours PRN Moderate Pain (4 - 6)    Vital Signs Last 24 Hrs  T(C): 36.6 (15 Apr 2024 05:10), Max: 36.8 (14 Apr 2024 20:29)  T(F): 97.8 (15 Apr 2024 05:10), Max: 98.2 (14 Apr 2024 20:29)  HR: 73 (15 Apr 2024 05:10) (67 - 73)  BP: 115/78 (15 Apr 2024 05:10) (105/67 - 129/78)  BP(mean): --  RR: 18 (15 Apr 2024 05:10) (18 - 18)  SpO2: 98% (15 Apr 2024 05:10) (95% - 99%)    Parameters below as of 15 Apr 2024 05:10  Patient On (Oxygen Delivery Method): room air    Physical Exam:  Gen: NAD  HEENT: EOMI, MMM  Chest: equal chest rise, speaking full sentences   Cardiac: RR  Abd: Nondistended  Ext: LLE/foot cellulitis now surgical  Neuro: AAOx3, normal mood and affect     Labs:                        10.1   9.91  )-----------( 520      ( 14 Apr 2024 11:25 )             30.4     CBC Full  -  ( 14 Apr 2024 11:25 )  WBC Count : 9.91 K/uL  RBC Count : 3.68 M/uL  Hemoglobin : 10.1 g/dL  Hematocrit : 30.4 %  Platelet Count - Automated : 520 K/uL  Mean Cell Volume : 82.6 fl  Mean Cell Hemoglobin : 27.4 pg  Mean Cell Hemoglobin Concentration : 33.2 gm/dL  Auto Neutrophil # : 6.84 K/uL  Auto Lymphocyte # : 2.22 K/uL  Auto Monocyte # : 0.47 K/uL  Auto Eosinophil # : 0.24 K/uL  Auto Basophil # : 0.06 K/uL  Auto Neutrophil % : 69.1 %  Auto Lymphocyte % : 22.4 %  Auto Monocyte % : 4.7 %  Auto Eosinophil % : 2.4 %  Auto Basophil % : 0.6 %    04-14    135  |  100  |  23  ----------------------------<  254<H>  4.7   |  26  |  1.06    Ca    9.8      14 Apr 2024 11:23  Phos  3.4     04-14  Mg     1.8     04-14    TPro  6.9  /  Alb  3.1<L>  /  TBili  0.2  /  DBili  x   /  AST  11  /  ALT  18  /  AlkPhos  79  04-14      Bilirubin Total: 0.2 mg/dL (04-14-24 @ 11:23)   [facility] HEMATOLOGY/ONCOLOGY INPATIENT PROGRESS NOTE     Interval Hx:   04-15-24: Mr. Menon was seen at bedside today, s/p LUE PICC line 04/14/2024, no acute complaints today or overnight events noted, aware to follow up with Dr. Rock post discharge    Meds:   MEDICATIONS  (STANDING):  aspirin  chewable 81 milliGRAM(s) Oral daily  chlorhexidine 4% Liquid 1 Application(s) Topical daily  dextrose 10% Bolus 125 milliLiter(s) IV Bolus once  dextrose 5%. 1000 milliLiter(s) (50 mL/Hr) IV Continuous <Continuous>  dextrose 5%. 1000 milliLiter(s) (100 mL/Hr) IV Continuous <Continuous>  dextrose 50% Injectable 25 Gram(s) IV Push once  dextrose 50% Injectable 12.5 Gram(s) IV Push once  ertapenem  IVPB 1000 milliGRAM(s) IV Intermittent every 24 hours  glucagon  Injectable 1 milliGRAM(s) IntraMuscular once  influenza   Vaccine 0.5 milliLiter(s) IntraMuscular once  insulin lispro (ADMELOG) corrective regimen sliding scale   SubCutaneous at bedtime  insulin lispro (ADMELOG) corrective regimen sliding scale   SubCutaneous three times a day before meals    MEDICATIONS  (PRN):  acetaminophen     Tablet .. 650 milliGRAM(s) Oral every 6 hours PRN Mild Pain (1 - 3)  dextrose Oral Gel 15 Gram(s) Oral once PRN Blood Glucose LESS THAN 70 milliGRAM(s)/deciliter  HYDROmorphone  Injectable 0.5 milliGRAM(s) IV Push every 4 hours PRN Severe Pain (7 - 10)  oxycodone    5 mG/acetaminophen 325 mG 1 Tablet(s) Oral every 4 hours PRN Moderate Pain (4 - 6)    Vital Signs Last 24 Hrs  T(C): 36.6 (15 Apr 2024 05:10), Max: 36.8 (14 Apr 2024 20:29)  T(F): 97.8 (15 Apr 2024 05:10), Max: 98.2 (14 Apr 2024 20:29)  HR: 73 (15 Apr 2024 05:10) (67 - 73)  BP: 115/78 (15 Apr 2024 05:10) (105/67 - 129/78)  BP(mean): --  RR: 18 (15 Apr 2024 05:10) (18 - 18)  SpO2: 98% (15 Apr 2024 05:10) (95% - 99%)    Parameters below as of 15 Apr 2024 05:10  Patient On (Oxygen Delivery Method): room air    Physical Exam:  Gen: NAD  HEENT: EOMI, MMM  Chest: equal chest rise, speaking full sentences   Cardiac: RR  Abd: Nondistended  Ext: LLE/foot cellulitis now surgical  Neuro: AAOx3, normal mood and affect     Labs:                        10.1   9.91  )-----------( 520      ( 14 Apr 2024 11:25 )             30.4     CBC Full  -  ( 14 Apr 2024 11:25 )  WBC Count : 9.91 K/uL  RBC Count : 3.68 M/uL  Hemoglobin : 10.1 g/dL  Hematocrit : 30.4 %  Platelet Count - Automated : 520 K/uL  Mean Cell Volume : 82.6 fl  Mean Cell Hemoglobin : 27.4 pg  Mean Cell Hemoglobin Concentration : 33.2 gm/dL  Auto Neutrophil # : 6.84 K/uL  Auto Lymphocyte # : 2.22 K/uL  Auto Monocyte # : 0.47 K/uL  Auto Eosinophil # : 0.24 K/uL  Auto Basophil # : 0.06 K/uL  Auto Neutrophil % : 69.1 %  Auto Lymphocyte % : 22.4 %  Auto Monocyte % : 4.7 %  Auto Eosinophil % : 2.4 %  Auto Basophil % : 0.6 %    04-14    135  |  100  |  23  ----------------------------<  254<H>  4.7   |  26  |  1.06    Ca    9.8      14 Apr 2024 11:23  Phos  3.4     04-14  Mg     1.8     04-14    TPro  6.9  /  Alb  3.1<L>  /  TBili  0.2  /  DBili  x   /  AST  11  /  ALT  18  /  AlkPhos  79  04-14      Bilirubin Total: 0.2 mg/dL (04-14-24 @ 11:23)

## 2024-04-15 NOTE — PROGRESS NOTE ADULT - ASSESSMENT
Patient is a 59 year old male with PMH of stage IV colon cancer, s/p colectomy with ostomy 1/2024, peripheral neuropathy sent in from his podiatrist office with concern for osteomyelitis of the left foot.     L foot submet 5th wound with suspected early OM  Leukocytosis likely due to above and reactive postop  - s/p L foot wound explored by Podiatry - 1cm incision proximal to wound down to level of subq, mild serous drainage  - L foot Wcx - polymicrobial - Kleb oxytoca/Roultella ornithinolytica; Strep mitis/oralis grp, Peptoniphilus harei grp, Staph pettenkoferi  - MRI L foot suggestive of early OM at 5th metatarsal head and base of proximal phalanx, deep to soft tissue wound  - elevated ESR/CRP (99/140) on 4/2   - MRSA PCR screen negative, s/p vancomycin   - 4/8 s/p LLE angiogram, balloon angioplasty and stent to SFA  - 4/10 s/p L foot 5th met head resection, open    -- per brief op note - bone at proximal resection and biopsy was soft and bad quality, noted with necrotic wound 3.25x2.5cm with evidence of soft tissue infection and 3cc pus expressed -- high concern for residual infection   -- OR culture with Finegoldia magna   - 4/12 s/p I&D with skin graft application   -- per brief op note - no purulence; high concern for viability, low concern for residual bone or soft tissue infection    -- OR culture with NGTD   - afebrile, WBC normalized    4/14 s/p LUE PICC line   S/p zosyn 4/5-4/13    Recommendations:   Follow surg path reports   Continue Ertapenem 1g IV Q24h (for ease of dosing) to complete 6 week course on 5/23/24  - Will need weekly labs CBC/CMP/ESR/CRP while on IV antibiotics - fax  results to [phone].  - Patient will follow up with us in ID office within 1-2 weeks of discharge -- office info provided   Continue local wound care     Stable from ID standpoint at this time.  Discharge planning per primary team.       Cherelle Horvath M.D.  [facility], Division of Infectious Diseases  [phone]  After 5pm on weekdays and all day on weekends - please call [phone]

## 2024-04-15 NOTE — PROGRESS NOTE ADULT - PROBLEM SELECTOR PLAN 1
In setting of diabetic foot wound. MRI demonstrating early osteomyelitis of L foot 5th metatarsal head and base of proximal phalanx.  - DAMIEN demonstrating LLE PAD  - BCx: NGTD  - Wound Cx: Klebsiella   - Podiatry following: OR for L foot 5th metatarsal head resection with graft application 4/10 and 4/12  - Vascular surgery following: s/p LLE angiogram  - ID following: Ertapenem 1g IV Q24h to complete 6 week course on 5/23/24   - PICC Line placed 4/13  - ctm fever curve and white count   - dispo pending wound vac auth

## 2024-04-15 NOTE — PROGRESS NOTE ADULT - ASSESSMENT
Mr. Menon is a 59 year old male active smoker with PMHx of  peripheral neuropathy, Type II DM,  anemia,  status post colectomy with colostomy 01/2024  due to rectal cancer under the care of Dr. Renny Purvis now admitted with osteomyelitis of the left foot.  MRI foot shows Findings suggestive of early osteomyelitis at the fifth metatarsal head and base of the proximal phalanx, deep to the soft tissue wound. Focal osteitis is also a differential consideration for the osseous findings. Podiatry pending resection with graft. Infectious disease consulted.    Labs show hemoglobin 11.6, hematocrit  34.7, MCV 83.6 platelet count 339, WBC 12.14 with neutrophilic predominance. Creatinine 1.12 with normal liver function.  HbA1c 6.8.  TTE with EF 69%.     Patient last seen in Hematology Oncology Clinic 04/02/2024  and oxaliplatin was discontinued due to peripheral neuropathy. Podiatry scheduled pt for left foot fifth metatarsal head resection with wound debridement and graft application 04/04/2024, DAMIEN concerning, vascular performed LLE angio, balloon angioplasty and stent to SFA 04/08/2024.     04/11/2024: s/p 5th metatarsal head and neck resection with podiatry, pending return to OR with podiatry today, noted Endo recs, counseled pt on need for glycemic control including for acute wound healing, but also chronically for optimal medical optimization given on going need for antineoplastic therapy, he was frustrated and would like to re-visit topic at later date, stated he believes insulin makes his blood sugars higher, delineated mechanism of therapy but continued frustration, will continue to  as appropriate.     04/12/2024:  tolerated return to OR well 04/12/2024 with podiatry, concern for ongoing viability given prolonged capillary refill time     # Stage IV Rectal Adenocarcinoma   - Diagnosed 08/2023  - Invasive, moderately differentiated colonic adenocarcinoma  - Low probability of MSI-high  - HER-2-Mary Negative  - KRAS is Wild Type  - Metastatic to liver  - FOLFOX and weekly Cetuximab started 9/18/23  - PET-CT from 03/20/2024 shows that the patient is status post surgery with no abnormal FDG uptake in the pelvis postsurgical area.  More prominent uptake in the gallbladder fossa (early recurrence versus normal fluctuation of uptake ) and no other FDG avid lesion in the field-of-view.  Previously noted liver Mets are not presently seen.  - Was scheduled to receive 5FU, Leucovorin and Cetuximab 04/02/2024, therapy held due to concern for L foot infection and pt sent to Podiatry   - Plan to continue therapy once able to from infection standpoint, pt is aware     # Left Foot Osteomyelitis  # PVD  - Vascular surgery DAMIEN suggestive of moderate arterial flow limitation in the left lower extremity localizing to the iliofemoral distribution  --DAMIEN LLE with 0.65 indicating moderate lower extremity arterial vascular disease  - LLE angio, balloon angioplasty and stent to SFA 04/08/2024]  - Podiatry s/p left foot fifth metatarsal head/neck resection with wound debridement and graft application, 04/11/2024   -  tolerated return to OR well 04/12/2024 with podiatry, concern for ongoing viability given prolonged capillary refill time   - Antibiotics per ID and primary team, PICC line for Abx     # Neutrophilia   # Thrombocytosis   - Likely due to infection  - Outpatient labs 04/02/2024 WBC 16.19 ANC 14,170   - Continue to monitor, improving     # Normocytic anemia  - Likely multifactorial, active infection, cancer, chemotherapy  - Outpatient labs 04/02/2024 Hb 13.7, MCV 83.5   - Continue to monitor  - Transfuse to maintain Hb > 7     # Nicotine dependence, active cigarette smoker  - Counseling provided, pt aware of possible compromised wound healing     # Type II DM  - HBA1c 6.8  - inpatient Hyperglycemia noted  - Endocrinology following  - Counseling provided daily       Thank you for allowing me to participate in the care of Mr. Menon, please do not hesitate to call or text me if you have further questions or concerns.     Gato Horvath MD  [facility]   Division of Hematology/Oncology  [address] [place] [address], [address]  [place] [address]  P: [phone]  F: [phone]    Attestation:    ----Pt evaluated including face-to-face interaction in addition to chart review, reviewing treatment plan, and managing the patient’s chronic diagnoses as listed in the assessment----

## 2024-04-15 NOTE — PROGRESS NOTE ADULT - SUBJECTIVE AND OBJECTIVE BOX
Angus Ramirezwill| PGY-1  Internal Medicine    OVERNIGHT EVENTS: no overnight events      SUBJECTIVE: Patient was examined at bedside this morning. Appeared comfortable. Overnight vitals and monitoring results were reviewed. Reporting no complaints. Pt states he wants to go home. Denied chest pain, SOB, abdominal pain, vomiting, diarrhea, constipation.       MEDICATIONS  (STANDING):  aspirin  chewable 81 milliGRAM(s) Oral daily  chlorhexidine 4% Liquid 1 Application(s) Topical daily  clopidogrel Tablet 75 milliGRAM(s) Oral daily  dextrose 10% Bolus 125 milliLiter(s) IV Bolus once  dextrose 5%. 1000 milliLiter(s) (50 mL/Hr) IV Continuous <Continuous>  dextrose 5%. 1000 milliLiter(s) (100 mL/Hr) IV Continuous <Continuous>  dextrose 50% Injectable 12.5 Gram(s) IV Push once  dextrose 50% Injectable 25 Gram(s) IV Push once  enoxaparin Injectable 40 milliGRAM(s) SubCutaneous every 24 hours  ertapenem  IVPB 1000 milliGRAM(s) IV Intermittent every 24 hours  glucagon  Injectable 1 milliGRAM(s) IntraMuscular once  influenza   Vaccine 0.5 milliLiter(s) IntraMuscular once  insulin lispro (ADMELOG) corrective regimen sliding scale   SubCutaneous at bedtime  insulin lispro (ADMELOG) corrective regimen sliding scale   SubCutaneous three times a day before meals    MEDICATIONS  (PRN):  acetaminophen     Tablet .. 650 milliGRAM(s) Oral every 6 hours PRN Mild Pain (1 - 3)  dextrose Oral Gel 15 Gram(s) Oral once PRN Blood Glucose LESS THAN 70 milliGRAM(s)/deciliter  HYDROmorphone  Injectable 0.5 milliGRAM(s) IV Push every 4 hours PRN Severe Pain (7 - 10)  oxycodone    5 mG/acetaminophen 325 mG 1 Tablet(s) Oral every 4 hours PRN Moderate Pain (4 - 6)        T(F): 98.1 (04-15-24 @ 21:07), Max: 98.1 (04-15-24 @ 21:07)  HR: 69 (04-15-24 @ 21:07) (69 - 76)  BP: 123/76 (04-15-24 @ 21:07) (115/78 - 123/76)  BP(mean): --  RR: 18 (04-15-24 @ 21:07) (18 - 18)  SpO2: 96% (04-15-24 @ 12:31) (96% - 98%)    PHYSICAL EXAM:     GENERAL: NAD, lying in bed comfortably  HEAD:  Atraumatic, Normocephalic  EYES: EOMI, PERRLA, conjunctiva and sclera clear, no nystagmus noted  ENT: Moist mucous membranes,   NECK: Supple, No JVD, trachea midline  CHEST/LUNG: Clear to auscultation bilaterally; No rales, rhonchi, wheezing, or rubs. Unlabored respirations  HEART: Regular rate and rhythm; No murmurs, rubs, or gallops, normal S1/S2  ABDOMEN: normal bowel sounds; Soft, nontender, nondistended, no organomegaly   EXTREMITIES:  2+ Peripheral Pulses, brisk capillary refill. No clubbing, cyanosis, or edema  MSK: No gross deformities noted   Neurological:  A&Ox3, no focal deficits   SKIN: No rashes or lesions  PSYCH: Normal mood, affect     TELEMETRY:    LABS:                        10.2   8.52  )-----------( 525      ( 15 Apr 2024 07:08 )             30.2     04-15    137  |  102  |  28<H>  ----------------------------<  176<H>  4.9   |  24  |  1.22    Ca    9.9      15 Apr 2024 07:08  Phos  3.9     04-15  Mg     1.9     04-15    TPro  6.9  /  Alb  3.1<L>  /  TBili  0.2  /  DBili  x   /  AST  11  /  ALT  18  /  AlkPhos  79  04-14            Creatinine Trend: 1.22<--, 1.06<--, 1.36<--, 1.11<--, 1.14<--, 1.09<--  I&O's Summary    14 Apr 2024 07:01  -  15 Apr 2024 07:00  --------------------------------------------------------  IN: 50 mL / OUT: 450 mL / NET: -400 mL    15 Apr 2024 07:01  -  15 Apr 2024 21:44  --------------------------------------------------------  IN: 240 mL / OUT: 0 mL / NET: 240 mL      BNP    RADIOLOGY & ADDITIONAL STUDIES:

## 2024-04-15 NOTE — PROGRESS NOTE ADULT - ASSESSMENT
Patient is a 59y Male active smoker with a hx of Stage IV rectal adenocarcinoma s/p colectomy with colostomy (1/2024) and T2DM c/b peripheral neuropathy who presented from PCP concerning for L foot infection, found to have L fifth metatarsal osteomyelitis. OR with podiatry pending vascular surgery. LLE angiogram with possible stent scheduled for 4/8. L foot 5th metatarsal head resection with graft application scheduled for 4/10.    PLANS:    # Osteomyelitis:   - In setting of diabetic foot wound. MRI demonstrating early osteomyelitis of L foot 5th metatarsal head and base of proximal phalanx.  - DAMIEN demonstrating LLE PAD  - BCx: NGTD  - Wound Cx: Klebsiella   - Podiatry following: sp OR for L foot 5th metatarsal head resection with graft application 4/10, S/p RTO 4/12 for left foot partial 5th ray resection with wound debridement and Kerecis graft application with Dr. Call  - Vascular surgery following: LLE angiogram balloon angioplasty and stent to SFA on 4/8  - ID following: C/w IV abx  - PICC placed 4/14    # Rectal adenocarcinoma:  - Stage IV Rectal Adenocarcinoma with metastasis to liver diagnosed 08/2023. Follows with Dr. Renny Purvis.  - Oncology following  - Chemotherapy held in setting of infection  - Follow-up outpatient.  - Heme/ Onc following    # DM2:  - HgbA1c 6.8%  - Continue to monitor blood glucose levels  - Sliding Scale    # DVT ppx:  - Lovenox sq    DC planning    [facility]  [phone]

## 2024-04-15 NOTE — PROGRESS NOTE ADULT - SUBJECTIVE AND OBJECTIVE BOX
Podiatry pager #: [phone] ([place])/ [phone] ([facility])    Patient is a 59y old  Male who presents with a chief complaint of Sent to the ER by PCP following concern for LEFT foot infection (15 Apr 2024 08:42)       INTERVAL HPI/OVERNIGHT EVENTS:  Patient seen and evaluated at bedside.  Pt is resting comfortable in NAD. Denies N/V/F/C.     Allergies    No Known Allergies    Intolerances        Vital Signs Last 24 Hrs  T(C): 36.6 (15 Apr 2024 05:10), Max: 36.8 (14 Apr 2024 20:29)  T(F): 97.8 (15 Apr 2024 05:10), Max: 98.2 (14 Apr 2024 20:29)  HR: 73 (15 Apr 2024 05:10) (67 - 73)  BP: 115/78 (15 Apr 2024 05:10) (105/67 - 129/78)  BP(mean): --  RR: 18 (15 Apr 2024 05:10) (18 - 18)  SpO2: 98% (15 Apr 2024 05:10) (95% - 99%)    Parameters below as of 15 Apr 2024 05:10  Patient On (Oxygen Delivery Method): room air        LABS:                        10.2   8.52  )-----------( 525      ( 15 Apr 2024 07:08 )             30.2     04-15    137  |  102  |  28<H>  ----------------------------<  176<H>  4.9   |  24  |  1.22    Ca    9.9      15 Apr 2024 07:08  Phos  3.9     04-15  Mg     1.9     04-15    TPro  6.9  /  Alb  3.1<L>  /  TBili  0.2  /  DBili  x   /  AST  11  /  ALT  18  /  AlkPhos  79  04-14      Urinalysis Basic - ( 15 Apr 2024 07:08 )    Color: x / Appearance: x / SG: x / pH: x  Gluc: 176 mg/dL / Ketone: x  / Bili: x / Urobili: x   Blood: x / Protein: x / Nitrite: x   Leuk Esterase: x / RBC: x / WBC x   Sq Epi: x / Non Sq Epi: x / Bacteria: x      CAPILLARY BLOOD GLUCOSE          Lower Extremity Physical Exam:  Vascular: DP/PT 2/4, B/L, CFT <3  seconds B/L, Temperature gradient warm to cool B/L.   Neuro: Epicritic sensation diminished to the level of digits, B/L.  Musculoskeletal/Ortho: unremarkable   Skin: 4/12 s/p left foot wound debridement w kerecis micro graft: staples/adaptic and graft intact, no drainage, 5th toe slightly dusky but warm to touch.  RADIOLOGY & ADDITIONAL TESTS:

## 2024-04-15 NOTE — PROGRESS NOTE ADULT - ASSESSMENT
59y old Male with history of T2DM not on meds, Colon cancer s/p colectomy and colostomy and peripheral neuropathy here with acute osteomyelitis of left metatarsal s/p resection on 4/10.     1. T2DM with hyperglycemia  - No recent FS  - Serum glucose this morning slightly lower  - Monitor FS as the patient allows    2. Left 5th metatarsal osteomyelitis s/p amputation  - podiatry following, s/p graft placement  - on Ertapenem, ID following    Will continue to follow.    Catracho Mooney MD  [facility]- Division of Endocrinology    [address] [place] [address]    T [phone]  F [phone]

## 2024-04-15 NOTE — PROGRESS NOTE ADULT - ASSESSMENT
59M  s/p left foot wound debridement with kerecis micro graft josette (DOS 4/12/24)  - Pt was seen and evaluated.   - Afebrile, no leukocytosis   - 4/12 s/p left foot wound debridement w kerecis micro graft: staples/adaptic and graft intact, no drainage, 5th toe slightly dusky but warm to touch.  - Vac ok to be re-applied today  - 4/12 Intra-op findings: low concern for residual infection, high concern for viability  - 4/12 OR clean bone : no growth   - Stability for discharge from a podiatry standpoint pending final ID and vasc recs  - Discussed with attending

## 2024-04-15 NOTE — PROGRESS NOTE ADULT - SUBJECTIVE AND OBJECTIVE BOX
[facility] DIVISION OF INFECTIOUS DISEASES  REGIS Khan Y. Patel, S. Shah, G. Casimir  [phone]  ([phone] - weekdays after 5pm and weekends)    Name: EDA CHAWLA  Age/Gender: 59y Male  MRN: [id number]    Interval History:  Patient seen and examined this morning.   Resting comfortably, no new complaints.   Notes reviewed  No concerning overnight events  Afebrile   Allergies: No Known Allergies      Objective:  Vitals:   T(F): 97.9 (04-15-24 @ 12:31), Max: 98.2 (04-14-24 @ 20:29)  HR: 76 (04-15-24 @ 12:31) (67 - 76)  BP: 118/69 (04-15-24 @ 12:31) (105/67 - 129/78)  RR: 18 (04-15-24 @ 12:31) (18 - 18)  SpO2: 96% (04-15-24 @ 12:31) (95% - 99%)  Physical Examination:  General: no acute distress, nontoxic appearing   HEENT: NC/AT, anicteric, neck supple  Respiratory: no acc muscle use, breathing comfortably  Cardiovascular: S1 and S2 present  Gastrointestinal: normal appearing, nondistended  Extremities: L foot clean dressing, no edema  Skin: no visible rash; LUE PICC     Laboratory Studies:  CBC:                       10.2   8.52  )-----------( 525      ( 15 Apr 2024 07:08 )             30.2     WBC Trend:  8.52 04-15-24 @ 07:08  9.91 04-14-24 @ 11:25  9.63 04-13-24 @ 07:05  10.35 04-12-24 @ 06:40  11.90 04-11-24 @ 07:42  13.33 04-10-24 @ 06:34  13.41 04-09-24 @ 09:33    CMP: 04-15    137  |  102  |  28<H>  ----------------------------<  176<H>  4.9   |  24  |  1.22    Ca    9.9      15 Apr 2024 07:08  Phos  3.9     04-15  Mg     1.9     04-15    TPro  6.9  /  Alb  3.1<L>  /  TBili  0.2  /  DBili  x   /  AST  11  /  ALT  18  /  AlkPhos  79  04-14    Creatinine: 1.22 mg/dL (04-15-24 @ 07:08)  Creatinine: 1.06 mg/dL (04-14-24 @ 11:23)  Creatinine: 1.36 mg/dL (04-13-24 @ 07:05)  Creatinine: 1.11 mg/dL (04-12-24 @ 06:40)  Creatinine: 1.14 mg/dL (04-11-24 @ 07:42)  Creatinine: 1.09 mg/dL (04-10-24 @ 06:34)  Creatinine: 1.07 mg/dL (04-09-24 @ 09:33)    LIVER FUNCTIONS - ( 14 Apr 2024 11:23 )  Alb: 3.1 g/dL / Pro: 6.9 g/dL / ALK PHOS: 79 U/L / ALT: 18 U/L / AST: 11 U/L / GGT: x           Microbiology: reviewed   Culture - Tissue with Gram Stain (collected 04-12-24 @ 22:47)  Source: .Tissue Left 5th Metatarsal  Gram Stain (04-13-24 @ 07:19):    Few polymorphonuclear leukocytes seen per low power field    No organisms seen per oil power field  Preliminary Report (04-13-24 @ 20:13):    No growth to date.    Culture - Abscess with Gram Stain (collected 04-10-24 @ 12:30)  Source: .Abscess left foot abscess.  Gram Stain (04-12-24 @ 21:47):    No polymorphonuclear cells seen per low power field    No organisms seen per oil power field  Final Report (04-12-24 @ 21:47):    Rare Finegoldia magna    Culture - Acid Fast - Other w/Smear (collected 04-10-24 @ 12:30)  Source: .Other left foot abscess.  Preliminary Report (04-13-24 @ 15:06):    Culture is being performed.    Culture - Fungal, Other (collected 04-10-24 @ 12:30)  Source: .Other left foot abscess.  Preliminary Report (04-13-24 @ 23:03):    Culture is being performed. Fungal cultures are held for 4 weeks.    Culture - Acid Fast - Tissue w/Smear (collected 04-10-24 @ 12:22)  Source: .Tissue  Preliminary Report (04-13-24 @ 15:06):    Culture is being performed.    Culture - Fungal, Tissue (collected 04-10-24 @ 12:22)  Source: .Tissue  Preliminary Report (04-13-24 @ 23:03):    Culture is being performed. Fungal cultures are held for 4 weeks.    Culture - Tissue with Gram Stain (collected 04-10-24 @ 12:22)  Source: .Tissue left 5th proximal phalanx  Gram Stain (04-10-24 @ 23:36):    No polymorphonuclear cells seen per low power field    No organisms seen per oil power field  Preliminary Report (04-11-24 @ 15:44):    No growth    Culture - Abscess with Gram Stain (collected 04-02-24 @ 17:40)  Source: .Abscess left foot  Gram Stain (04-03-24 @ 05:56):    No polymorphonuclear leukocytes seen per low power field    Numerous Gram positive cocci in pairs seen per oil power field  Final Report (04-04-24 @ 23:56):    Few Klebsiella oxytoca/Raoultella ornithinolytica    Few Streptococcus mitis/oralis group "Susceptibilities not performed"    Moderate Peptoniphilus harei group "Susceptibilities not performed"    Rare Staphylococcus pettenkoferi "Susceptibilities not performed"  Organism: Klebsiella oxytoca /Raoutella ornithinolytica (04-04-24 @ 23:56)  Organism: Klebsiella oxytoca /Raoutella ornithinolytica (04-04-24 @ 23:56)      Method Type: TARIQ      -  Amoxicillin/Clavulanic Acid: S <=8/4      -  Ampicillin: R >16 These ampicillin results predict results for amoxicillin      -  Ampicillin/Sulbactam: S 8/4      -  Aztreonam: S <=4      -  Cefazolin: R >16      -  Cefepime: S <=2      -  Cefoxitin: S <=8      -  Ceftriaxone: S <=1      -  Ciprofloxacin: S <=0.25      -  Ertapenem: S <=0.5      -  Gentamicin: S <=2      -  Imipenem: S <=1      -  Levofloxacin: S <=0.5      -  Meropenem: S <=1      -  Piperacillin/Tazobactam: S <=8      -  Tobramycin: S <=2      -  Trimethoprim/Sulfamethoxazole: S <=0.5/9.5    Culture - Blood (collected 04-02-24 @ 17:24)  Source: .Blood Blood-Peripheral  Final Report (04-07-24 @ 23:01):    No growth at 5 days    Culture - Blood (collected 04-02-24 @ 17:17)  Source: .Blood Blood-Peripheral  Final Report (04-07-24 @ 23:01):    No growth at 5 days          Radiology: reviewed     Medications:  acetaminophen     Tablet .. 650 milliGRAM(s) Oral every 6 hours PRN  aspirin  chewable 81 milliGRAM(s) Oral daily  chlorhexidine 4% Liquid 1 Application(s) Topical daily  clopidogrel Tablet 75 milliGRAM(s) Oral daily  dextrose 10% Bolus 125 milliLiter(s) IV Bolus once  dextrose 5%. 1000 milliLiter(s) IV Continuous <Continuous>  dextrose 5%. 1000 milliLiter(s) IV Continuous <Continuous>  dextrose 50% Injectable 12.5 Gram(s) IV Push once  dextrose 50% Injectable 25 Gram(s) IV Push once  dextrose Oral Gel 15 Gram(s) Oral once PRN  enoxaparin Injectable 40 milliGRAM(s) SubCutaneous every 24 hours  ertapenem  IVPB 1000 milliGRAM(s) IV Intermittent every 24 hours  glucagon  Injectable 1 milliGRAM(s) IntraMuscular once  HYDROmorphone  Injectable 0.5 milliGRAM(s) IV Push every 4 hours PRN  influenza   Vaccine 0.5 milliLiter(s) IntraMuscular once  insulin lispro (ADMELOG) corrective regimen sliding scale   SubCutaneous at bedtime  insulin lispro (ADMELOG) corrective regimen sliding scale   SubCutaneous three times a day before meals  oxycodone    5 mG/acetaminophen 325 mG 1 Tablet(s) Oral every 4 hours PRN    Current Antimicrobials:  ertapenem  IVPB 1000 milliGRAM(s) IV Intermittent every 24 hours    Prior/Completed Antimicrobials:  cefepime   IVPB  piperacillin/tazobactam IVPB.  piperacillin/tazobactam IVPB.-  vancomycin  IVPB.

## 2024-04-16 VITALS
TEMPERATURE: 98 F | SYSTOLIC BLOOD PRESSURE: 126 MMHG | HEART RATE: 67 BPM | OXYGEN SATURATION: 97 % | DIASTOLIC BLOOD PRESSURE: 72 MMHG | RESPIRATION RATE: 18 BRPM

## 2024-04-16 PROCEDURE — 37220: CPT

## 2024-04-16 PROCEDURE — 86900 BLOOD TYPING SEROLOGIC ABO: CPT

## 2024-04-16 PROCEDURE — 93923 UPR/LXTR ART STDY 3+ LVLS: CPT

## 2024-04-16 PROCEDURE — 87040 BLOOD CULTURE FOR BACTERIA: CPT

## 2024-04-16 PROCEDURE — 85027 COMPLETE CBC AUTOMATED: CPT

## 2024-04-16 PROCEDURE — C1887: CPT

## 2024-04-16 PROCEDURE — 85018 HEMOGLOBIN: CPT

## 2024-04-16 PROCEDURE — 75710 ARTERY X-RAYS ARM/LEG: CPT | Mod: 59

## 2024-04-16 PROCEDURE — C1725: CPT

## 2024-04-16 PROCEDURE — 86850 RBC ANTIBODY SCREEN: CPT

## 2024-04-16 PROCEDURE — 36415 COLL VENOUS BLD VENIPUNCTURE: CPT

## 2024-04-16 PROCEDURE — 82803 BLOOD GASES ANY COMBINATION: CPT

## 2024-04-16 PROCEDURE — 36569 INSJ PICC 5 YR+ W/O IMAGING: CPT

## 2024-04-16 PROCEDURE — 82330 ASSAY OF CALCIUM: CPT

## 2024-04-16 PROCEDURE — 85652 RBC SED RATE AUTOMATED: CPT

## 2024-04-16 PROCEDURE — 87206 SMEAR FLUORESCENT/ACID STAI: CPT

## 2024-04-16 PROCEDURE — 87205 SMEAR GRAM STAIN: CPT

## 2024-04-16 PROCEDURE — 73630 X-RAY EXAM OF FOOT: CPT

## 2024-04-16 PROCEDURE — C1876: CPT

## 2024-04-16 PROCEDURE — 82570 ASSAY OF URINE CREATININE: CPT

## 2024-04-16 PROCEDURE — 88304 TISSUE EXAM BY PATHOLOGIST: CPT

## 2024-04-16 PROCEDURE — 97116 GAIT TRAINING THERAPY: CPT

## 2024-04-16 PROCEDURE — 85025 COMPLETE CBC W/AUTO DIFF WBC: CPT

## 2024-04-16 PROCEDURE — 87077 CULTURE AEROBIC IDENTIFY: CPT

## 2024-04-16 PROCEDURE — 97161 PT EVAL LOW COMPLEX 20 MIN: CPT

## 2024-04-16 PROCEDURE — 97164 PT RE-EVAL EST PLAN CARE: CPT

## 2024-04-16 PROCEDURE — 85014 HEMATOCRIT: CPT

## 2024-04-16 PROCEDURE — 87075 CULTR BACTERIA EXCEPT BLOOD: CPT

## 2024-04-16 PROCEDURE — 81001 URINALYSIS AUTO W/SCOPE: CPT

## 2024-04-16 PROCEDURE — 93926 LOWER EXTREMITY STUDY: CPT

## 2024-04-16 PROCEDURE — 82947 ASSAY GLUCOSE BLOOD QUANT: CPT

## 2024-04-16 PROCEDURE — 88311 DECALCIFY TISSUE: CPT

## 2024-04-16 PROCEDURE — 83605 ASSAY OF LACTIC ACID: CPT

## 2024-04-16 PROCEDURE — 84132 ASSAY OF SERUM POTASSIUM: CPT

## 2024-04-16 PROCEDURE — C1894: CPT

## 2024-04-16 PROCEDURE — C1751: CPT

## 2024-04-16 PROCEDURE — 84295 ASSAY OF SERUM SODIUM: CPT

## 2024-04-16 PROCEDURE — 96374 THER/PROPH/DIAG INJ IV PUSH: CPT

## 2024-04-16 PROCEDURE — 86901 BLOOD TYPING SEROLOGIC RH(D): CPT

## 2024-04-16 PROCEDURE — 84100 ASSAY OF PHOSPHORUS: CPT

## 2024-04-16 PROCEDURE — 82435 ASSAY OF BLOOD CHLORIDE: CPT

## 2024-04-16 PROCEDURE — 73720 MRI LWR EXTREMITY W/O&W/DYE: CPT | Mod: MC

## 2024-04-16 PROCEDURE — 97605 NEG PRS WND THER DME<=50SQCM: CPT

## 2024-04-16 PROCEDURE — 99261: CPT

## 2024-04-16 PROCEDURE — 84156 ASSAY OF PROTEIN URINE: CPT

## 2024-04-16 PROCEDURE — 82962 GLUCOSE BLOOD TEST: CPT

## 2024-04-16 PROCEDURE — 87015 SPECIMEN INFECT AGNT CONCNTJ: CPT

## 2024-04-16 PROCEDURE — 87641 MR-STAPH DNA AMP PROBE: CPT

## 2024-04-16 PROCEDURE — 83735 ASSAY OF MAGNESIUM: CPT

## 2024-04-16 PROCEDURE — 71045 X-RAY EXAM CHEST 1 VIEW: CPT

## 2024-04-16 PROCEDURE — 87640 STAPH A DNA AMP PROBE: CPT

## 2024-04-16 PROCEDURE — 83036 HEMOGLOBIN GLYCOSYLATED A1C: CPT

## 2024-04-16 PROCEDURE — 93356 MYOCRD STRAIN IMG SPCKL TRCK: CPT

## 2024-04-16 PROCEDURE — 99285 EMERGENCY DEPT VISIT HI MDM: CPT

## 2024-04-16 PROCEDURE — C1769: CPT

## 2024-04-16 PROCEDURE — 85610 PROTHROMBIN TIME: CPT

## 2024-04-16 PROCEDURE — 37226: CPT

## 2024-04-16 PROCEDURE — 87070 CULTURE OTHR SPECIMN AEROBIC: CPT

## 2024-04-16 PROCEDURE — 80053 COMPREHEN METABOLIC PANEL: CPT

## 2024-04-16 PROCEDURE — 80202 ASSAY OF VANCOMYCIN: CPT

## 2024-04-16 PROCEDURE — 96375 TX/PRO/DX INJ NEW DRUG ADDON: CPT

## 2024-04-16 PROCEDURE — A9585: CPT

## 2024-04-16 PROCEDURE — 87102 FUNGUS ISOLATION CULTURE: CPT

## 2024-04-16 PROCEDURE — 87186 SC STD MICRODIL/AGAR DIL: CPT

## 2024-04-16 PROCEDURE — 87116 MYCOBACTERIA CULTURE: CPT

## 2024-04-16 PROCEDURE — 86140 C-REACTIVE PROTEIN: CPT

## 2024-04-16 PROCEDURE — 88305 TISSUE EXAM BY PATHOLOGIST: CPT

## 2024-04-16 PROCEDURE — C1760: CPT

## 2024-04-16 PROCEDURE — 85730 THROMBOPLASTIN TIME PARTIAL: CPT

## 2024-04-16 PROCEDURE — 93306 TTE W/DOPPLER COMPLETE: CPT

## 2024-04-16 PROCEDURE — 93005 ELECTROCARDIOGRAM TRACING: CPT

## 2024-04-16 PROCEDURE — 80048 BASIC METABOLIC PNL TOTAL CA: CPT

## 2024-04-16 RX ORDER — CLOPIDOGREL BISULFATE 75 MG/1
1 TABLET, FILM COATED ORAL
Qty: 30 | Refills: 0
Start: 2024-04-16 | End: 2024-05-15

## 2024-04-16 RX ORDER — ASPIRIN/CALCIUM CARB/MAGNESIUM 324 MG
1 TABLET ORAL
Qty: 30 | Refills: 0
Start: 2024-04-16 | End: 2024-05-15

## 2024-04-16 RX ADMIN — CHLORHEXIDINE GLUCONATE 1 APPLICATION(S): 213 SOLUTION TOPICAL at 11:35

## 2024-04-16 RX ADMIN — Medication 81 MILLIGRAM(S): at 11:27

## 2024-04-16 RX ADMIN — ENOXAPARIN SODIUM 40 MILLIGRAM(S): 100 INJECTION SUBCUTANEOUS at 11:27

## 2024-04-16 RX ADMIN — CLOPIDOGREL BISULFATE 75 MILLIGRAM(S): 75 TABLET, FILM COATED ORAL at 11:27

## 2024-04-16 RX ADMIN — ERTAPENEM SODIUM 120 MILLIGRAM(S): 1 INJECTION, POWDER, LYOPHILIZED, FOR SOLUTION INTRAMUSCULAR; INTRAVENOUS at 14:59

## 2024-04-16 NOTE — PROGRESS NOTE ADULT - REASON FOR ADMISSION
Sent to the ER by PCP following concern for LEFT foot infection

## 2024-04-16 NOTE — PROGRESS NOTE ADULT - ASSESSMENT
59M s/p left foot wound debridement with kerecis micro graft josette (DOS 4/12/24)  - Pt was seen and evaluated.   - Afebrile, no leukocytosis   - 4/12 s/p left foot wound debridement w kerecis micro graft: staples/adaptic and graft intact, no drainage, 5th toe slightly dusky but warm to touch. VAC kept CDI today   - Vac ok to be re-applied today  - 4/12 Intra-op findings: low concern for residual infection, high concern for viability  - 4/12 OR clean bone : no growth   - Stability for discharge from a podiatry standpoint pending final ID and vasc recs  - Seen with attending    59M s/p left foot wound debridement with kerecis micro graft josette (DOS 4/12/24)  - Pt was seen and evaluated.   - Afebrile, no leukocytosis   - 4/12 s/p left foot wound debridement w kerecis micro graft: staples/adaptic and graft intact, no drainage, 5th toe slightly dusky but warm to touch. VAC kept CDI today   - Vac ok to be re-applied today  - 4/12 Intra-op findings: low concern for residual infection, high concern for viability  - 4/12 OR clean bone : no growth   - ID recs, appreciated   - Vasc recs, appreciated   - Stability for discharge from a podiatry standpoint pending final ID and vasc recs  - Seen with attending

## 2024-04-16 NOTE — PROVIDER CONTACT NOTE (OTHER) - NAME OF MD/NP/PA/DO NOTIFIED:
Graeme Hoffman
Chetan Gibbs
Dr Spencer
Dr. Angus Gramajo
Dr. Hoffman
Dr. Pimentel
MD Robles Rodriguez
Robles Rodriguez
Tanner Pimentel

## 2024-04-16 NOTE — DIETITIAN INITIAL EVALUATION ADULT - OTHER INFO
Weights:  - UBW (per patient): ~180 pounds   - Dosing Weight (per chart): 184 pounds (4/12)  - Daily Weights (per flow sheets): 190.9 pounds (bed) (4/10)  - Bed Scale Weight (taken by RD): 189.64 pounds (4/16)  - Per [facility] HIE: 184.1 pounds (4/12/24), 184.1 pounds (4/10/24), 184 pounds (4/8/24), 183 pounds (4/2/24), 176.13 pounds (1/20/24), 200 pounds (1/1/24)  Pt reported stable body weight ~180 pounds prior to admission. RD to continue to monitor weights and trends as able.     Nutritional Concerns:  - Pt with osteomyelitis in setting of diabetic foot wound (per chart). Pt is s/p OR for L foot 5th metatarsal head resection with graft application on 4/10, and s/p left foot partial 5th ray resection with wound debridement and Kerecis graft application on 4/12 (per chart).   - Pt s/p LLE angiogram balloon angioplasty and stent on 4/8 (per chart).   - PICC placed 4/14 (per chart). Ordered for antibiotics in-house (per orders).   - Pt with Stage IV Rectal Adenocarcinoma (per oncology). Per chart, "S/P colon resection with ostomy." Therapy held due to infection (per chart).   - Pt with T2DM (per chart). A1C 6.8% (4/3) (per chart). Glucose 176 mg/dL (4/15), fingersticks elevated in-house. Pt noted to be previously declining fingersticks in-house (per chart). Ordered for Insulin Lispro (ADMELOG) Corrective Regimen Sliding Scale (per orders).

## 2024-04-16 NOTE — DIETITIAN INITIAL EVALUATION ADULT - PROBLEM SELECTOR PLAN 6
Transitions of Care Status:  1.  Name of PCP:    Dewayne Purvis MD (Oncology) [phone]  2.  PCP Contacted on Admission: [ ] Y    [x ] N    3.  PCP contacted at Discharge: [ ] Y    [ ] N    [ ] N/A  4.  Post-Discharge Appointment Date and Location:  5.  Summary of Handoff given to PCP:

## 2024-04-16 NOTE — PROGRESS NOTE ADULT - SUBJECTIVE AND OBJECTIVE BOX
[facility] Progress Note    MAR, chart notes, fingersticks and labs reviewed    Subjective: going home    Objective  Vital Signs  T(C): 36.4 (04-16-24 @ 11:33), Max: 36.7 (04-15-24 @ 21:07)  HR: 67 (04-16-24 @ 11:33) (67 - 69)  BP: 126/72 (04-16-24 @ 11:33) (118/77 - 126/72)  RR: 18 (04-16-24 @ 11:33) (18 - 18)  SpO2: 97% (04-16-24 @ 11:33) (97% - 98%)    Physical Exam  Gen: NAD, alert, awake  HEENT: NC/AT, EOMI  Neck: supple  Chest: breathing comfortably  Heart: +s1 +s2    Medications  acetaminophen     Tablet .. 650 milliGRAM(s) Oral every 6 hours PRN  aspirin  chewable 81 milliGRAM(s) Oral daily  chlorhexidine 4% Liquid 1 Application(s) Topical daily  clopidogrel Tablet 75 milliGRAM(s) Oral daily  dextrose 10% Bolus 125 milliLiter(s) IV Bolus once  dextrose 5%. 1000 milliLiter(s) IV Continuous <Continuous>  dextrose 5%. 1000 milliLiter(s) IV Continuous <Continuous>  dextrose 50% Injectable 12.5 Gram(s) IV Push once  dextrose 50% Injectable 25 Gram(s) IV Push once  dextrose Oral Gel 15 Gram(s) Oral once PRN  enoxaparin Injectable 40 milliGRAM(s) SubCutaneous every 24 hours  ertapenem  IVPB 1000 milliGRAM(s) IV Intermittent every 24 hours  glucagon  Injectable 1 milliGRAM(s) IntraMuscular once  HYDROmorphone  Injectable 0.5 milliGRAM(s) IV Push every 4 hours PRN  influenza   Vaccine 0.5 milliLiter(s) IntraMuscular once  insulin lispro (ADMELOG) corrective regimen sliding scale   SubCutaneous at bedtime  insulin lispro (ADMELOG) corrective regimen sliding scale   SubCutaneous three times a day before meals  oxycodone    5 mG/acetaminophen 325 mG 1 Tablet(s) Oral every 4 hours PRN    Pertinent labs/Imaging    eGFR: 69 mL/min/1.73m2 (04-15-24 @ 07:08)

## 2024-04-16 NOTE — DISCHARGE NOTE NURSING/CASE MANAGEMENT/SOCIAL WORK - PATIENT PORTAL LINK FT
You can access the FollowMyHealth Patient Portal offered by [facility] by registering at the following website: [email or web address]. By joining [facility]’s FollowMyHealth portal, you will also be able to view your health information using other applications (apps) compatible with our system.

## 2024-04-16 NOTE — PROVIDER CONTACT NOTE (OTHER) - REASON
Pt is refusing finger stick
Pt refused fingerstick coverage
DVT prevention
Patient refusing bedtime fingerstick
Refusing fingersticks
pt left foot resection had large amount of bleeding
Pt is refusing afternoon finger stick
Blood sugar
Pt refused fingersticks @ bedtime

## 2024-04-16 NOTE — PROGRESS NOTE ADULT - PROVIDER SPECIALTY LIST ADULT
Cardiology
Heme/Onc
Infectious Disease
Infectious Disease
Internal Medicine
Podiatry
Vascular Surgery
Anesthesia
Cardiology
Endocrinology
Endocrinology
Heme/Onc
Infectious Disease
Infectious Disease
Internal Medicine
Podiatry
Vascular Surgery
Cardiology
Endocrinology
Endocrinology
Heme/Onc
Infectious Disease
Infectious Disease
Internal Medicine
Podiatry
Vascular Surgery
Endocrinology
Heme/Onc
Heme/Onc
Infectious Disease
Internal Medicine
Podiatry
Podiatry
Vascular Surgery
Internal Medicine

## 2024-04-16 NOTE — DIETITIAN INITIAL EVALUATION ADULT - REASON INDICATOR FOR ASSESSMENT
RD assessment warranted for length of stay  Source: Patient, Electronic Medical Record  Chart reviewed, events noted.

## 2024-04-16 NOTE — PROGRESS NOTE ADULT - PROBLEM SELECTOR PLAN 5
DVT ppx: Lovenox 40mg daily  Diet: Consistent carb diet  Dispo: Home

## 2024-04-16 NOTE — DIETITIAN INITIAL EVALUATION ADULT - PERTINENT MEDS FT
MEDICATIONS  (STANDING):  aspirin  chewable 81 milliGRAM(s) Oral daily  chlorhexidine 4% Liquid 1 Application(s) Topical daily  clopidogrel Tablet 75 milliGRAM(s) Oral daily  dextrose 10% Bolus 125 milliLiter(s) IV Bolus once  dextrose 5%. 1000 milliLiter(s) (100 mL/Hr) IV Continuous <Continuous>  dextrose 5%. 1000 milliLiter(s) (50 mL/Hr) IV Continuous <Continuous>  dextrose 50% Injectable 12.5 Gram(s) IV Push once  dextrose 50% Injectable 25 Gram(s) IV Push once  enoxaparin Injectable 40 milliGRAM(s) SubCutaneous every 24 hours  ertapenem  IVPB 1000 milliGRAM(s) IV Intermittent every 24 hours  glucagon  Injectable 1 milliGRAM(s) IntraMuscular once  influenza   Vaccine 0.5 milliLiter(s) IntraMuscular once  insulin lispro (ADMELOG) corrective regimen sliding scale   SubCutaneous at bedtime  insulin lispro (ADMELOG) corrective regimen sliding scale   SubCutaneous three times a day before meals    MEDICATIONS  (PRN):  acetaminophen     Tablet .. 650 milliGRAM(s) Oral every 6 hours PRN Mild Pain (1 - 3)  dextrose Oral Gel 15 Gram(s) Oral once PRN Blood Glucose LESS THAN 70 milliGRAM(s)/deciliter  HYDROmorphone  Injectable 0.5 milliGRAM(s) IV Push every 4 hours PRN Severe Pain (7 - 10)  oxycodone    5 mG/acetaminophen 325 mG 1 Tablet(s) Oral every 4 hours PRN Moderate Pain (4 - 6)

## 2024-04-16 NOTE — DIETITIAN INITIAL EVALUATION ADULT - PERSON TAUGHT/METHOD
Briefly reviewed DM education, pt declined further DM education and handouts at this time. RD emphasized what foods contain carbohydrates, importance of pairing carbohydrates with protein for glycemic control; choosing whole grains vs refined carbohydrates; limiting refined sugars, portion sizes. Pt declined handouts at this time.     Provided education on high-protein diet for wound healing and preservation of lean body mass. Encouraged consumption of HBV foods, prioritizing protein foods first at meal times, and importance of  meeting adequate protein-energy needs. Pt aware RD remains available./verbal instruction/patient instructed

## 2024-04-16 NOTE — PROGRESS NOTE ADULT - SUBJECTIVE AND OBJECTIVE BOX
[facility] HEMATOLOGY/ONCOLOGY INPATIENT PROGRESS NOTE     Interval Hx:   04-16-24: Mr. Menon was seen at bedside today.    Meds:   MEDICATIONS  (STANDING):  aspirin  chewable 81 milliGRAM(s) Oral daily  chlorhexidine 4% Liquid 1 Application(s) Topical daily  clopidogrel Tablet 75 milliGRAM(s) Oral daily  dextrose 10% Bolus 125 milliLiter(s) IV Bolus once  dextrose 5%. 1000 milliLiter(s) (50 mL/Hr) IV Continuous <Continuous>  dextrose 5%. 1000 milliLiter(s) (100 mL/Hr) IV Continuous <Continuous>  dextrose 50% Injectable 12.5 Gram(s) IV Push once  dextrose 50% Injectable 25 Gram(s) IV Push once  enoxaparin Injectable 40 milliGRAM(s) SubCutaneous every 24 hours  ertapenem  IVPB 1000 milliGRAM(s) IV Intermittent every 24 hours  glucagon  Injectable 1 milliGRAM(s) IntraMuscular once  influenza   Vaccine 0.5 milliLiter(s) IntraMuscular once  insulin lispro (ADMELOG) corrective regimen sliding scale   SubCutaneous at bedtime  insulin lispro (ADMELOG) corrective regimen sliding scale   SubCutaneous three times a day before meals    MEDICATIONS  (PRN):  acetaminophen     Tablet .. 650 milliGRAM(s) Oral every 6 hours PRN Mild Pain (1 - 3)  dextrose Oral Gel 15 Gram(s) Oral once PRN Blood Glucose LESS THAN 70 milliGRAM(s)/deciliter  HYDROmorphone  Injectable 0.5 milliGRAM(s) IV Push every 4 hours PRN Severe Pain (7 - 10)  oxycodone    5 mG/acetaminophen 325 mG 1 Tablet(s) Oral every 4 hours PRN Moderate Pain (4 - 6)    Vital Signs Last 24 Hrs  T(C): 36.4 (16 Apr 2024 04:07), Max: 36.7 (15 Apr 2024 21:07)  T(F): 97.6 (16 Apr 2024 04:07), Max: 98.1 (15 Apr 2024 21:07)  HR: 67 (16 Apr 2024 04:07) (67 - 76)  BP: 118/77 (16 Apr 2024 04:07) (118/69 - 123/76)  BP(mean): --  RR: 18 (16 Apr 2024 04:07) (18 - 18)  SpO2: 98% (16 Apr 2024 04:07) (96% - 98%)    Parameters below as of 16 Apr 2024 04:07  Patient On (Oxygen Delivery Method): room air    Physical Exam:  Gen: NAD  HEENT: EOMI, MMM  Chest: equal chest rise, speaking full sentences   Cardiac: RR  Abd: Nondistended  Ext: LLE/foot cellulitis now surgical  Neuro: AAOx3, normal mood and affect     Labs:                        10.2   8.52  )-----------( 525      ( 15 Apr 2024 07:08 )             30.2     CBC Full  -  ( 15 Apr 2024 07:08 )  WBC Count : 8.52 K/uL  RBC Count : 3.64 M/uL  Hemoglobin : 10.2 g/dL  Hematocrit : 30.2 %  Platelet Count - Automated : 525 K/uL  Mean Cell Volume : 83.0 fl  Mean Cell Hemoglobin : 28.0 pg  Mean Cell Hemoglobin Concentration : 33.8 gm/dL    04-15    137  |  102  |  28<H>  ----------------------------<  176<H>  4.9   |  24  |  1.22    Ca    9.9      15 Apr 2024 07:08  Phos  3.9     04-15  Mg     1.9     04-15    TPro  6.9  /  Alb  3.1<L>  /  TBili  0.2  /  DBili  x   /  AST  11  /  ALT  18  /  AlkPhos  79  04-14       [facility] HEMATOLOGY/ONCOLOGY INPATIENT PROGRESS NOTE     Interval Hx:   04-16-24: Mr. Menon was seen at bedside today, no overnight events noted, pending wound vac and then DC home, pt aware of outpatient follow up with Dr. Rock once discharged     Meds:   MEDICATIONS  (STANDING):  aspirin  chewable 81 milliGRAM(s) Oral daily  chlorhexidine 4% Liquid 1 Application(s) Topical daily  clopidogrel Tablet 75 milliGRAM(s) Oral daily  dextrose 10% Bolus 125 milliLiter(s) IV Bolus once  dextrose 5%. 1000 milliLiter(s) (50 mL/Hr) IV Continuous <Continuous>  dextrose 5%. 1000 milliLiter(s) (100 mL/Hr) IV Continuous <Continuous>  dextrose 50% Injectable 12.5 Gram(s) IV Push once  dextrose 50% Injectable 25 Gram(s) IV Push once  enoxaparin Injectable 40 milliGRAM(s) SubCutaneous every 24 hours  ertapenem  IVPB 1000 milliGRAM(s) IV Intermittent every 24 hours  glucagon  Injectable 1 milliGRAM(s) IntraMuscular once  influenza   Vaccine 0.5 milliLiter(s) IntraMuscular once  insulin lispro (ADMELOG) corrective regimen sliding scale   SubCutaneous at bedtime  insulin lispro (ADMELOG) corrective regimen sliding scale   SubCutaneous three times a day before meals    MEDICATIONS  (PRN):  acetaminophen     Tablet .. 650 milliGRAM(s) Oral every 6 hours PRN Mild Pain (1 - 3)  dextrose Oral Gel 15 Gram(s) Oral once PRN Blood Glucose LESS THAN 70 milliGRAM(s)/deciliter  HYDROmorphone  Injectable 0.5 milliGRAM(s) IV Push every 4 hours PRN Severe Pain (7 - 10)  oxycodone    5 mG/acetaminophen 325 mG 1 Tablet(s) Oral every 4 hours PRN Moderate Pain (4 - 6)    Vital Signs Last 24 Hrs  T(C): 36.4 (16 Apr 2024 04:07), Max: 36.7 (15 Apr 2024 21:07)  T(F): 97.6 (16 Apr 2024 04:07), Max: 98.1 (15 Apr 2024 21:07)  HR: 67 (16 Apr 2024 04:07) (67 - 76)  BP: 118/77 (16 Apr 2024 04:07) (118/69 - 123/76)  BP(mean): --  RR: 18 (16 Apr 2024 04:07) (18 - 18)  SpO2: 98% (16 Apr 2024 04:07) (96% - 98%)    Parameters below as of 16 Apr 2024 04:07  Patient On (Oxygen Delivery Method): room air    Physical Exam:  Gen: NAD  HEENT: EOMI, MMM  Chest: equal chest rise, speaking full sentences   Cardiac: RR  Abd: Nondistended  Ext: LLE/foot cellulitis now surgical  Neuro: AAOx3, normal mood and affect     Labs:                        10.2   8.52  )-----------( 525      ( 15 Apr 2024 07:08 )             30.2     CBC Full  -  ( 15 Apr 2024 07:08 )  WBC Count : 8.52 K/uL  RBC Count : 3.64 M/uL  Hemoglobin : 10.2 g/dL  Hematocrit : 30.2 %  Platelet Count - Automated : 525 K/uL  Mean Cell Volume : 83.0 fl  Mean Cell Hemoglobin : 28.0 pg  Mean Cell Hemoglobin Concentration : 33.8 gm/dL    04-15    137  |  102  |  28<H>  ----------------------------<  176<H>  4.9   |  24  |  1.22    Ca    9.9      15 Apr 2024 07:08  Phos  3.9     04-15  Mg     1.9     04-15    TPro  6.9  /  Alb  3.1<L>  /  TBili  0.2  /  DBili  x   /  AST  11  /  ALT  18  /  AlkPhos  79  04-14

## 2024-04-16 NOTE — DIETITIAN INITIAL EVALUATION ADULT - PROBLEM SELECTOR PLAN 4
Patient with septal Q waves on EKG.   No present symptoms of ACS.  Will obtain echo to assess LV function.

## 2024-04-16 NOTE — PROGRESS NOTE ADULT - PROBLEM SELECTOR PLAN 3
Stage IV Rectal Adenocarcinoma with metastasis to liver  Diagnosed 08/2023. Follows with Dr. Renny Purvis.  - Oncology following  - Chemotherapy held in setting of infection  - Follow-up outpatient

## 2024-04-16 NOTE — DIETITIAN INITIAL EVALUATION ADULT - REASON
Nutrition focused physical exam deferred at this time as pt is eating well and has not reported any recent significant weight changes.

## 2024-04-16 NOTE — DIETITIAN INITIAL EVALUATION ADULT - NUTRITIONGOAL OUTCOME2
Pt will be able to adhere to nutrition-related recommendations to improve nutritional status and labs.

## 2024-04-16 NOTE — DIETITIAN INITIAL EVALUATION ADULT - NSFNSPHYEXAMSKINFT_GEN_A_CORE
Per Podiatry Note 4/16:  "4/12 s/p left foot wound debridement w kerecis micro graft: staples/adaptic and graft intact, no drainage, 5th toe slightly dusky but warm to touch. VAC kept CDI today."

## 2024-04-16 NOTE — PROGRESS NOTE ADULT - PROBLEM SELECTOR PROBLEM 4
Type 2 diabetes mellitus

## 2024-04-16 NOTE — PROVIDER CONTACT NOTE (OTHER) - SITUATION
Noted patient has no DVT prophylaxis ordered.
Patient refusing finger sticks
PT is refusing afternoon finger stick, he refused lunch time finger stick as well, previous covering MD made aware
Patient refusing bedtime 22:00 fingerstick
Pt has been refusing fingersticks throughout the day despite being educated about the importance
Pt is refusing fingersticks.
Pts fingerstick was 205 this am. Pt refused insulin stating that he does not "want all that insulin in him" and does not understand why his fingerstick is so high.
Pt is refusing finger stick and does not want them done anymore, educated patient regarding importance of monitoring and he still insisted on no finger sticks
pt left foot resection had large amount of bleeding

## 2024-04-16 NOTE — PROVIDER CONTACT NOTE (OTHER) - ACTION/TREATMENT ORDERED:
MD aware. MD assessed pt at bedside
MD notified. No further recs at this time. Will continue to monitor patient.
MD made aware
MD made aware; no further interventions
No instructions
Aware pt did not want finger stick will talk with him tomorrow
MD noted pts refusal.
Noted
Patient instructed of importance of checking blood sugar.

## 2024-04-16 NOTE — PROGRESS NOTE ADULT - SUBJECTIVE AND OBJECTIVE BOX
SUBJECTIVE / OVERNIGHT EVENTS:    patient seen and examined  resting comfortably OOB in chair  no events overnight  no c/o at this time    --------------------------------------------------------------------------------------------  LABS:                        10.2   8.52  )-----------( 525      ( 15 Apr 2024 07:08 )             30.2     04-15    137  |  102  |  28<H>  ----------------------------<  176<H>  4.9   |  24  |  1.22    Ca    9.9      15 Apr 2024 07:08  Phos  3.9     04-15  Mg     1.9     04-15    TPro  6.9  /  Alb  3.1<L>  /  TBili  0.2  /  DBili  x   /  AST  11  /  ALT  18  /  AlkPhos  79  04-14      CAPILLARY BLOOD GLUCOSE            Urinalysis Basic - ( 15 Apr 2024 07:08 )    Color: x / Appearance: x / SG: x / pH: x  Gluc: 176 mg/dL / Ketone: x  / Bili: x / Urobili: x   Blood: x / Protein: x / Nitrite: x   Leuk Esterase: x / RBC: x / WBC x   Sq Epi: x / Non Sq Epi: x / Bacteria: x        RADIOLOGY & ADDITIONAL TESTS:    Imaging Personally Reviewed:  [x] YES  [ ] NO    Consultant(s) Notes Reviewed:  [x] YES  [ ] NO    MEDICATIONS  (STANDING):  aspirin  chewable 81 milliGRAM(s) Oral daily  chlorhexidine 4% Liquid 1 Application(s) Topical daily  clopidogrel Tablet 75 milliGRAM(s) Oral daily  dextrose 10% Bolus 125 milliLiter(s) IV Bolus once  dextrose 5%. 1000 milliLiter(s) (50 mL/Hr) IV Continuous <Continuous>  dextrose 5%. 1000 milliLiter(s) (100 mL/Hr) IV Continuous <Continuous>  dextrose 50% Injectable 12.5 Gram(s) IV Push once  dextrose 50% Injectable 25 Gram(s) IV Push once  enoxaparin Injectable 40 milliGRAM(s) SubCutaneous every 24 hours  ertapenem  IVPB 1000 milliGRAM(s) IV Intermittent every 24 hours  glucagon  Injectable 1 milliGRAM(s) IntraMuscular once  influenza   Vaccine 0.5 milliLiter(s) IntraMuscular once  insulin lispro (ADMELOG) corrective regimen sliding scale   SubCutaneous at bedtime  insulin lispro (ADMELOG) corrective regimen sliding scale   SubCutaneous three times a day before meals    MEDICATIONS  (PRN):  acetaminophen     Tablet .. 650 milliGRAM(s) Oral every 6 hours PRN Mild Pain (1 - 3)  dextrose Oral Gel 15 Gram(s) Oral once PRN Blood Glucose LESS THAN 70 milliGRAM(s)/deciliter  HYDROmorphone  Injectable 0.5 milliGRAM(s) IV Push every 4 hours PRN Severe Pain (7 - 10)  oxycodone    5 mG/acetaminophen 325 mG 1 Tablet(s) Oral every 4 hours PRN Moderate Pain (4 - 6)      Care Discussed with Consultants/Other Providers [x] YES  [ ] NO    Vital Signs Last 24 Hrs  T(C): 36.4 (16 Apr 2024 04:07), Max: 36.7 (15 Apr 2024 21:07)  T(F): 97.6 (16 Apr 2024 04:07), Max: 98.1 (15 Apr 2024 21:07)  HR: 67 (16 Apr 2024 04:07) (67 - 76)  BP: 118/77 (16 Apr 2024 04:07) (118/69 - 123/76)  BP(mean): --  RR: 18 (16 Apr 2024 04:07) (18 - 18)  SpO2: 98% (16 Apr 2024 04:07) (96% - 98%)    Parameters below as of 16 Apr 2024 04:07  Patient On (Oxygen Delivery Method): room air      I&O's Summary    15 Apr 2024 07:01  -  16 Apr 2024 07:00  --------------------------------------------------------  IN: 240 mL / OUT: 1325 mL / NET: -1085 mL    PHYSICAL EXAM:  GENERAL: NAD, well-developed, comfortable  HEAD:  Atraumatic, Normocephalic  EYES: EOMI, PERRLA, conjunctiva and sclera clear  NECK: Supple, No JVD  CHEST/LUNG: Clear to auscultation bilaterally; No wheeze  HEART: Regular rate and rhythm; No murmurs, rubs, or gallops  ABDOMEN: Soft, Nontender, Nondistended; Bowel sounds present  NEURO: AAOx3, no focal weakness, 5/5 b/l extremity strength, b/l knee no arthritis, no effusion   EXTREMITIES:  LLE dsg c/d/i, + Wound vac, LUE PICC  SKIN: No rashes or lesions

## 2024-04-16 NOTE — DIETITIAN INITIAL EVALUATION ADULT - NSFNSGIIOFT_GEN_A_CORE
Patient/Caregiver provided printed discharge information.
Pt reported no nausea or vomiting at this time.   Pt reported normal colostomy output.   Bowel Regimen: Pt not currently ordered for a bowel regimen at this time (per orders).   Continue to monitor colostomy output and regularity.

## 2024-04-16 NOTE — DIETITIAN INITIAL EVALUATION ADULT - ADD RECOMMEND
- Recommend continue current diet as tolerated: Consistent Carbohydrate. Defer diet texture/consistency to Medical Team.   - Recommend Multivitamin and Vitamin C daily unless contraindicated for wound healing and micronutrient coverage.   - RD to allow for 2x protein at meals (per pt request) to optimize protein-energy intake in-house and assist with wound healing.   - Monitor and encourage adequate PO intake, obtain food preferences and honor as able. Pt declined menu at this time.   - Monitor glucose fingersticks as patient allows.   - Monitor nutritional intake, tolerance to diet prescription, weights, labs, and skin integrity.  - Recommend continue current diet as tolerated: Consistent Carbohydrate. Defer diet texture/consistency to Medical Team.   - Defer multivitamin and vitamin C to medical team.   - RD to allow for 2x protein at meals (per pt request) to optimize protein-energy intake in-house and assist with wound healing.   - Monitor and encourage adequate PO intake, obtain food preferences and honor as able. Pt declined menu at this time.   - Monitor glucose fingersticks as patient allows.   - Monitor nutritional intake, tolerance to diet prescription, weights, labs, and skin integrity.

## 2024-04-16 NOTE — DIETITIAN INITIAL EVALUATION ADULT - SIGNS/SYMPTOMS
as evidenced by pt with A1C 6.8%, elevated glucose fingersticks in-house.  as evidenced by pt with foot wound s/p debridement/graft and Stage IV Rectal Adenocarcinoma.

## 2024-04-16 NOTE — PROGRESS NOTE ADULT - SUBJECTIVE AND OBJECTIVE BOX
Patient is a 59y old  Male who presents with a chief complaint of Sent to the ER by PCP following concern for LEFT foot infection (16 Apr 2024 05:58)       INTERVAL HPI/OVERNIGHT EVENTS:  Patient seen and evaluated at bedside.  Pt is resting comfortable in NAD. Denies N/V/F/C.    Allergies    No Known Allergies    Intolerances        Vital Signs Last 24 Hrs  T(C): 36.4 (16 Apr 2024 04:07), Max: 36.7 (15 Apr 2024 21:07)  T(F): 97.6 (16 Apr 2024 04:07), Max: 98.1 (15 Apr 2024 21:07)  HR: 67 (16 Apr 2024 04:07) (67 - 76)  BP: 118/77 (16 Apr 2024 04:07) (118/69 - 123/76)  BP(mean): --  RR: 18 (16 Apr 2024 04:07) (18 - 18)  SpO2: 98% (16 Apr 2024 04:07) (96% - 98%)    Parameters below as of 16 Apr 2024 04:07  Patient On (Oxygen Delivery Method): room air        LABS:                        10.2   8.52  )-----------( 525      ( 15 Apr 2024 07:08 )             30.2     04-15    137  |  102  |  28<H>  ----------------------------<  176<H>  4.9   |  24  |  1.22    Ca    9.9      15 Apr 2024 07:08  Phos  3.9     04-15  Mg     1.9     04-15    TPro  6.9  /  Alb  3.1<L>  /  TBili  0.2  /  DBili  x   /  AST  11  /  ALT  18  /  AlkPhos  79  04-14      Urinalysis Basic - ( 15 Apr 2024 07:08 )    Color: x / Appearance: x / SG: x / pH: x  Gluc: 176 mg/dL / Ketone: x  / Bili: x / Urobili: x   Blood: x / Protein: x / Nitrite: x   Leuk Esterase: x / RBC: x / WBC x   Sq Epi: x / Non Sq Epi: x / Bacteria: x      CAPILLARY BLOOD GLUCOSE          Lower Extremity Physical Exam:  Vascular: DP/PT 2/4, B/L, CFT <3  seconds B/L, Temperature gradient warm to cool B/L.   Neuro: Epicritic sensation diminished to the level of digits, B/L.  Musculoskeletal/Ortho: unremarkable   Skin: 4/12 s/p left foot wound debridement w kerecis micro graft: staples/adaptic and graft intact, no drainage, 5th toe slightly dusky but warm to touch. VAC kept CDI today     RADIOLOGY & ADDITIONAL TESTS:

## 2024-04-16 NOTE — DIETITIAN INITIAL EVALUATION ADULT - ORAL INTAKE PTA/DIET HISTORY
Nutrition assessment completed at bedside. Pt reported good appetite and PO intake prior to admission, endorsed no recent significant changes. Pt reported UBW of ~180 pounds, reported weights have been stable. Confirmed no known food allergies.

## 2024-04-16 NOTE — PROGRESS NOTE ADULT - SUBJECTIVE AND OBJECTIVE BOX
DATE OF SERVICE: 04-16-24 @ 16:45    Patient is a 59y old  Male who presents with a chief complaint of Sent to the ER by PCP following concern for LEFT foot infection (16 Apr 2024 13:58)      INTERVAL HISTORY: Feels ok.     REVIEW OF SYSTEMS:  CONSTITUTIONAL: No weakness  EYES/ENT: No visual changes;  No throat pain   NECK: No pain or stiffness  RESPIRATORY: No cough, wheezing; No shortness of breath  CARDIOVASCULAR: No chest pain or palpitations  GASTROINTESTINAL: No abdominal  pain. No nausea, vomiting, or hematemesis  GENITOURINARY: No dysuria, frequency or hematuria  NEUROLOGICAL: No stroke like symptoms  SKIN: No rashes    	  MEDICATIONS:        PHYSICAL EXAM:  T(C): 36.4 (04-16-24 @ 11:33), Max: 36.7 (04-15-24 @ 21:07)  HR: 67 (04-16-24 @ 11:33) (67 - 69)  BP: 126/72 (04-16-24 @ 11:33) (118/77 - 126/72)  RR: 18 (04-16-24 @ 11:33) (18 - 18)  SpO2: 97% (04-16-24 @ 11:33) (97% - 98%)  Wt(kg): --  I&O's Summary    15 Apr 2024 07:01  -  16 Apr 2024 07:00  --------------------------------------------------------  IN: 240 mL / OUT: 1325 mL / NET: -1085 mL          Appearance: In no distress	  HEENT:    PERRL, EOMI	  Cardiovascular:  S1 S2, No JVD  Respiratory: Lungs clear to auscultation	  Gastrointestinal:  Soft, Non-tender, + BS	  Vascularature:  No edema of LE  Psychiatric: Appropriate affect   Neuro: no acute focal deficits                               10.2   8.52  )-----------( 525      ( 15 Apr 2024 07:08 )             30.2     04-15    137  |  102  |  28<H>  ----------------------------<  176<H>  4.9   |  24  |  1.22    Ca    9.9      15 Apr 2024 07:08  Phos  3.9     04-15  Mg     1.9     04-15          Labs personally reviewed      ASSESSMENT/PLAN: 	    This is a 59 year old male PMH: Colon ca, Tobacco dependance.  Admitted for left foot pain. + Ulcer      1. Preop Risk Stratification - TTE unremarkable with preserved EF  - EKG non ischemic   - Reports good functional capacity prior to diabetic foot ulcer  - Elevated risk for mod risk LE angio and pods surgery, no contraindication to proceed  - s/p peripheral angio with BMS placement.   - L foot 5th metatarsal head resection with graft application   - Tolerated surgery well.  - Return to OR for left foot partial fifth ray resection, wound debridement and graft application today 4/12 at 1pm with Dr. Call   -  s/p left foot wound debridement w kerecis micro graft. Tolerated well.     2.   Diabetic foot ulcer  - MRI foot  suggestive of early osteomyelitis   - C/w local wound care per Pod  - Abx as per primary team  - POD following-> s/p left foot fifth metatarsal head resection, open 4/10  - Plan for left foot partial fifth ray resection, wound debridement and graft application today 4/12 at 1pm with Dr. Call   --  s/p left foot wound debridement w kerecis micro graft. Tolerated well.     3.  Dm2:  - HgbA1c 6.8  - ISS as per primary team    4 DVT ppx:  - Lovenox sq          Isabel Deleon, AG-NP   Nuno Yeager DO [facility]  Cardiovascular Medicine  [address] [place] [address], [address]  Available through call or text on [employer]  Office: [phone]

## 2024-04-16 NOTE — DIETITIAN INITIAL EVALUATION ADULT - NSPROEDALEARNPREF_GEN_A_NUR
Impression: Central retinal vein occlusion, left eye, with macular edema: H34.8120. OS. Condition: unstable. Vision: vision affected. s/p COREY OS 07/09/2020, AV OS 5/18/2020, AV/Corey OS 11/4/2019, Corey OS 06/10/2019 . Ector Diggs Plan: Discussed diagnosis in detail with patient. Exam OS shows active edema confirmed by OCT OS. Discussed risks of progression. Based on exam and OCT recommend Ozurdex implant, LEFT EYE in order to help reduce the swelling and prevent a further reduction in vision. Discussed the risks and benefits of tx including the possibility of increase in IOP. All questions answered. Patient elects to proceed with recommendation. RL1. verbal instruction

## 2024-04-16 NOTE — DIETITIAN INITIAL EVALUATION ADULT - PERTINENT LABORATORY DATA
04-15    137  |  102  |  28<H>  ----------------------------<  176<H>  4.9   |  24  |  1.22    Ca    9.9      15 Apr 2024 07:08  Phos  3.9     04-15  Mg     1.9     04-15    TPro  6.9  /  Alb  3.1<L>  /  TBili  0.2  /  DBili  x   /  AST  11  /  ALT  18  /  AlkPhos  79  04-14  A1C with Estimated Average Glucose Result: 6.8 % (04-03-24 @ 07:17)

## 2024-04-16 NOTE — PROGRESS NOTE ADULT - ASSESSMENT
59y old Male with history of T2DM not on meds, Colon cancer s/p colectomy and colostomy and peripheral neuropathy here with acute osteomyelitis of left metatarsal s/p resection on 4/10.     1. T2DM with hyperglycemia  - Going home today, he will monitor his FS at home and if he notices persistent hyperglycemia, he will follow up with his PCP    2. Left 5th metatarsal osteomyelitis s/p amputation  - s/p graft placement  - on Ertapenem    Catracho Mooney MD  [facility]- Division of Endocrinology    [address] [place] [address]    T [phone]  F [phone]

## 2024-04-16 NOTE — DIETITIAN INITIAL EVALUATION ADULT - NSFNSADHERENCEPTAFT_GEN_A_CORE
Prior to admission, pt stated he followed a low sugar, low carbohydrate diet in setting of diabetes. Pt stated he did not take any medications for diabetes prior to admission, endorsed checking his blood sugars 1-2x per day. Pt stated average blood glucose levels ranging from 110-130 mg/dL. Pt reported good appetite and PO intake, no recent significant changes.

## 2024-04-16 NOTE — PROGRESS NOTE ADULT - ASSESSMENT
Mr. Menon is a 59 year old male active smoker with PMHx of  peripheral neuropathy, Type II DM,  anemia,  status post colectomy with colostomy 01/2024  due to rectal cancer under the care of Dr. Renny Purvis now admitted with osteomyelitis of the left foot.  MRI foot shows Findings suggestive of early osteomyelitis at the fifth metatarsal head and base of the proximal phalanx, deep to the soft tissue wound. Focal osteitis is also a differential consideration for the osseous findings. Podiatry pending resection with graft. Infectious disease consulted.    Labs show hemoglobin 11.6, hematocrit  34.7, MCV 83.6 platelet count 339, WBC 12.14 with neutrophilic predominance. Creatinine 1.12 with normal liver function.  HbA1c 6.8.  TTE with EF 69%.     Patient last seen in Hematology Oncology Clinic 04/02/2024  and oxaliplatin was discontinued due to peripheral neuropathy. Podiatry scheduled pt for left foot fifth metatarsal head resection with wound debridement and graft application 04/04/2024, DAMIEN concerning, vascular performed LLE angio, balloon angioplasty and stent to SFA 04/08/2024.     04/11/2024: s/p 5th metatarsal head and neck resection with podiatry, pending return to OR with podiatry today, noted Endo recs, counseled pt on need for glycemic control including for acute wound healing, but also chronically for optimal medical optimization given on going need for antineoplastic therapy, he was frustrated and would like to re-visit topic at later date, stated he believes insulin makes his blood sugars higher, delineated mechanism of therapy but continued frustration, will continue to  as appropriate.     04/12/2024:  tolerated return to OR well 04/12/2024 with podiatry, concern for ongoing viability given prolonged capillary refill time     # Stage IV Rectal Adenocarcinoma   - Diagnosed 08/2023  - Invasive, moderately differentiated colonic adenocarcinoma  - Low probability of MSI-high  - HER-2-Mary Negative  - KRAS is Wild Type  - Metastatic to liver  - FOLFOX and weekly Cetuximab started 9/18/23  - PET-CT from 03/20/2024 shows that the patient is status post surgery with no abnormal FDG uptake in the pelvis postsurgical area.  More prominent uptake in the gallbladder fossa (early recurrence versus normal fluctuation of uptake ) and no other FDG avid lesion in the field-of-view.  Previously noted liver Mets are not presently seen.  - Was scheduled to receive 5FU, Leucovorin and Cetuximab 04/02/2024, therapy held due to concern for L foot infection and pt sent to Podiatry   - Plan to continue therapy once able to from infection standpoint, pt is aware     # Left Foot Osteomyelitis  # PVD  - Vascular surgery DAMIEN suggestive of moderate arterial flow limitation in the left lower extremity localizing to the iliofemoral distribution  --DAMIEN LLE with 0.65 indicating moderate lower extremity arterial vascular disease  - LLE angio, balloon angioplasty and stent to SFA 04/08/2024]  - Podiatry s/p left foot fifth metatarsal head/neck resection with wound debridement and graft application, 04/11/2024   -  tolerated return to OR well 04/12/2024 with podiatry, concern for ongoing viability given prolonged capillary refill time   - Antibiotics per ID and primary team, PICC line for Abx     # Neutrophilia   # Thrombocytosis   - Likely due to infection  - Outpatient labs 04/02/2024 WBC 16.19 ANC 14,170   - Continue to monitor, improving     # Normocytic anemia  - Likely multifactorial, active infection, cancer, chemotherapy  - Outpatient labs 04/02/2024 Hb 13.7, MCV 83.5   - Continue to monitor  - Transfuse to maintain Hb > 7     # Nicotine dependence, active cigarette smoker  - Counseling provided, pt aware of possible compromised wound healing     # Type II DM  - HBA1c 6.8  - inpatient Hyperglycemia noted  - Endocrinology following  - Counseling provided daily       Thank you for allowing me to participate in the care of Mr. Menon, please do not hesitate to call or text me if you have further questions or concerns.     Gato Horvath MD  [facility]   Division of Hematology/Oncology  [address] [place] [address], [address]  [place] [address]  P: [phone]  F: [phone]    Attestation:    ----Pt evaluated including face-to-face interaction in addition to chart review, reviewing treatment plan, and managing the patient’s chronic diagnoses as listed in the assessment---- Mr. Menon is a 59 year old male active smoker with PMHx of  peripheral neuropathy, Type II DM,  anemia,  status post colectomy with colostomy 01/2024  due to rectal cancer under the care of Dr. Renny Purvis now admitted with osteomyelitis of the left foot.  MRI foot shows Findings suggestive of early osteomyelitis at the fifth metatarsal head and base of the proximal phalanx, deep to the soft tissue wound. Focal osteitis is also a differential consideration for the osseous findings. Podiatry pending resection with graft. Infectious disease consulted.    Labs show hemoglobin 11.6, hematocrit  34.7, MCV 83.6 platelet count 339, WBC 12.14 with neutrophilic predominance. Creatinine 1.12 with normal liver function.  HbA1c 6.8.  TTE with EF 69%.     Patient last seen in Hematology Oncology Clinic 04/02/2024  and oxaliplatin was discontinued due to peripheral neuropathy. Podiatry scheduled pt for left foot fifth metatarsal head resection with wound debridement and graft application 04/04/2024, DAMIEN concerning, vascular performed LLE angio, balloon angioplasty and stent to SFA 04/08/2024.     04/11/2024: s/p 5th metatarsal head and neck resection with podiatry, pending return to OR with podiatry today, noted Endo recs, counseled pt on need for glycemic control including for acute wound healing, but also chronically for optimal medical optimization given on going need for antineoplastic therapy, he was frustrated and would like to re-visit topic at later date, stated he believes insulin makes his blood sugars higher, delineated mechanism of therapy but continued frustration, will continue to  as appropriate.     04/12/2024:  tolerated return to OR well 04/12/2024 with podiatry, concern for ongoing viability given prolonged capillary refill time     # Stage IV Rectal Adenocarcinoma   - Diagnosed 08/2023  - Invasive, moderately differentiated colonic adenocarcinoma  - Low probability of MSI-high  - HER-2-Mary Negative  - KRAS is Wild Type  - Metastatic to liver  - FOLFOX and weekly Cetuximab started 9/18/23  - PET-CT from 03/20/2024 shows that the patient is status post surgery with no abnormal FDG uptake in the pelvis postsurgical area.  More prominent uptake in the gallbladder fossa (early recurrence versus normal fluctuation of uptake ) and no other FDG avid lesion in the field-of-view.  Previously noted liver Mets are not presently seen.  - Was scheduled to receive 5FU, Leucovorin and Cetuximab 04/02/2024, therapy held due to concern for L foot infection and pt sent to Podiatry   - Plan to continue therapy once able to from infection standpoint, pt is aware     # Left Foot Osteomyelitis  # PVD  - Vascular surgery DAMIEN suggestive of moderate arterial flow limitation in the left lower extremity localizing to the iliofemoral distribution  --DAMIEN LLE with 0.65 indicating moderate lower extremity arterial vascular disease  - LLE angio, balloon angioplasty and stent to SFA 04/08/2024]  - Podiatry s/p left foot fifth metatarsal head/neck resection with wound debridement and graft application, 04/11/2024   -  tolerated return to OR well 04/12/2024 with podiatry, concern for ongoing viability given prolonged capillary refill time   - Antibiotics per ID and primary team, PICC line for Abx   - Wound vac per podiatry     # Neutrophilia   # Thrombocytosis   - Likely due to infection, improved   - Outpatient labs 04/02/2024 WBC 16.19 ANC 14,170   - Continue to monitor    # Normocytic anemia  - Likely multifactorial, active infection, cancer, chemotherapy  - Outpatient labs 04/02/2024 Hb 13.7, MCV 83.5   - Continue to monitor  - Transfuse to maintain Hb > 7     # Nicotine dependence, active cigarette smoker  - Counseling provided, pt aware of possible compromised wound healing     # Type II DM  - HBA1c 6.8  - inpatient Hyperglycemia noted  - Endocrinology following  - Counseling provided daily       Thank you for allowing me to participate in the care of Mr. Menon, please do not hesitate to call or text me if you have further questions or concerns.     Gato Horvath MD  [facility]   Division of Hematology/Oncology  [address] [place] [address], [address]  [place] [address]  P: [phone]  F: [phone]    Attestation:    ----Pt evaluated including face-to-face interaction in addition to chart review, reviewing treatment plan, and managing the patient’s chronic diagnoses as listed in the assessment----

## 2024-04-16 NOTE — DISCHARGE NOTE NURSING/CASE MANAGEMENT/SOCIAL WORK - NSDCFUADDAPPT_GEN_ALL_CORE_FT
Podiatry Discharge Instructions:  Follow up: Please follow up with Dr. Call within 1 week of discharge from the hospital, please call [phone] for appointment and discuss that you recently were seen in the hospital.  Wound Care: Please apply black granuofoam wound vac at 75mmHg continuously 3 times a week.  Weight bearing: Please weight bear as tolerated in a surgical shoe to left heel.  Antibiotics: Please continue as instructed.

## 2024-04-16 NOTE — DIETITIAN INITIAL EVALUATION ADULT - NSFNSNUTRCHEWSWALLOWFT_GEN_A_CORE
Pt reported no chewing or swallowing difficulties at this time. Defer diet texture/consistency to Medical Team.

## 2024-04-16 NOTE — PROGRESS NOTE ADULT - PROBLEM SELECTOR PLAN 2
No signs of active ACS or CHF.  >4 METS prior to current infection  EKG without signs of ischemia  No further work-up needed prior to LLE angiogram with possible stent or L foot 5th metatarsal head resection with graft application.    Per Cardiology 4/3: elevated risk for moderate risk podiatry surgery. No contraindications to proceed.
No signs of active ACS or CHF.  >4 METS prior to current infection  EKG without signs of ischemia  No further work-up needed prior to L foot 5th metatarsal head resection with graft application    Per Cardiology 4/3: elevated risk for moderate risk podiatry surgery. No contraindications to proceed.

## 2024-04-16 NOTE — PROGRESS NOTE ADULT - SUBJECTIVE AND OBJECTIVE BOX
[facility] DIVISION OF INFECTIOUS DISEASES  REGIS Khan Y. Patel, S. Shah, G. Casimir  [phone]  ([phone] - weekdays after 5pm and weekends)    Name: EDA CHAWLA  Age/Gender: 59y Male  MRN: [id number]    Interval History:  Patient seen and examined this morning.   Sitting in chair, feels well, no new complaints.   Notes reviewed  No concerning overnight events  Afebrile   Allergies: No Known Allergies      Objective:  Vitals:   T(F): 97.6 (04-16-24 @ 04:07), Max: 98.1 (04-15-24 @ 21:07)  HR: 67 (04-16-24 @ 04:07) (67 - 76)  BP: 118/77 (04-16-24 @ 04:07) (118/69 - 123/76)  RR: 18 (04-16-24 @ 04:07) (18 - 18)  SpO2: 98% (04-16-24 @ 04:07) (96% - 98%)  Physical Examination:  General: no acute distress, nontoxic appearing   HEENT: NC/AT, anicteric, neck supple  Respiratory: no acc muscle use, breathing comfortably  Cardiovascular: S1 and S2 present  Gastrointestinal: normal appearing, nondistended  Extremities: L foot dressing, wound vac   Skin: no visible rash  LUE PICC without erythema     Laboratory Studies:  CBC:                       10.2   8.52  )-----------( 525      ( 15 Apr 2024 07:08 )             30.2     WBC Trend:  8.52 04-15-24 @ 07:08  9.91 04-14-24 @ 11:25  9.63 04-13-24 @ 07:05  10.35 04-12-24 @ 06:40  11.90 04-11-24 @ 07:42  13.33 04-10-24 @ 06:34    CMP: 04-15    137  |  102  |  28<H>  ----------------------------<  176<H>  4.9   |  24  |  1.22    Ca    9.9      15 Apr 2024 07:08  Phos  3.9     04-15  Mg     1.9     04-15    TPro  6.9  /  Alb  3.1<L>  /  TBili  0.2  /  DBili  x   /  AST  11  /  ALT  18  /  AlkPhos  79  04-14    Creatinine: 1.22 mg/dL (04-15-24 @ 07:08)  Creatinine: 1.06 mg/dL (04-14-24 @ 11:23)  Creatinine: 1.36 mg/dL (04-13-24 @ 07:05)  Creatinine: 1.11 mg/dL (04-12-24 @ 06:40)  Creatinine: 1.14 mg/dL (04-11-24 @ 07:42)  Creatinine: 1.09 mg/dL (04-10-24 @ 06:34)    LIVER FUNCTIONS - ( 14 Apr 2024 11:23 )  Alb: 3.1 g/dL / Pro: 6.9 g/dL / ALK PHOS: 79 U/L / ALT: 18 U/L / AST: 11 U/L / GGT: x           Microbiology: reviewed   Culture - Tissue with Gram Stain (collected 04-12-24 @ 22:47)  Source: .Tissue Left 5th Metatarsal  Gram Stain (04-15-24 @ 23:51):    Few polymorphonuclear leukocytes seen per low power field    No organisms seen per oil power field  Preliminary Report (04-15-24 @ 23:51):    Growth in fluid media only Gram positive cocci in pairs    Culture - Abscess with Gram Stain (collected 04-10-24 @ 12:30)  Source: .Abscess left foot abscess.  Gram Stain (04-12-24 @ 21:47):    No polymorphonuclear cells seen per low power field    No organisms seen per oil power field  Final Report (04-12-24 @ 21:47):    Rare Finegoldia magna    Culture - Acid Fast - Other w/Smear (collected 04-10-24 @ 12:30)  Source: .Other left foot abscess.  Preliminary Report (04-13-24 @ 15:06):    Culture is being performed.    Culture - Fungal, Other (collected 04-10-24 @ 12:30)  Source: .Other left foot abscess.  Preliminary Report (04-13-24 @ 23:03):    Culture is being performed. Fungal cultures are held for 4 weeks.    Culture - Acid Fast - Tissue w/Smear (collected 04-10-24 @ 12:22)  Source: .Tissue  Preliminary Report (04-13-24 @ 15:06):    Culture is being performed.    Culture - Fungal, Tissue (collected 04-10-24 @ 12:22)  Source: .Tissue  Preliminary Report (04-13-24 @ 23:03):    Culture is being performed. Fungal cultures are held for 4 weeks.    Culture - Tissue with Gram Stain (collected 04-10-24 @ 12:22)  Source: .Tissue left 5th proximal phalanx  Gram Stain (04-10-24 @ 23:36):    No polymorphonuclear cells seen per low power field    No organisms seen per oil power field  Final Report (04-15-24 @ 16:03):    No growth    Culture - Abscess with Gram Stain (collected 04-02-24 @ 17:40)  Source: .Abscess left foot  Gram Stain (04-03-24 @ 05:56):    No polymorphonuclear leukocytes seen per low power field    Numerous Gram positive cocci in pairs seen per oil power field  Final Report (04-04-24 @ 23:56):    Few Klebsiella oxytoca/Raoultella ornithinolytica    Few Streptococcus mitis/oralis group "Susceptibilities not performed"    Moderate Peptoniphilus harei group "Susceptibilities not performed"    Rare Staphylococcus pettenkoferi "Susceptibilities not performed"  Organism: Klebsiella oxytoca /Raoutella ornithinolytica (04-04-24 @ 23:56)  Organism: Klebsiella oxytoca /Raoutella ornithinolytica (04-04-24 @ 23:56)      Method Type: TARIQ      -  Amoxicillin/Clavulanic Acid: S <=8/4      -  Ampicillin: R >16 These ampicillin results predict results for amoxicillin      -  Ampicillin/Sulbactam: S 8/4      -  Aztreonam: S <=4      -  Cefazolin: R >16      -  Cefepime: S <=2      -  Cefoxitin: S <=8      -  Ceftriaxone: S <=1      -  Ciprofloxacin: S <=0.25      -  Ertapenem: S <=0.5      -  Gentamicin: S <=2      -  Imipenem: S <=1      -  Levofloxacin: S <=0.5      -  Meropenem: S <=1      -  Piperacillin/Tazobactam: S <=8      -  Tobramycin: S <=2      -  Trimethoprim/Sulfamethoxazole: S <=0.5/9.5    Culture - Blood (collected 04-02-24 @ 17:24)  Source: .Blood Blood-Peripheral  Final Report (04-07-24 @ 23:01):    No growth at 5 days    Culture - Blood (collected 04-02-24 @ 17:17)  Source: .Blood Blood-Peripheral  Final Report (04-07-24 @ 23:01):    No growth at 5 days          Radiology: reviewed     Medications:  acetaminophen     Tablet .. 650 milliGRAM(s) Oral every 6 hours PRN  aspirin  chewable 81 milliGRAM(s) Oral daily  chlorhexidine 4% Liquid 1 Application(s) Topical daily  clopidogrel Tablet 75 milliGRAM(s) Oral daily  dextrose 10% Bolus 125 milliLiter(s) IV Bolus once  dextrose 5%. 1000 milliLiter(s) IV Continuous <Continuous>  dextrose 5%. 1000 milliLiter(s) IV Continuous <Continuous>  dextrose 50% Injectable 25 Gram(s) IV Push once  dextrose 50% Injectable 12.5 Gram(s) IV Push once  dextrose Oral Gel 15 Gram(s) Oral once PRN  enoxaparin Injectable 40 milliGRAM(s) SubCutaneous every 24 hours  ertapenem  IVPB 1000 milliGRAM(s) IV Intermittent every 24 hours  glucagon  Injectable 1 milliGRAM(s) IntraMuscular once  HYDROmorphone  Injectable 0.5 milliGRAM(s) IV Push every 4 hours PRN  influenza   Vaccine 0.5 milliLiter(s) IntraMuscular once  insulin lispro (ADMELOG) corrective regimen sliding scale   SubCutaneous at bedtime  insulin lispro (ADMELOG) corrective regimen sliding scale   SubCutaneous three times a day before meals  oxycodone    5 mG/acetaminophen 325 mG 1 Tablet(s) Oral every 4 hours PRN    Current Antimicrobials:  ertapenem  IVPB 1000 milliGRAM(s) IV Intermittent every 24 hours    Prior/Completed Antimicrobials:  cefepime   IVPB  piperacillin/tazobactam IVPB.  piperacillin/tazobactam IVPB.-  vancomycin  IVPB.

## 2024-04-16 NOTE — PROGRESS NOTE ADULT - ASSESSMENT
Patient is a 59 year old male with PMH of stage IV colon cancer, s/p colectomy with ostomy 1/2024, peripheral neuropathy sent in from his podiatrist office with concern for osteomyelitis of the left foot.     L foot submet 5th wound with suspected early OM  Leukocytosis likely due to above and reactive postop  - s/p L foot wound explored by Podiatry - 1cm incision proximal to wound down to level of subq, mild serous drainage  - L foot Wcx - polymicrobial - Kleb oxytoca/Roultella ornithinolytica; Strep mitis/oralis grp, Peptoniphilus harei grp, Staph pettenkoferi  - MRI L foot suggestive of early OM at 5th metatarsal head and base of proximal phalanx, deep to soft tissue wound  - elevated ESR/CRP (99/140) on 4/2   - MRSA PCR screen negative, s/p vancomycin   - 4/8 s/p LLE angiogram, balloon angioplasty and stent to SFA  - 4/10 s/p L foot 5th met head resection, open    -- per brief op note - bone at proximal resection and biopsy was soft and bad quality, noted with necrotic wound 3.25x2.5cm with evidence of soft tissue infection and 3cc pus expressed -- high concern for residual infection   -- OR culture with Finegoldia magna   - 4/12 s/p I&D with skin graft application   -- per brief op note - no purulence; high concern for viability, low concern for residual bone or soft tissue infection    -- OR culture with GPC in pairs in [facility]  - afebrile, WBC normalized    4/14 s/p LUE PICC line   S/p zosyn 4/5-4/13    Recommendations:   Follow OR culture and surg path reports   Continue Ertapenem 1g IV Q24h (for ease of dosing) to complete 6 week course on 5/23/24  - Will need weekly labs CBC/CMP/ESR/CRP while on IV antibiotics - fax  results to [phone].  - Patient will follow up with us in ID office within 1-2 weeks of discharge -- office info provided   Continue local wound care     Stable from ID standpoint at this time.  Discharge planning per primary team.     Cherelle Horvath M.D.  [facility], Division of Infectious Diseases  [phone]  After 5pm on weekdays and all day on weekends - please call [phone]

## 2024-04-16 NOTE — PROGRESS NOTE ADULT - ASSESSMENT
Patient is a 59y Male active smoker with a hx of Stage IV rectal adenocarcinoma s/p colectomy with colostomy (1/2024) and T2DM c/b peripheral neuropathy who presented from PCP concerning for L foot infection, found to have L fifth metatarsal osteomyelitis. OR with podiatry pending vascular surgery. LLE angiogram with possible stent scheduled for 4/8. L foot 5th metatarsal head resection with graft application scheduled for 4/10.    PLANS:    # Osteomyelitis:   - In setting of diabetic foot wound. MRI demonstrating early osteomyelitis of L foot 5th metatarsal head and base of proximal phalanx.  - DAMIEN demonstrating LLE PAD  - BCx: NGTD  - Wound Cx: Klebsiella   - Podiatry following: sp OR for L foot 5th metatarsal head resection with graft application 4/10, S/p RTO 4/12 for left foot partial 5th ray resection with wound debridement and Kerecis graft application with Dr. Call  - Vascular surgery following: LLE angiogram balloon angioplasty and stent to SFA on 4/8  - ID following: C/w IV abx till 5/23/24  - PICC placed 4/14    # Rectal adenocarcinoma:  - Stage IV Rectal Adenocarcinoma with metastasis to liver diagnosed 08/2023. Follows with Dr. Renny Purvis.  - Oncology following  - Chemotherapy held in setting of infection  - Follow-up outpatient.  - Heme/ Onc following    # DM2:  - HgbA1c 6.8%  - Continue to monitor blood glucose levels  - Sliding Scale    # DVT ppx:  - Lovenox sq    DC planning    [facility]  [phone]

## 2024-04-16 NOTE — PROVIDER CONTACT NOTE (OTHER) - DATE AND TIME:
08-Apr-2024 13:19
08-Apr-2024 18:29
14-Apr-2024 07:26
16-Apr-2024 12:23
08-Apr-2024 07:59
13-Apr-2024 09:15
13-Apr-2024 22:45
08-Apr-2024 21:46
10-Apr-2024 18:42

## 2024-04-16 NOTE — DISCHARGE NOTE NURSING/CASE MANAGEMENT/SOCIAL WORK - NSDCPEFALRISK_GEN_ALL_CORE
For information on Fall & Injury Prevention, visit: [email or web address] OR  [email or web address] OR  https://www.cdc.gov/steadi/patient.html

## 2024-04-16 NOTE — PROGRESS NOTE ADULT - SUBJECTIVE AND OBJECTIVE BOX
Angus Ramirezwill| PGY-1  Internal Medicine    OVERNIGHT EVENTS: no overnight events      SUBJECTIVE: Patient was examined at bedside this morning. Appeared comfortable. Overnight vitals and monitoring results were reviewed. Reporting no complaints. Denied chest pain, SOB, abdominal pain, vomiting, diarrhea, constipation.       MEDICATIONS  (STANDING):  aspirin  chewable 81 milliGRAM(s) Oral daily  chlorhexidine 4% Liquid 1 Application(s) Topical daily  clopidogrel Tablet 75 milliGRAM(s) Oral daily  dextrose 10% Bolus 125 milliLiter(s) IV Bolus once  dextrose 5%. 1000 milliLiter(s) (100 mL/Hr) IV Continuous <Continuous>  dextrose 5%. 1000 milliLiter(s) (50 mL/Hr) IV Continuous <Continuous>  dextrose 50% Injectable 12.5 Gram(s) IV Push once  dextrose 50% Injectable 25 Gram(s) IV Push once  enoxaparin Injectable 40 milliGRAM(s) SubCutaneous every 24 hours  ertapenem  IVPB 1000 milliGRAM(s) IV Intermittent every 24 hours  glucagon  Injectable 1 milliGRAM(s) IntraMuscular once  influenza   Vaccine 0.5 milliLiter(s) IntraMuscular once  insulin lispro (ADMELOG) corrective regimen sliding scale   SubCutaneous at bedtime  insulin lispro (ADMELOG) corrective regimen sliding scale   SubCutaneous three times a day before meals    MEDICATIONS  (PRN):  acetaminophen     Tablet .. 650 milliGRAM(s) Oral every 6 hours PRN Mild Pain (1 - 3)  dextrose Oral Gel 15 Gram(s) Oral once PRN Blood Glucose LESS THAN 70 milliGRAM(s)/deciliter  HYDROmorphone  Injectable 0.5 milliGRAM(s) IV Push every 4 hours PRN Severe Pain (7 - 10)  oxycodone    5 mG/acetaminophen 325 mG 1 Tablet(s) Oral every 4 hours PRN Moderate Pain (4 - 6)        T(F): 97.6 (04-16-24 @ 11:33), Max: 98.1 (04-15-24 @ 21:07)  HR: 67 (04-16-24 @ 11:33) (67 - 69)  BP: 126/72 (04-16-24 @ 11:33) (118/77 - 126/72)  BP(mean): --  RR: 18 (04-16-24 @ 11:33) (18 - 18)  SpO2: 97% (04-16-24 @ 11:33) (97% - 98%)    PHYSICAL EXAM:     GENERAL: NAD, lying in bed comfortably  HEAD:  Atraumatic, Normocephalic  EYES: EOMI, PERRLA, conjunctiva and sclera clear, no nystagmus noted  ENT: Moist mucous membranes,   NECK: Supple, No JVD, trachea midline  CHEST/LUNG: Clear to auscultation bilaterally; No rales, rhonchi, wheezing, or rubs. Unlabored respirations  HEART: Regular rate and rhythm; No murmurs, rubs, or gallops, normal S1/S2  ABDOMEN: normal bowel sounds; Soft, nontender, nondistended, no organomegaly   EXTREMITIES:  2+ Peripheral Pulses, brisk capillary refill. No clubbing, cyanosis, or edema  MSK: No gross deformities noted   Neurological:  A&Ox3, no focal deficits   SKIN: No rashes or lesions  PSYCH: Normal mood, affect     TELEMETRY:    LABS:                        10.2   8.52  )-----------( 525      ( 15 Apr 2024 07:08 )             30.2     04-15    137  |  102  |  28<H>  ----------------------------<  176<H>  4.9   |  24  |  1.22    Ca    9.9      15 Apr 2024 07:08  Phos  3.9     04-15  Mg     1.9     04-15              Creatinine Trend: 1.22<--, 1.06<--, 1.36<--, 1.11<--, 1.14<--, 1.09<--  I&O's Summary    15 Apr 2024 07:01  -  16 Apr 2024 07:00  --------------------------------------------------------  IN: 240 mL / OUT: 1325 mL / NET: -1085 mL      BNP    RADIOLOGY & ADDITIONAL STUDIES:

## 2024-04-16 NOTE — DIETITIAN INITIAL EVALUATION ADULT - ENERGY INTAKE
Currently in-house, pt reported good appetite and PO intake. Pt requesting more portions, RD to allow for double protein at meals to optimize protein-energy intake and assist with wound healing. Pt declined oral nutrition supplements at this time. RD offered to provide pt with menu to assist with food preferences, pt declined at this time.  Adequate (%)

## 2024-04-17 LAB
-  CLINDAMYCIN: SIGNIFICANT CHANGE UP
-  ERYTHROMYCIN: SIGNIFICANT CHANGE UP
-  GENTAMICIN: SIGNIFICANT CHANGE UP
-  OXACILLIN: SIGNIFICANT CHANGE UP
-  PENICILLIN: SIGNIFICANT CHANGE UP
-  RIFAMPIN: SIGNIFICANT CHANGE UP
-  TETRACYCLINE: SIGNIFICANT CHANGE UP
-  TRIMETHOPRIM/SULFAMETHOXAZOLE: SIGNIFICANT CHANGE UP
-  VANCOMYCIN: SIGNIFICANT CHANGE UP
CULTURE RESULTS: ABNORMAL
METHOD TYPE: SIGNIFICANT CHANGE UP
ORGANISM # SPEC MICROSCOPIC CNT: ABNORMAL
ORGANISM # SPEC MICROSCOPIC CNT: ABNORMAL
SPECIMEN SOURCE: SIGNIFICANT CHANGE UP

## 2024-05-08 LAB
CULTURE RESULTS: SIGNIFICANT CHANGE UP
CULTURE RESULTS: SIGNIFICANT CHANGE UP
SPECIMEN SOURCE: SIGNIFICANT CHANGE UP
SPECIMEN SOURCE: SIGNIFICANT CHANGE UP

## 2024-05-17 NOTE — PRE-ANESTHESIA EVALUATION ADULT - HEIGHT IN INCHES
May 17, 2024     Patient: Facundo Thomas   YOB: 1997   Date of Visit: 5/17/2024       To Whom It May Concern:    Facundo Thomas was seen virtually in my clinic on 5/17/2024 at ?. Please excuse Facundo for his absence from work on 05/15/2024    If you have any questions or concerns, please don't hesitate to call.         Sincerely,         Дмитрий Bill, DO          
1

## 2024-05-25 LAB
CULTURE RESULTS: SIGNIFICANT CHANGE UP
CULTURE RESULTS: SIGNIFICANT CHANGE UP
SPECIMEN SOURCE: SIGNIFICANT CHANGE UP
SPECIMEN SOURCE: SIGNIFICANT CHANGE UP

## (undated) DEVICE — FOLEY TRAY 16FR 5CC LTX UMETER CLOSED

## (undated) DEVICE — MEDICATION LABELS W MARKER

## (undated) DEVICE — SPECIMEN CONTAINER 100ML

## (undated) DEVICE — DRSG COMBINE 5X9"

## (undated) DEVICE — BLADE SCALPEL SAFETYLOCK #15

## (undated) DEVICE — WARMING BLANKET UPPER ADULT

## (undated) DEVICE — SUT PLAIN GUT 4-0 18" P-12

## (undated) DEVICE — DRAPE MAGNETIC INSTRUMENT MEDIUM

## (undated) DEVICE — DRAPE LIGHT HANDLE COVER (BLUE)

## (undated) DEVICE — DRSG STOCKINETTE IMPERVIOUS XL

## (undated) DEVICE — SYR LUER LOK 10CC

## (undated) DEVICE — SUT POLYSORB 2-0 30" GS-21 UNDYED

## (undated) DEVICE — NDL HYPO REGULAR BEVEL 25G X 1.5" (BLUE)

## (undated) DEVICE — SOL IRR POUR H2O 250ML

## (undated) DEVICE — DRAPE 3/4 SHEET W REINFORCEMENT 56X77"

## (undated) DEVICE — LAP PAD 18 X 18"

## (undated) DEVICE — SAW BLADE MICROAIRE OSCILATING 25.4MM X 90MM X 1.27MM

## (undated) DEVICE — VENODYNE/SCD SLEEVE CALF MEDIUM

## (undated) DEVICE — PACKING GAUZE IODOFORM 2"

## (undated) DEVICE — DRSG KLING 4"

## (undated) DEVICE — DRAPE INSTRUMENT POUCH 6.75" X 11"

## (undated) DEVICE — STRYKER INTERPULSE HANDPIECE W IRR SUCTION TUBE

## (undated) DEVICE — DRAPE ISOLATION BAG 20X20"

## (undated) DEVICE — STAPLER SKIN VISI-STAT 35 WIDE

## (undated) DEVICE — PACKING GAUZE PLAIN 2"

## (undated) DEVICE — GLV 8 PROTEXIS (WHITE)

## (undated) DEVICE — SAW BLADE MICROAIRE SAGITTAL 5.8X25.4X0.6 MM

## (undated) DEVICE — SOL IRR POUR NS 0.9% 500ML

## (undated) DEVICE — PACKING GAUZE PLAIN 0.5"

## (undated) DEVICE — DRAPE 1/2 SHEET 40X57"

## (undated) DEVICE — DRAPE TOWEL BLUE 17" X 24"

## (undated) DEVICE — SAW BLADE MICROAIRE SAGITTAL 9.4MMX25.4MMX0.6MM

## (undated) DEVICE — DRSG STOCKINETTE IMPERVIOUS MED

## (undated) DEVICE — MARKING PEN W RULER

## (undated) DEVICE — BUR STRYKER OVAL SOLID CARBIDE MED 4MM

## (undated) DEVICE — DRSG ADAPTIC CURITY OIL EMULSION 3 X 8"

## (undated) DEVICE — PREP BETADINE KIT

## (undated) DEVICE — PACK EXTREMITY

## (undated) DEVICE — DRSG XEROFORM 1 X 8"

## (undated) DEVICE — POSITIONER FOAM EGG CRATE ULNAR 2PCS (PINK)

## (undated) DEVICE — DRSG ACE BANDAGE 6"

## (undated) DEVICE — DRSG STERISTRIPS 0.5 X 4"